# Patient Record
Sex: FEMALE | Race: WHITE | NOT HISPANIC OR LATINO | Employment: OTHER | URBAN - NONMETROPOLITAN AREA
[De-identification: names, ages, dates, MRNs, and addresses within clinical notes are randomized per-mention and may not be internally consistent; named-entity substitution may affect disease eponyms.]

---

## 2017-03-10 PROCEDURE — 88304 TISSUE EXAM BY PATHOLOGIST: CPT | Performed by: SURGERY

## 2017-03-14 ENCOUNTER — LAB REQUISITION (OUTPATIENT)
Dept: LAB | Facility: HOSPITAL | Age: 62
End: 2017-03-14

## 2017-03-14 DIAGNOSIS — Z00.00 ENCOUNTER FOR GENERAL ADULT MEDICAL EXAMINATION WITHOUT ABNORMAL FINDINGS: ICD-10-CM

## 2017-03-15 LAB
CYTO UR: NORMAL
LAB AP CASE REPORT: NORMAL
LAB AP CLINICAL INFORMATION: NORMAL
Lab: NORMAL
PATH REPORT.FINAL DX SPEC: NORMAL
PATH REPORT.GROSS SPEC: NORMAL

## 2020-06-23 PROCEDURE — 88305 TISSUE EXAM BY PATHOLOGIST: CPT | Performed by: GENERAL PRACTICE

## 2020-06-24 ENCOUNTER — LAB REQUISITION (OUTPATIENT)
Dept: LAB | Facility: HOSPITAL | Age: 65
End: 2020-06-24

## 2020-06-24 DIAGNOSIS — Z00.00 ENCOUNTER FOR GENERAL ADULT MEDICAL EXAMINATION WITHOUT ABNORMAL FINDINGS: ICD-10-CM

## 2020-06-25 LAB
CYTO UR: NORMAL
LAB AP CASE REPORT: NORMAL
LAB AP CLINICAL INFORMATION: NORMAL
PATH REPORT.FINAL DX SPEC: NORMAL
PATH REPORT.GROSS SPEC: NORMAL

## 2020-08-11 ENCOUNTER — LAB REQUISITION (OUTPATIENT)
Dept: LAB | Facility: HOSPITAL | Age: 65
End: 2020-08-11

## 2020-08-11 DIAGNOSIS — Z00.00 ENCOUNTER FOR GENERAL ADULT MEDICAL EXAMINATION WITHOUT ABNORMAL FINDINGS: ICD-10-CM

## 2020-08-11 PROCEDURE — 88305 TISSUE EXAM BY PATHOLOGIST: CPT | Performed by: GENERAL PRACTICE

## 2020-08-11 PROCEDURE — 88312 SPECIAL STAINS GROUP 1: CPT | Performed by: GENERAL PRACTICE

## 2020-08-13 LAB
LAB AP CASE REPORT: NORMAL
LAB AP CLINICAL INFORMATION: NORMAL
LAB AP DIAGNOSIS COMMENT: NORMAL
PATH REPORT.FINAL DX SPEC: NORMAL
PATH REPORT.GROSS SPEC: NORMAL

## 2021-04-15 PROCEDURE — 88305 TISSUE EXAM BY PATHOLOGIST: CPT | Performed by: GENERAL PRACTICE

## 2021-04-15 PROCEDURE — 88313 SPECIAL STAINS GROUP 2: CPT | Performed by: GENERAL PRACTICE

## 2021-04-18 ENCOUNTER — LAB REQUISITION (OUTPATIENT)
Dept: LAB | Facility: HOSPITAL | Age: 66
End: 2021-04-18

## 2021-04-18 DIAGNOSIS — Z00.00 ENCOUNTER FOR GENERAL ADULT MEDICAL EXAMINATION WITHOUT ABNORMAL FINDINGS: ICD-10-CM

## 2021-04-21 LAB
CYTO UR: NORMAL
LAB AP CASE REPORT: NORMAL
LAB AP CLINICAL INFORMATION: NORMAL
LAB AP DIAGNOSIS COMMENT: NORMAL
PATH REPORT.FINAL DX SPEC: NORMAL
PATH REPORT.GROSS SPEC: NORMAL

## 2021-06-08 ENCOUNTER — HOSPITAL ENCOUNTER (OUTPATIENT)
Dept: PREADMISSION TESTING | Age: 66
Discharge: HOME | End: 2021-06-12
Payer: MEDICARE

## 2021-06-08 VITALS — WEIGHT: 186 LBS

## 2021-06-08 LAB
ANION GAP SERPL CALCULATED.3IONS-SCNC: 11 MMOL/L (ref 7–19)
BASOPHILS ABSOLUTE: 0.1 K/UL (ref 0–0.2)
BASOPHILS RELATIVE PERCENT: 0.8 % (ref 0–1)
BUN BLDV-MCNC: 21 MG/DL (ref 8–23)
CALCIUM SERPL-MCNC: 9.4 MG/DL (ref 8.8–10.2)
CHLORIDE BLD-SCNC: 104 MMOL/L (ref 98–111)
CO2: 27 MMOL/L (ref 22–29)
CREAT SERPL-MCNC: 1.1 MG/DL (ref 0.5–0.9)
EKG P AXIS: 31 DEGREES
EKG P-R INTERVAL: 174 MS
EKG Q-T INTERVAL: 430 MS
EKG QRS DURATION: 90 MS
EKG QTC CALCULATION (BAZETT): 465 MS
EKG T AXIS: 66 DEGREES
EOSINOPHILS ABSOLUTE: 0.1 K/UL (ref 0–0.6)
EOSINOPHILS RELATIVE PERCENT: 1.5 % (ref 0–5)
GFR AFRICAN AMERICAN: >59
GFR NON-AFRICAN AMERICAN: 50
GLUCOSE BLD-MCNC: 122 MG/DL (ref 74–109)
HCT VFR BLD CALC: 36.9 % (ref 37–47)
HEMOGLOBIN: 12.1 G/DL (ref 12–16)
IMMATURE GRANULOCYTES #: 0 K/UL
LYMPHOCYTES ABSOLUTE: 2.5 K/UL (ref 1.1–4.5)
LYMPHOCYTES RELATIVE PERCENT: 30 % (ref 20–40)
MCH RBC QN AUTO: 31.2 PG (ref 27–31)
MCHC RBC AUTO-ENTMCNC: 32.8 G/DL (ref 33–37)
MCV RBC AUTO: 95.1 FL (ref 81–99)
MONOCYTES ABSOLUTE: 0.3 K/UL (ref 0–0.9)
MONOCYTES RELATIVE PERCENT: 3.9 % (ref 0–10)
NEUTROPHILS ABSOLUTE: 5.2 K/UL (ref 1.5–7.5)
NEUTROPHILS RELATIVE PERCENT: 63.4 % (ref 50–65)
PDW BLD-RTO: 12.8 % (ref 11.5–14.5)
PLATELET # BLD: 298 K/UL (ref 130–400)
PMV BLD AUTO: 9.9 FL (ref 9.4–12.3)
POTASSIUM SERPL-SCNC: 4.2 MMOL/L (ref 3.5–5)
RBC # BLD: 3.88 M/UL (ref 4.2–5.4)
SARS-COV-2, PCR: NOT DETECTED
SODIUM BLD-SCNC: 142 MMOL/L (ref 136–145)
WBC # BLD: 8.3 K/UL (ref 4.8–10.8)

## 2021-06-08 PROCEDURE — 93005 ELECTROCARDIOGRAM TRACING: CPT | Performed by: ORTHOPAEDIC SURGERY

## 2021-06-08 PROCEDURE — U0003 INFECTIOUS AGENT DETECTION BY NUCLEIC ACID (DNA OR RNA); SEVERE ACUTE RESPIRATORY SYNDROME CORONAVIRUS 2 (SARS-COV-2) (CORONAVIRUS DISEASE [COVID-19]), AMPLIFIED PROBE TECHNIQUE, MAKING USE OF HIGH THROUGHPUT TECHNOLOGIES AS DESCRIBED BY CMS-2020-01-R: HCPCS

## 2021-06-08 PROCEDURE — 80048 BASIC METABOLIC PNL TOTAL CA: CPT

## 2021-06-08 PROCEDURE — 85025 COMPLETE CBC W/AUTO DIFF WBC: CPT

## 2021-06-08 PROCEDURE — U0005 INFEC AGEN DETEC AMPLI PROBE: HCPCS

## 2021-06-08 PROCEDURE — 93010 ELECTROCARDIOGRAM REPORT: CPT | Performed by: INTERNAL MEDICINE

## 2021-06-08 RX ORDER — LEVOTHYROXINE SODIUM 0.03 MG/1
25 TABLET ORAL DAILY
COMMUNITY

## 2021-06-08 RX ORDER — SUCRALFATE 1 G/1
1 TABLET ORAL 4 TIMES DAILY
COMMUNITY
End: 2022-01-20

## 2021-06-08 RX ORDER — PANTOPRAZOLE SODIUM 40 MG/1
40 TABLET, DELAYED RELEASE ORAL 2 TIMES DAILY
COMMUNITY
End: 2022-01-20

## 2021-06-08 RX ORDER — LISINOPRIL 20 MG/1
20 TABLET ORAL DAILY
COMMUNITY
End: 2022-01-20

## 2021-06-08 RX ORDER — ASPIRIN 81 MG/1
81 TABLET ORAL DAILY
COMMUNITY
End: 2022-01-20

## 2021-06-09 PROBLEM — S83.242D: Status: ACTIVE | Noted: 2021-06-09

## 2021-06-09 NOTE — PROGRESS NOTES
Cardiac read EKG don in PAT on 6/8/21 reviewed by anesthesia. OK to proceed with surgery per Dr. Jane Diego.

## 2021-06-09 NOTE — OP NOTE
Patient Name: Thomasina Angelucci: 1955  MRN: Flores Galvan of SURGERY: 6/10/2021    SURGEON: [address] [place] [address] Dion Rodriguez MD    ASSISTANT: NONE    PREOPERATIVE DIAGNOSIS:   1) Acute traumatic complex tear of the posterior horn of the medial meniscus, Left knee  2) Chondromalacia patella, left knee    POSTOPERATIVE DIAGNOSIS:   1) Acute traumatic complex tear of the posterior horn of the medial meniscus, Left knee  2) Acute traumatic complex tear of the anterior horn of the lateral meniscus, Left knee  3) Chondromalacia patella, left knee    PROCEDURE PERFORMED:  1)  Left knee arthroscopic partial medial AND lateral meniscectomies  2)  Left knee arthroscopic patellar chondroplasty    IMPLANTS: none    ANESTHESIA USED: LMA    OPERATIVE INDICATIONS: This patient is a 60 y.o. female presented to my clinic with complaints of progressive knee pain and mechanical symptoms after a traumatic injury to the knee that occurred on 5/2/21 after falling over some loose boards on a porch while visiting relatives in [place]. An MRI was obtained which showed the above named diagnoses. Pain and mechanical symptoms were not relieved with conservative treatment consisting of anti-inflammatories, corticosteroid injections, physical therapy. Due to progressive pain and loss of function, surgical evaluation was discussed and the patient wished to proceed understanding risks, benefits, and alternatives. Surgical indications were to relieve pain and mechanical symptoms related to an unstable meniscus tear. Risks included, but were not limited to, that of anesthesia, bleeding, infection, pain, damage to local structures, need for further surgery, failure to improve, stiffness, failure of implants, and loss of function.       ESTIMATED BLOOD LOSS: minimal    DRAINS: none     COMPLICATIONS: none    SPECIMENS: none    OPERATIVE FINDINGS:  1) Exam Under Anesthesia:  ROM 0-130, stable ligamentously without stable. Attention was then turned to the patellofemoral compartment where a probe was used to demonstrate unstable chondral flaps. A shaver and cautery device were used to debride the unstable portions of the cartilage. The probe was reinserted verifying the remaining cartilage to be stable without further injury. Free fluid was suctioned from the knee, portals were closed with monocryl suture and steri-strips. A sterile dressing was placed. The patient was awakened from anesthesia, transported to the PACU in stable condition.     POSTOPERATIVE PLAN:  1) Discharge home with family  2) Return to clinic 1 week for a clinical check     Electronically signed by Garland Buckley MD on 6/10/2021 at 10:57 AM

## 2021-06-10 ENCOUNTER — ANESTHESIA EVENT (OUTPATIENT)
Dept: OPERATING ROOM | Age: 66
End: 2021-06-10
Payer: MEDICARE

## 2021-06-10 ENCOUNTER — ANESTHESIA (OUTPATIENT)
Dept: OPERATING ROOM | Age: 66
End: 2021-06-10
Payer: MEDICARE

## 2021-06-10 ENCOUNTER — HOSPITAL ENCOUNTER (OUTPATIENT)
Age: 66
Setting detail: OUTPATIENT SURGERY
Discharge: HOME | End: 2021-06-10
Attending: ORTHOPAEDIC SURGERY | Admitting: ORTHOPAEDIC SURGERY
Payer: MEDICARE

## 2021-06-10 VITALS
RESPIRATION RATE: 16 BRPM | HEIGHT: 62 IN | TEMPERATURE: 97.1 F | DIASTOLIC BLOOD PRESSURE: 79 MMHG | WEIGHT: 186 LBS | OXYGEN SATURATION: 95 % | SYSTOLIC BLOOD PRESSURE: 143 MMHG | HEART RATE: 78 BPM | BODY MASS INDEX: 34.23 KG/M2

## 2021-06-10 VITALS — OXYGEN SATURATION: 98 % | SYSTOLIC BLOOD PRESSURE: 105 MMHG | DIASTOLIC BLOOD PRESSURE: 64 MMHG | TEMPERATURE: 95.7 F

## 2021-06-10 DIAGNOSIS — S83.242D TRAUMATIC TEAR OF MEDIAL MENISCUS OF KNEE, LEFT, SUBSEQUENT ENCOUNTER: Primary | ICD-10-CM

## 2021-06-10 PROCEDURE — 7100000011 HC PHASE II RECOVERY - ADDTL 15 MIN: Performed by: ORTHOPAEDIC SURGERY

## 2021-06-10 PROCEDURE — 7100000010 HC PHASE II RECOVERY - FIRST 15 MIN: Performed by: ORTHOPAEDIC SURGERY

## 2021-06-10 PROCEDURE — 2500000003 HC RX 250 WO HCPCS: Performed by: ORTHOPAEDIC SURGERY

## 2021-06-10 PROCEDURE — 2709999900 HC NON-CHARGEABLE SUPPLY: Performed by: ORTHOPAEDIC SURGERY

## 2021-06-10 PROCEDURE — 3700000000 HC ANESTHESIA ATTENDED CARE: Performed by: ORTHOPAEDIC SURGERY

## 2021-06-10 PROCEDURE — 2580000003 HC RX 258: Performed by: NURSE ANESTHETIST, CERTIFIED REGISTERED

## 2021-06-10 PROCEDURE — 6370000000 HC RX 637 (ALT 250 FOR IP): Performed by: ORTHOPAEDIC SURGERY

## 2021-06-10 PROCEDURE — 7100000001 HC PACU RECOVERY - ADDTL 15 MIN: Performed by: ORTHOPAEDIC SURGERY

## 2021-06-10 PROCEDURE — 6360000002 HC RX W HCPCS: Performed by: NURSE ANESTHETIST, CERTIFIED REGISTERED

## 2021-06-10 PROCEDURE — 3600000004 HC SURGERY LEVEL 4 BASE: Performed by: ORTHOPAEDIC SURGERY

## 2021-06-10 PROCEDURE — 2500000003 HC RX 250 WO HCPCS: Performed by: NURSE ANESTHETIST, CERTIFIED REGISTERED

## 2021-06-10 PROCEDURE — 7100000000 HC PACU RECOVERY - FIRST 15 MIN: Performed by: ORTHOPAEDIC SURGERY

## 2021-06-10 PROCEDURE — 2720000010 HC SURG SUPPLY STERILE: Performed by: ORTHOPAEDIC SURGERY

## 2021-06-10 PROCEDURE — 3600000014 HC SURGERY LEVEL 4 ADDTL 15MIN: Performed by: ORTHOPAEDIC SURGERY

## 2021-06-10 PROCEDURE — 6360000002 HC RX W HCPCS: Performed by: ORTHOPAEDIC SURGERY

## 2021-06-10 PROCEDURE — 3700000001 HC ADD 15 MINUTES (ANESTHESIA): Performed by: ORTHOPAEDIC SURGERY

## 2021-06-10 RX ORDER — LABETALOL HYDROCHLORIDE 5 MG/ML
5 INJECTION, SOLUTION INTRAVENOUS EVERY 10 MIN PRN
Status: DISCONTINUED | OUTPATIENT
Start: 2021-06-10 | End: 2021-06-10 | Stop reason: HOSPADM

## 2021-06-10 RX ORDER — HYDROCODONE BITARTRATE AND ACETAMINOPHEN 5; 325 MG/1; MG/1
1 TABLET ORAL EVERY 4 HOURS PRN
Qty: 15 TABLET | Refills: 0 | Status: SHIPPED | OUTPATIENT
Start: 2021-06-10 | End: 2021-06-17

## 2021-06-10 RX ORDER — PROPOFOL 10 MG/ML
INJECTION, EMULSION INTRAVENOUS PRN
Status: DISCONTINUED | OUTPATIENT
Start: 2021-06-10 | End: 2021-06-10 | Stop reason: SDUPTHER

## 2021-06-10 RX ORDER — FENTANYL CITRATE 50 UG/ML
INJECTION, SOLUTION INTRAMUSCULAR; INTRAVENOUS PRN
Status: DISCONTINUED | OUTPATIENT
Start: 2021-06-10 | End: 2021-06-10 | Stop reason: SDUPTHER

## 2021-06-10 RX ORDER — METOCLOPRAMIDE HYDROCHLORIDE 5 MG/ML
10 INJECTION INTRAMUSCULAR; INTRAVENOUS
Status: DISCONTINUED | OUTPATIENT
Start: 2021-06-10 | End: 2021-06-10 | Stop reason: HOSPADM

## 2021-06-10 RX ORDER — NAPROXEN 500 MG/1
500 TABLET ORAL DAILY
COMMUNITY
End: 2022-01-20

## 2021-06-10 RX ORDER — DIPHENHYDRAMINE HYDROCHLORIDE 50 MG/ML
12.5 INJECTION INTRAMUSCULAR; INTRAVENOUS
Status: DISCONTINUED | OUTPATIENT
Start: 2021-06-10 | End: 2021-06-10 | Stop reason: HOSPADM

## 2021-06-10 RX ORDER — HYDROMORPHONE HYDROCHLORIDE 1 MG/ML
0.5 INJECTION, SOLUTION INTRAMUSCULAR; INTRAVENOUS; SUBCUTANEOUS EVERY 5 MIN PRN
Status: DISCONTINUED | OUTPATIENT
Start: 2021-06-10 | End: 2021-06-10 | Stop reason: HOSPADM

## 2021-06-10 RX ORDER — ENALAPRILAT 2.5 MG/2ML
1.25 INJECTION INTRAVENOUS
Status: DISCONTINUED | OUTPATIENT
Start: 2021-06-10 | End: 2021-06-10 | Stop reason: HOSPADM

## 2021-06-10 RX ORDER — ONDANSETRON 2 MG/ML
INJECTION INTRAMUSCULAR; INTRAVENOUS PRN
Status: DISCONTINUED | OUTPATIENT
Start: 2021-06-10 | End: 2021-06-10 | Stop reason: SDUPTHER

## 2021-06-10 RX ORDER — ONDANSETRON 4 MG/1
4 TABLET, FILM COATED ORAL EVERY 8 HOURS PRN
Qty: 10 TABLET | Refills: 0 | Status: SHIPPED | OUTPATIENT
Start: 2021-06-10 | End: 2022-01-20

## 2021-06-10 RX ORDER — MORPHINE SULFATE 4 MG/ML
2 INJECTION, SOLUTION INTRAMUSCULAR; INTRAVENOUS EVERY 5 MIN PRN
Status: DISCONTINUED | OUTPATIENT
Start: 2021-06-10 | End: 2021-06-10 | Stop reason: HOSPADM

## 2021-06-10 RX ORDER — OXYCODONE HYDROCHLORIDE AND ACETAMINOPHEN 5; 325 MG/1; MG/1
2 TABLET ORAL PRN
Status: COMPLETED | OUTPATIENT
Start: 2021-06-10 | End: 2021-06-10

## 2021-06-10 RX ORDER — MORPHINE SULFATE 4 MG/ML
4 INJECTION, SOLUTION INTRAMUSCULAR; INTRAVENOUS EVERY 5 MIN PRN
Status: DISCONTINUED | OUTPATIENT
Start: 2021-06-10 | End: 2021-06-10 | Stop reason: HOSPADM

## 2021-06-10 RX ORDER — DEXAMETHASONE SODIUM PHOSPHATE 10 MG/ML
INJECTION, SOLUTION INTRAMUSCULAR; INTRAVENOUS PRN
Status: DISCONTINUED | OUTPATIENT
Start: 2021-06-10 | End: 2021-06-10 | Stop reason: SDUPTHER

## 2021-06-10 RX ORDER — OXYCODONE HYDROCHLORIDE AND ACETAMINOPHEN 5; 325 MG/1; MG/1
1 TABLET ORAL PRN
Status: COMPLETED | OUTPATIENT
Start: 2021-06-10 | End: 2021-06-10

## 2021-06-10 RX ORDER — SODIUM CHLORIDE, SODIUM LACTATE, POTASSIUM CHLORIDE, CALCIUM CHLORIDE 600; 310; 30; 20 MG/100ML; MG/100ML; MG/100ML; MG/100ML
INJECTION, SOLUTION INTRAVENOUS CONTINUOUS PRN
Status: DISCONTINUED | OUTPATIENT
Start: 2021-06-10 | End: 2021-06-10 | Stop reason: SDUPTHER

## 2021-06-10 RX ORDER — SODIUM CHLORIDE, SODIUM LACTATE, POTASSIUM CHLORIDE, CALCIUM CHLORIDE 600; 310; 30; 20 MG/100ML; MG/100ML; MG/100ML; MG/100ML
INJECTION, SOLUTION INTRAVENOUS CONTINUOUS
Status: DISCONTINUED | OUTPATIENT
Start: 2021-06-10 | End: 2021-06-10 | Stop reason: HOSPADM

## 2021-06-10 RX ORDER — LIDOCAINE HYDROCHLORIDE 10 MG/ML
INJECTION, SOLUTION INFILTRATION; PERINEURAL PRN
Status: DISCONTINUED | OUTPATIENT
Start: 2021-06-10 | End: 2021-06-10 | Stop reason: SDUPTHER

## 2021-06-10 RX ORDER — HYDROMORPHONE HYDROCHLORIDE 1 MG/ML
0.25 INJECTION, SOLUTION INTRAMUSCULAR; INTRAVENOUS; SUBCUTANEOUS EVERY 5 MIN PRN
Status: DISCONTINUED | OUTPATIENT
Start: 2021-06-10 | End: 2021-06-10 | Stop reason: HOSPADM

## 2021-06-10 RX ORDER — MEPERIDINE HYDROCHLORIDE 50 MG/ML
12.5 INJECTION INTRAMUSCULAR; INTRAVENOUS; SUBCUTANEOUS EVERY 5 MIN PRN
Status: DISCONTINUED | OUTPATIENT
Start: 2021-06-10 | End: 2021-06-10 | Stop reason: HOSPADM

## 2021-06-10 RX ORDER — PROMETHAZINE HYDROCHLORIDE 25 MG/ML
6.25 INJECTION, SOLUTION INTRAMUSCULAR; INTRAVENOUS
Status: DISCONTINUED | OUTPATIENT
Start: 2021-06-10 | End: 2021-06-10 | Stop reason: HOSPADM

## 2021-06-10 RX ORDER — HYDRALAZINE HYDROCHLORIDE 20 MG/ML
5 INJECTION INTRAMUSCULAR; INTRAVENOUS EVERY 10 MIN PRN
Status: DISCONTINUED | OUTPATIENT
Start: 2021-06-10 | End: 2021-06-10 | Stop reason: HOSPADM

## 2021-06-10 RX ADMIN — LIDOCAINE HYDROCHLORIDE 50 MG: 10 INJECTION, SOLUTION INFILTRATION; PERINEURAL at 10:16

## 2021-06-10 RX ADMIN — ONDANSETRON HYDROCHLORIDE 4 MG: 2 INJECTION, SOLUTION INTRAMUSCULAR; INTRAVENOUS at 10:45

## 2021-06-10 RX ADMIN — DEXAMETHASONE SODIUM PHOSPHATE 10 MG: 10 INJECTION, SOLUTION INTRAMUSCULAR; INTRAVENOUS at 10:20

## 2021-06-10 RX ADMIN — PROPOFOL 150 MG: 10 INJECTION, EMULSION INTRAVENOUS at 10:16

## 2021-06-10 RX ADMIN — FENTANYL CITRATE 50 MCG: 50 INJECTION, SOLUTION INTRAMUSCULAR; INTRAVENOUS at 10:35

## 2021-06-10 RX ADMIN — HYDROMORPHONE HYDROCHLORIDE 0.5 MG: 1 INJECTION, SOLUTION INTRAMUSCULAR; INTRAVENOUS; SUBCUTANEOUS at 11:19

## 2021-06-10 RX ADMIN — FENTANYL CITRATE 50 MCG: 50 INJECTION, SOLUTION INTRAMUSCULAR; INTRAVENOUS at 10:16

## 2021-06-10 RX ADMIN — SODIUM CHLORIDE, POTASSIUM CHLORIDE, SODIUM LACTATE AND CALCIUM CHLORIDE: 600; 310; 30; 20 INJECTION, SOLUTION INTRAVENOUS at 10:12

## 2021-06-10 RX ADMIN — OXYCODONE HYDROCHLORIDE AND ACETAMINOPHEN 2 TABLET: 5; 325 TABLET ORAL at 12:12

## 2021-06-10 RX ADMIN — HYDROMORPHONE HYDROCHLORIDE 0.5 MG: 1 INJECTION, SOLUTION INTRAMUSCULAR; INTRAVENOUS; SUBCUTANEOUS at 11:33

## 2021-06-10 RX ADMIN — Medication 2000 MG: at 10:23

## 2021-06-10 RX ADMIN — HYDROMORPHONE HYDROCHLORIDE 0.5 MG: 1 INJECTION, SOLUTION INTRAMUSCULAR; INTRAVENOUS; SUBCUTANEOUS at 11:04

## 2021-06-10 ASSESSMENT — PAIN SCALES - GENERAL
PAINLEVEL_OUTOF10: 7
PAINLEVEL_OUTOF10: 3
PAINLEVEL_OUTOF10: 5

## 2021-06-10 ASSESSMENT — PAIN DESCRIPTION - PAIN TYPE
TYPE: SURGICAL PAIN

## 2021-06-10 ASSESSMENT — PAIN DESCRIPTION - DESCRIPTORS
DESCRIPTORS: ACHING

## 2021-06-10 ASSESSMENT — PAIN DESCRIPTION - ORIENTATION
ORIENTATION: LEFT
ORIENTATION: LEFT

## 2021-06-10 ASSESSMENT — LIFESTYLE VARIABLES: SMOKING_STATUS: 0

## 2021-06-10 ASSESSMENT — PAIN DESCRIPTION - LOCATION
LOCATION: INCISION;KNEE
LOCATION: KNEE

## 2021-06-10 NOTE — BRIEF OP NOTE
Brief Postoperative Note      Patient: Mabel Conn  YOB: 1955  MRN: [id number]    Date of Procedure: 6/10/2021    Pre-Op Diagnosis: S83.282A, M22.42    Post-Op Diagnosis: Same       Procedure(s):  LEFT KNEE PARTIAL MEDIAL MENISCECTOMY, PATELLAR CHONDROPLASTY    Surgeon(s):  Devorah Mcgee MD    Assistant:  * No surgical staff found *    Anesthesia: General    Estimated Blood Loss (mL): Minimal    Complications: None    Specimens:   * No specimens in log *    Implants:  * No implants in log *      Drains: * No LDAs found *    Findings: see op note    Electronically signed by Devorah Mcgee MD on 6/10/2021 at 10:54 AM

## 2021-06-10 NOTE — ANESTHESIA PRE PROCEDURE
Department of Anesthesiology  Preprocedure Note       Name:  Beverly Zarate   Age:  60 y.o.  :  1955                                          MRN:  [id number]         Date:  6/10/2021      Surgeon: Esvin Castro):  Rick Benites MD    Procedure: Procedure(s):  LEFT KNEE PARTIAL MEDIAL AND LATERAL MENISCECTOMY, PATELLAR CHONDROPLASTY    Medications prior to admission:   Prior to Admission medications    Medication Sig Start Date End Date Taking?  Authorizing Provider   lisinopril (PRINIVIL;ZESTRIL) 20 MG tablet Take 20 mg by mouth daily    Historical Provider, MD   levothyroxine (SYNTHROID) 25 MCG tablet Take 25 mcg by mouth Daily    Historical Provider, MD   Citalopram Hydrobromide (CELEXA PO) Take 30 mg by mouth daily    Historical Provider, MD   aspirin 81 MG EC tablet Take 81 mg by mouth daily    Historical Provider, MD   pantoprazole (PROTONIX) 40 MG tablet Take 40 mg by mouth 2 times daily    Historical Provider, MD   sucralfate (CARAFATE) 1 GM tablet Take 1 g by mouth 4 times daily    Historical Provider, MD   HYDROcodone-acetaminophen (NORCO)  MG per tablet Take 1 tablet by mouth nightly    Historical Provider, MD   cyclobenzaprine (FLEXERIL) 10 MG tablet Take 10 mg by mouth nightly    Historical Provider, MD   GABAPENTIN PO Take by mouth nightly    Historical Provider, MD   ibuprofen (ADVIL;MOTRIN) 600 MG tablet Take 1 tablet by mouth every 6 hours as needed for Pain 16   Minus PlanSABRINA lopez       Current medications:    Current Facility-Administered Medications   Medication Dose Route Frequency Provider Last Rate Last Admin    ceFAZolin (ANCEF) 2,000 mg in sterile water 20 mL IV syringe  2,000 mg Intravenous Once Rick Benites MD        lactated ringers infusion   Intravenous Continuous Dexter Adjutant, MD           Allergies:  No Known Allergies    Problem List:    Patient Active Problem List   Diagnosis Code    Traumatic tear of medial meniscus of knee, left, subsequent encounter Y90.727Q       Past Medical History:        Diagnosis Date    Welch esophagus     Chronic back pain     Hypertension     Thyroid disease        Past Surgical History:        Procedure Laterality Date    APPENDECTOMY      CHOLECYSTECTOMY      FRACTURE SURGERY      multiple fractures in multiple locations    HYSTERECTOMY      JOINT REPLACEMENT Right     knee       Social History:    Social History     Tobacco Use    Smoking status: Never Smoker    Smokeless tobacco: Never Used   Substance Use Topics    Alcohol use: No                                Counseling given: Not Answered      Vital Signs (Current):   Vitals:    06/10/21 0848   BP: 136/82   Pulse: 70   Resp: 18   Temp: 97.3 °F (36.3 °C)   TempSrc: Temporal   SpO2: 100%   Weight: 186 lb (84.4 kg)   Height: 5' 2\" (1.575 m)                                              BP Readings from Last 3 Encounters:   06/10/21 136/82       NPO Status:                                                                                 BMI:   Wt Readings from Last 3 Encounters:   06/10/21 186 lb (84.4 kg)   06/08/21 186 lb (84.4 kg)     Body mass index is 34.02 kg/m². CBC:   Lab Results   Component Value Date    WBC 8.3 06/08/2021    RBC 3.88 06/08/2021    HGB 12.1 06/08/2021    HCT 36.9 06/08/2021    MCV 95.1 06/08/2021    RDW 12.8 06/08/2021     06/08/2021       CMP:   Lab Results   Component Value Date     06/08/2021    K 4.2 06/08/2021     06/08/2021    CO2 27 06/08/2021    BUN 21 06/08/2021    CREATININE 1.1 06/08/2021    GFRAA >59 06/08/2021    LABGLOM 50 06/08/2021    GLUCOSE 122 06/08/2021    PROT 7.0 03/10/2015    PROT 6.6 10/25/2012    CALCIUM 9.4 06/08/2021    ALKPHOS 94 03/10/2015    AST 43 03/10/2015    ALT 54 03/10/2015       POC Tests: No results for input(s): POCGLU, POCNA, POCK, POCCL, POCBUN, POCHEMO, POCHCT in the last 72 hours.     Coags:   Lab Results   Component Value Date    PROTIME 12.1 03/10/2015    INR 0.92 03/10/2015 HCG (If Applicable): No results found for: PREGTESTUR, PREGSERUM, HCG, HCGQUANT     ABGs: No results found for: PHART, PO2ART, RDX5QIT, FGL6UKH, BEART, V8VFACJV     Type & Screen (If Applicable):  No results found for: LABABO, LABRH    Drug/Infectious Status (If Applicable):  No results found for: HIV, HEPCAB    COVID-19 Screening (If Applicable):   Lab Results   Component Value Date    COVID19 Not Detected 06/08/2021           Anesthesia Evaluation  Patient summary reviewed and Nursing notes reviewed no history of anesthetic complications:   Airway: Mallampati: I  TM distance: >3 FB   Neck ROM: full  Mouth opening: > = 3 FB Dental:          Pulmonary:normal exam        (-) not a current smoker                           Cardiovascular:    (+) hypertension:,          Beta Blocker:  Not on Beta Blocker         Neuro/Psych:   Negative Neuro/Psych ROS  (+) depression/anxiety    (-) seizures and CVA           GI/Hepatic/Renal:   (+) GERD: well controlled,           Endo/Other:    (+) hypothyroidism::., .    (-) diabetes mellitus               Abdominal:           Vascular: negative vascular ROS.  + DVT, PE. Anesthesia Plan      general     ASA 3       Induction: intravenous. MIPS: Postoperative opioids intended and Prophylactic antiemetics administered. Anesthetic plan and risks discussed with patient. Plan discussed with CRNA.                   Killian Mesa MD   6/10/2021

## 2021-06-10 NOTE — PROGRESS NOTES
CLINICAL PHARMACY NOTE: MEDS TO BEDS    Total # of Prescriptions Filled: 2   The following medications were delivered to the patient:  · Norco 5/325 mg  · Ondansetron 4 mg    Additional Documentation:    Handed scripts to patients spouse, patient was alert as well in [address] [place] [address].

## 2022-01-19 ENCOUNTER — TRANSCRIBE ORDERS (OUTPATIENT)
Dept: ADMINISTRATIVE | Facility: HOSPITAL | Age: 67
End: 2022-01-19

## 2022-01-19 ENCOUNTER — HOSPITAL ENCOUNTER (OUTPATIENT)
Dept: ULTRASOUND IMAGING | Facility: HOSPITAL | Age: 67
Discharge: HOME | End: 2022-01-19
Admitting: ORTHOPAEDIC SURGERY

## 2022-01-19 DIAGNOSIS — M79.605 PAIN IN LEFT LEG: ICD-10-CM

## 2022-01-19 DIAGNOSIS — M79.604 PAIN IN RIGHT LEG: Primary | ICD-10-CM

## 2022-01-19 PROCEDURE — 93970 EXTREMITY STUDY: CPT

## 2022-01-20 ENCOUNTER — HOSPITAL ENCOUNTER (OUTPATIENT)
Dept: PREADMISSION TESTING | Age: 67
Discharge: HOME | End: 2022-01-24
Payer: MEDICARE

## 2022-01-20 ENCOUNTER — HOSPITAL ENCOUNTER (OUTPATIENT)
Dept: GENERAL RADIOLOGY | Age: 67
Discharge: HOME | End: 2022-01-20
Payer: MEDICARE

## 2022-01-20 VITALS — WEIGHT: 190 LBS | BODY MASS INDEX: 34.96 KG/M2 | HEIGHT: 62 IN

## 2022-01-20 LAB
ABO/RH: NORMAL
ALBUMIN SERPL-MCNC: 4.4 G/DL (ref 3.5–5.2)
ALP BLD-CCNC: 100 U/L (ref 35–104)
ALT SERPL-CCNC: 12 U/L (ref 5–33)
ANION GAP SERPL CALCULATED.3IONS-SCNC: 17 MMOL/L (ref 7–19)
ANTIBODY SCREEN: NORMAL
APTT: 29.8 SEC (ref 26–36.2)
AST SERPL-CCNC: 14 U/L (ref 5–32)
BACTERIA: NEGATIVE /HPF
BASOPHILS ABSOLUTE: 0.1 K/UL (ref 0–0.2)
BASOPHILS RELATIVE PERCENT: 1.1 % (ref 0–1)
BILIRUB SERPL-MCNC: 0.3 MG/DL (ref 0.2–1.2)
BILIRUBIN URINE: NEGATIVE
BLOOD, URINE: NEGATIVE
BUN BLDV-MCNC: 30 MG/DL (ref 8–23)
CALCIUM SERPL-MCNC: 9.7 MG/DL (ref 8.8–10.2)
CHLORIDE BLD-SCNC: 100 MMOL/L (ref 98–111)
CLARITY: ABNORMAL
CO2: 24 MMOL/L (ref 22–29)
COLOR: YELLOW
CREAT SERPL-MCNC: 2 MG/DL (ref 0.5–0.9)
CRYSTALS, UA: ABNORMAL /HPF
EOSINOPHILS ABSOLUTE: 0.2 K/UL (ref 0–0.6)
EOSINOPHILS RELATIVE PERCENT: 2.1 % (ref 0–5)
EPITHELIAL CELLS, UA: 4 /HPF (ref 0–5)
GFR AFRICAN AMERICAN: 30
GFR NON-AFRICAN AMERICAN: 25
GLUCOSE BLD-MCNC: 83 MG/DL (ref 74–109)
GLUCOSE URINE: NEGATIVE MG/DL
HCT VFR BLD CALC: 40 % (ref 37–47)
HEMOGLOBIN: 12.2 G/DL (ref 12–16)
HYALINE CASTS: 5 /HPF (ref 0–8)
IMMATURE GRANULOCYTES #: 0 K/UL
INR BLD: 1 (ref 0.88–1.18)
KETONES, URINE: NEGATIVE MG/DL
LEUKOCYTE ESTERASE, URINE: ABNORMAL
LYMPHOCYTES ABSOLUTE: 3.1 K/UL (ref 1.1–4.5)
LYMPHOCYTES RELATIVE PERCENT: 36.6 % (ref 20–40)
MCH RBC QN AUTO: 30.2 PG (ref 27–31)
MCHC RBC AUTO-ENTMCNC: 30.5 G/DL (ref 33–37)
MCV RBC AUTO: 99 FL (ref 81–99)
MONOCYTES ABSOLUTE: 0.4 K/UL (ref 0–0.9)
MONOCYTES RELATIVE PERCENT: 4.8 % (ref 0–10)
MRSA SCREEN RT-PCR: NOT DETECTED
NEUTROPHILS ABSOLUTE: 4.7 K/UL (ref 1.5–7.5)
NEUTROPHILS RELATIVE PERCENT: 55 % (ref 50–65)
NITRITE, URINE: NEGATIVE
PDW BLD-RTO: 12.7 % (ref 11.5–14.5)
PH UA: 5 (ref 5–8)
PLATELET # BLD: 376 K/UL (ref 130–400)
PMV BLD AUTO: 10.2 FL (ref 9.4–12.3)
POTASSIUM SERPL-SCNC: 4 MMOL/L (ref 3.5–5)
PROTEIN UA: NEGATIVE MG/DL
PROTHROMBIN TIME: 13.4 SEC (ref 12–14.6)
RBC # BLD: 4.04 M/UL (ref 4.2–5.4)
RBC UA: 2 /HPF (ref 0–4)
SODIUM BLD-SCNC: 141 MMOL/L (ref 136–145)
SPECIFIC GRAVITY UA: 1.02 (ref 1–1.03)
TOTAL PROTEIN: 7.1 G/DL (ref 6.6–8.7)
UROBILINOGEN, URINE: 0.2 E.U./DL
WBC # BLD: 8.5 K/UL (ref 4.8–10.8)
WBC UA: 38 /HPF (ref 0–5)

## 2022-01-20 PROCEDURE — 85730 THROMBOPLASTIN TIME PARTIAL: CPT

## 2022-01-20 PROCEDURE — 81001 URINALYSIS AUTO W/SCOPE: CPT

## 2022-01-20 PROCEDURE — 71046 X-RAY EXAM CHEST 2 VIEWS: CPT

## 2022-01-20 PROCEDURE — 80053 COMPREHEN METABOLIC PANEL: CPT

## 2022-01-20 PROCEDURE — 86900 BLOOD TYPING SEROLOGIC ABO: CPT

## 2022-01-20 PROCEDURE — 87641 MR-STAPH DNA AMP PROBE: CPT

## 2022-01-20 PROCEDURE — 93005 ELECTROCARDIOGRAM TRACING: CPT | Performed by: ORTHOPAEDIC SURGERY

## 2022-01-20 PROCEDURE — 86850 RBC ANTIBODY SCREEN: CPT

## 2022-01-20 PROCEDURE — 87086 URINE CULTURE/COLONY COUNT: CPT

## 2022-01-20 PROCEDURE — 85025 COMPLETE CBC W/AUTO DIFF WBC: CPT

## 2022-01-20 PROCEDURE — 85610 PROTHROMBIN TIME: CPT

## 2022-01-20 PROCEDURE — 86901 BLOOD TYPING SEROLOGIC RH(D): CPT

## 2022-01-20 RX ORDER — MELOXICAM 15 MG/1
15 TABLET ORAL DAILY
Status: ON HOLD | COMMUNITY
End: 2022-03-02 | Stop reason: HOSPADM

## 2022-01-20 RX ORDER — ACETAMINOPHEN 500 MG
1000 TABLET ORAL ONCE
Status: CANCELLED | OUTPATIENT
Start: 2022-02-02

## 2022-01-20 RX ORDER — OXYCODONE HCL 10 MG/1
10 TABLET, FILM COATED, EXTENDED RELEASE ORAL ONCE
Status: CANCELLED | OUTPATIENT
Start: 2022-02-02

## 2022-01-20 RX ORDER — DEXAMETHASONE SODIUM PHOSPHATE 10 MG/ML
10 INJECTION, SOLUTION INTRAMUSCULAR; INTRAVENOUS ONCE
Status: CANCELLED | OUTPATIENT
Start: 2022-02-02

## 2022-01-20 RX ORDER — CELECOXIB 200 MG/1
200 CAPSULE ORAL ONCE
Status: CANCELLED | OUTPATIENT
Start: 2022-02-02

## 2022-01-20 RX ORDER — LISINOPRIL AND HYDROCHLOROTHIAZIDE 20; 12.5 MG/1; MG/1
1 TABLET ORAL DAILY
COMMUNITY

## 2022-01-20 RX ORDER — BUPROPION HYDROCHLORIDE 300 MG/1
300 TABLET ORAL EVERY MORNING
COMMUNITY

## 2022-01-20 RX ORDER — PREGABALIN 75 MG/1
75 CAPSULE ORAL ONCE
Status: CANCELLED | OUTPATIENT
Start: 2022-02-02

## 2022-01-21 LAB
EKG P AXIS: 38 DEGREES
EKG P-R INTERVAL: 164 MS
EKG Q-T INTERVAL: 358 MS
EKG QRS DURATION: 92 MS
EKG QTC CALCULATION (BAZETT): 417 MS
EKG T AXIS: 35 DEGREES

## 2022-01-21 PROCEDURE — 93010 ELECTROCARDIOGRAM REPORT: CPT | Performed by: INTERNAL MEDICINE

## 2022-01-22 LAB
ORGANISM: ABNORMAL
URINE CULTURE, ROUTINE: ABNORMAL
URINE CULTURE, ROUTINE: ABNORMAL

## 2022-01-31 ENCOUNTER — HOSPITAL ENCOUNTER (OUTPATIENT)
Dept: PREADMISSION TESTING | Age: 67
Discharge: HOME | End: 2022-02-04
Payer: MEDICARE

## 2022-01-31 LAB — SARS-COV-2, PCR: DETECTED

## 2022-01-31 PROCEDURE — U0005 INFEC AGEN DETEC AMPLI PROBE: HCPCS

## 2022-01-31 PROCEDURE — U0003 INFECTIOUS AGENT DETECTION BY NUCLEIC ACID (DNA OR RNA); SEVERE ACUTE RESPIRATORY SYNDROME CORONAVIRUS 2 (SARS-COV-2) (CORONAVIRUS DISEASE [COVID-19]), AMPLIFIED PROBE TECHNIQUE, MAKING USE OF HIGH THROUGHPUT TECHNOLOGIES AS DESCRIBED BY CMS-2020-01-R: HCPCS

## 2022-03-02 ENCOUNTER — HOSPITAL ENCOUNTER (OUTPATIENT)
Age: 67
Setting detail: OUTPATIENT SURGERY
Discharge: HOME | End: 2022-03-02
Attending: ORTHOPAEDIC SURGERY | Admitting: ORTHOPAEDIC SURGERY
Payer: MEDICARE

## 2022-03-02 ENCOUNTER — ANESTHESIA EVENT (OUTPATIENT)
Dept: OPERATING ROOM | Age: 67
End: 2022-03-02
Payer: MEDICARE

## 2022-03-02 ENCOUNTER — APPOINTMENT (OUTPATIENT)
Dept: GENERAL RADIOLOGY | Age: 67
End: 2022-03-02
Attending: ORTHOPAEDIC SURGERY
Payer: MEDICARE

## 2022-03-02 ENCOUNTER — ANESTHESIA (OUTPATIENT)
Dept: OPERATING ROOM | Age: 67
End: 2022-03-02
Payer: MEDICARE

## 2022-03-02 VITALS
DIASTOLIC BLOOD PRESSURE: 74 MMHG | HEART RATE: 78 BPM | OXYGEN SATURATION: 99 % | TEMPERATURE: 97.6 F | WEIGHT: 190 LBS | SYSTOLIC BLOOD PRESSURE: 114 MMHG | HEIGHT: 62 IN | RESPIRATION RATE: 16 BRPM | BODY MASS INDEX: 34.96 KG/M2

## 2022-03-02 VITALS — OXYGEN SATURATION: 97 % | TEMPERATURE: 95.5 F | SYSTOLIC BLOOD PRESSURE: 103 MMHG | DIASTOLIC BLOOD PRESSURE: 58 MMHG

## 2022-03-02 DIAGNOSIS — M17.12 PRIMARY OSTEOARTHRITIS OF LEFT KNEE: Primary | ICD-10-CM

## 2022-03-02 PROCEDURE — 2500000003 HC RX 250 WO HCPCS: Performed by: ANESTHESIOLOGY

## 2022-03-02 PROCEDURE — 97161 PT EVAL LOW COMPLEX 20 MIN: CPT

## 2022-03-02 PROCEDURE — 3600000015 HC SURGERY LEVEL 5 ADDTL 15MIN: Performed by: ORTHOPAEDIC SURGERY

## 2022-03-02 PROCEDURE — 3600000005 HC SURGERY LEVEL 5 BASE: Performed by: ORTHOPAEDIC SURGERY

## 2022-03-02 PROCEDURE — 2500000003 HC RX 250 WO HCPCS: Performed by: ORTHOPAEDIC SURGERY

## 2022-03-02 PROCEDURE — 6360000002 HC RX W HCPCS: Performed by: ANESTHESIOLOGY

## 2022-03-02 PROCEDURE — C1776 JOINT DEVICE (IMPLANTABLE): HCPCS | Performed by: ORTHOPAEDIC SURGERY

## 2022-03-02 PROCEDURE — 7100000000 HC PACU RECOVERY - FIRST 15 MIN: Performed by: ORTHOPAEDIC SURGERY

## 2022-03-02 PROCEDURE — 6360000002 HC RX W HCPCS: Performed by: ORTHOPAEDIC SURGERY

## 2022-03-02 PROCEDURE — 2720000010 HC SURG SUPPLY STERILE: Performed by: ORTHOPAEDIC SURGERY

## 2022-03-02 PROCEDURE — 64447 NJX AA&/STRD FEMORAL NRV IMG: CPT | Performed by: NURSE ANESTHETIST, CERTIFIED REGISTERED

## 2022-03-02 PROCEDURE — 3700000001 HC ADD 15 MINUTES (ANESTHESIA): Performed by: ORTHOPAEDIC SURGERY

## 2022-03-02 PROCEDURE — A4217 STERILE WATER/SALINE, 500 ML: HCPCS | Performed by: ORTHOPAEDIC SURGERY

## 2022-03-02 PROCEDURE — 6360000002 HC RX W HCPCS: Performed by: NURSE ANESTHETIST, CERTIFIED REGISTERED

## 2022-03-02 PROCEDURE — 2580000003 HC RX 258: Performed by: ORTHOPAEDIC SURGERY

## 2022-03-02 PROCEDURE — 73560 X-RAY EXAM OF KNEE 1 OR 2: CPT

## 2022-03-02 PROCEDURE — 6370000000 HC RX 637 (ALT 250 FOR IP): Performed by: ORTHOPAEDIC SURGERY

## 2022-03-02 PROCEDURE — 2500000003 HC RX 250 WO HCPCS: Performed by: NURSE ANESTHETIST, CERTIFIED REGISTERED

## 2022-03-02 PROCEDURE — 7100000001 HC PACU RECOVERY - ADDTL 15 MIN: Performed by: ORTHOPAEDIC SURGERY

## 2022-03-02 PROCEDURE — 3700000000 HC ANESTHESIA ATTENDED CARE: Performed by: ORTHOPAEDIC SURGERY

## 2022-03-02 PROCEDURE — C9290 INJ, BUPIVACAINE LIPOSOME: HCPCS | Performed by: ORTHOPAEDIC SURGERY

## 2022-03-02 PROCEDURE — 7100000010 HC PHASE II RECOVERY - FIRST 15 MIN: Performed by: ORTHOPAEDIC SURGERY

## 2022-03-02 PROCEDURE — 2580000003 HC RX 258: Performed by: NURSE ANESTHETIST, CERTIFIED REGISTERED

## 2022-03-02 PROCEDURE — 7100000011 HC PHASE II RECOVERY - ADDTL 15 MIN: Performed by: ORTHOPAEDIC SURGERY

## 2022-03-02 PROCEDURE — 2709999900 HC NON-CHARGEABLE SUPPLY: Performed by: ORTHOPAEDIC SURGERY

## 2022-03-02 DEVICE — PSN MC VE ASF L 10MM 6-7/EF: Type: IMPLANTABLE DEVICE | Site: KNEE | Status: FUNCTIONAL

## 2022-03-02 DEVICE — PSN FEM CR POR CCR NRW SZ7 L: Type: IMPLANTABLE DEVICE | Site: KNEE | Status: FUNCTIONAL

## 2022-03-02 DEVICE — PSN TIB POR 2 PEG SZ E L: Type: IMPLANTABLE DEVICE | Site: KNEE | Status: FUNCTIONAL

## 2022-03-02 RX ORDER — DEXAMETHASONE SODIUM PHOSPHATE 4 MG/ML
INJECTION, SOLUTION INTRA-ARTICULAR; INTRALESIONAL; INTRAMUSCULAR; INTRAVENOUS; SOFT TISSUE PRN
Status: DISCONTINUED | OUTPATIENT
Start: 2022-03-02 | End: 2022-03-02 | Stop reason: SDUPTHER

## 2022-03-02 RX ORDER — PROPOFOL 10 MG/ML
INJECTION, EMULSION INTRAVENOUS PRN
Status: DISCONTINUED | OUTPATIENT
Start: 2022-03-02 | End: 2022-03-02 | Stop reason: SDUPTHER

## 2022-03-02 RX ORDER — ROPIVACAINE HYDROCHLORIDE 5 MG/ML
INJECTION, SOLUTION EPIDURAL; INFILTRATION; PERINEURAL
Status: COMPLETED | OUTPATIENT
Start: 2022-03-02 | End: 2022-03-02

## 2022-03-02 RX ORDER — PREGABALIN 75 MG/1
75 CAPSULE ORAL ONCE
Status: COMPLETED | OUTPATIENT
Start: 2022-03-02 | End: 2022-03-02

## 2022-03-02 RX ORDER — ONDANSETRON 2 MG/ML
4 INJECTION INTRAMUSCULAR; INTRAVENOUS
Status: COMPLETED | OUTPATIENT
Start: 2022-03-02 | End: 2022-03-02

## 2022-03-02 RX ORDER — HYDROMORPHONE HYDROCHLORIDE 1 MG/ML
0.5 INJECTION, SOLUTION INTRAMUSCULAR; INTRAVENOUS; SUBCUTANEOUS EVERY 5 MIN PRN
Status: DISCONTINUED | OUTPATIENT
Start: 2022-03-02 | End: 2022-03-02 | Stop reason: HOSPADM

## 2022-03-02 RX ORDER — OXYCODONE HCL 10 MG/1
10 TABLET, FILM COATED, EXTENDED RELEASE ORAL ONCE
Status: COMPLETED | OUTPATIENT
Start: 2022-03-02 | End: 2022-03-02

## 2022-03-02 RX ORDER — HYDROMORPHONE HYDROCHLORIDE 1 MG/ML
0.25 INJECTION, SOLUTION INTRAMUSCULAR; INTRAVENOUS; SUBCUTANEOUS EVERY 5 MIN PRN
Status: COMPLETED | OUTPATIENT
Start: 2022-03-02 | End: 2022-03-02

## 2022-03-02 RX ORDER — RIVAROXABAN 10 MG/1
10 TABLET, FILM COATED ORAL EVERY 24 HOURS
COMMUNITY

## 2022-03-02 RX ORDER — OXYCODONE HYDROCHLORIDE 5 MG/1
5 TABLET ORAL SEE ADMIN INSTRUCTIONS
Qty: 90 TABLET | Refills: 0 | Status: SHIPPED | OUTPATIENT
Start: 2022-03-02 | End: 2022-04-02

## 2022-03-02 RX ORDER — OXYCODONE HYDROCHLORIDE 5 MG/1
5 TABLET ORAL
Status: COMPLETED | OUTPATIENT
Start: 2022-03-02 | End: 2022-03-02

## 2022-03-02 RX ORDER — LIDOCAINE HYDROCHLORIDE 10 MG/ML
INJECTION, SOLUTION INFILTRATION; PERINEURAL PRN
Status: DISCONTINUED | OUTPATIENT
Start: 2022-03-02 | End: 2022-03-02 | Stop reason: SDUPTHER

## 2022-03-02 RX ORDER — DOXYCYCLINE HYCLATE 100 MG
100 TABLET ORAL 2 TIMES DAILY
Qty: 10 TABLET | Refills: 0 | Status: SHIPPED | OUTPATIENT
Start: 2022-03-02 | End: 2022-03-07

## 2022-03-02 RX ORDER — SODIUM CHLORIDE, SODIUM LACTATE, POTASSIUM CHLORIDE, CALCIUM CHLORIDE 600; 310; 30; 20 MG/100ML; MG/100ML; MG/100ML; MG/100ML
INJECTION, SOLUTION INTRAVENOUS CONTINUOUS PRN
Status: DISCONTINUED | OUTPATIENT
Start: 2022-03-02 | End: 2022-03-02 | Stop reason: SDUPTHER

## 2022-03-02 RX ORDER — ROCURONIUM BROMIDE 10 MG/ML
INJECTION, SOLUTION INTRAVENOUS PRN
Status: DISCONTINUED | OUTPATIENT
Start: 2022-03-02 | End: 2022-03-02 | Stop reason: SDUPTHER

## 2022-03-02 RX ORDER — ONDANSETRON 2 MG/ML
INJECTION INTRAMUSCULAR; INTRAVENOUS PRN
Status: DISCONTINUED | OUTPATIENT
Start: 2022-03-02 | End: 2022-03-02 | Stop reason: SDUPTHER

## 2022-03-02 RX ORDER — FENTANYL CITRATE 50 UG/ML
INJECTION, SOLUTION INTRAMUSCULAR; INTRAVENOUS PRN
Status: DISCONTINUED | OUTPATIENT
Start: 2022-03-02 | End: 2022-03-02 | Stop reason: SDUPTHER

## 2022-03-02 RX ORDER — ONDANSETRON 4 MG/1
4 TABLET, FILM COATED ORAL EVERY 8 HOURS PRN
Qty: 30 TABLET | Refills: 0 | Status: SHIPPED | OUTPATIENT
Start: 2022-03-02

## 2022-03-02 RX ORDER — DEXAMETHASONE SODIUM PHOSPHATE 10 MG/ML
10 INJECTION, SOLUTION INTRAMUSCULAR; INTRAVENOUS ONCE
Status: DISCONTINUED | OUTPATIENT
Start: 2022-03-02 | End: 2022-03-02 | Stop reason: HOSPADM

## 2022-03-02 RX ORDER — ROPIVACAINE HYDROCHLORIDE 5 MG/ML
INJECTION, SOLUTION EPIDURAL; INFILTRATION; PERINEURAL
Status: COMPLETED
Start: 2022-03-02 | End: 2022-03-02

## 2022-03-02 RX ORDER — ACETAMINOPHEN 500 MG
1000 TABLET ORAL ONCE
Status: COMPLETED | OUTPATIENT
Start: 2022-03-02 | End: 2022-03-02

## 2022-03-02 RX ORDER — MIDAZOLAM HYDROCHLORIDE 1 MG/ML
2 INJECTION INTRAMUSCULAR; INTRAVENOUS
Status: COMPLETED | OUTPATIENT
Start: 2022-03-02 | End: 2022-03-02

## 2022-03-02 RX ADMIN — FENTANYL CITRATE 50 MCG: 50 INJECTION, SOLUTION INTRAMUSCULAR; INTRAVENOUS at 08:45

## 2022-03-02 RX ADMIN — FENTANYL CITRATE 50 MCG: 50 INJECTION, SOLUTION INTRAMUSCULAR; INTRAVENOUS at 08:04

## 2022-03-02 RX ADMIN — OXYCODONE HYDROCHLORIDE 10 MG: 10 TABLET, FILM COATED, EXTENDED RELEASE ORAL at 06:50

## 2022-03-02 RX ADMIN — OXYCODONE HYDROCHLORIDE 5 MG: 5 TABLET ORAL at 11:06

## 2022-03-02 RX ADMIN — ROCURONIUM BROMIDE 50 MG: 10 INJECTION, SOLUTION INTRAVENOUS at 07:36

## 2022-03-02 RX ADMIN — PHENYLEPHRINE HYDROCHLORIDE 100 MCG: 10 INJECTION INTRAVENOUS at 07:53

## 2022-03-02 RX ADMIN — PREGABALIN 75 MG: 75 CAPSULE ORAL at 06:50

## 2022-03-02 RX ADMIN — MIDAZOLAM 2 MG: 1 INJECTION INTRAMUSCULAR; INTRAVENOUS at 07:03

## 2022-03-02 RX ADMIN — SUGAMMADEX 250 MG: 100 INJECTION, SOLUTION INTRAVENOUS at 08:52

## 2022-03-02 RX ADMIN — HYDROMORPHONE HYDROCHLORIDE 0.25 MG: 1 INJECTION, SOLUTION INTRAMUSCULAR; INTRAVENOUS; SUBCUTANEOUS at 09:20

## 2022-03-02 RX ADMIN — HYDROMORPHONE HYDROCHLORIDE 0.25 MG: 1 INJECTION, SOLUTION INTRAMUSCULAR; INTRAVENOUS; SUBCUTANEOUS at 09:50

## 2022-03-02 RX ADMIN — ONDANSETRON 4 MG: 2 INJECTION INTRAMUSCULAR; INTRAVENOUS at 09:33

## 2022-03-02 RX ADMIN — FENTANYL CITRATE 50 MCG: 50 INJECTION, SOLUTION INTRAMUSCULAR; INTRAVENOUS at 08:19

## 2022-03-02 RX ADMIN — DEXAMETHASONE SODIUM PHOSPHATE 10 MG: 4 INJECTION, SOLUTION INTRAMUSCULAR; INTRAVENOUS at 07:47

## 2022-03-02 RX ADMIN — LIDOCAINE HYDROCHLORIDE 50 MG: 10 INJECTION, SOLUTION INFILTRATION; PERINEURAL at 07:36

## 2022-03-02 RX ADMIN — HYDROMORPHONE HYDROCHLORIDE 0.25 MG: 1 INJECTION, SOLUTION INTRAMUSCULAR; INTRAVENOUS; SUBCUTANEOUS at 09:25

## 2022-03-02 RX ADMIN — SODIUM CHLORIDE, SODIUM LACTATE, POTASSIUM CHLORIDE, AND CALCIUM CHLORIDE: 600; 310; 30; 20 INJECTION, SOLUTION INTRAVENOUS at 07:33

## 2022-03-02 RX ADMIN — HYDROMORPHONE HYDROCHLORIDE 0.25 MG: 1 INJECTION, SOLUTION INTRAMUSCULAR; INTRAVENOUS; SUBCUTANEOUS at 09:37

## 2022-03-02 RX ADMIN — ACETAMINOPHEN 1000 MG: 500 TABLET ORAL at 06:50

## 2022-03-02 RX ADMIN — PROPOFOL 10 MG: 10 INJECTION, EMULSION INTRAVENOUS at 07:36

## 2022-03-02 RX ADMIN — ROPIVACAINE HYDROCHLORIDE 20 ML: 5 INJECTION, SOLUTION EPIDURAL; INFILTRATION; PERINEURAL at 07:10

## 2022-03-02 RX ADMIN — Medication 2000 MG: at 12:02

## 2022-03-02 RX ADMIN — FENTANYL CITRATE 50 MCG: 50 INJECTION, SOLUTION INTRAMUSCULAR; INTRAVENOUS at 07:36

## 2022-03-02 RX ADMIN — SODIUM CHLORIDE, SODIUM LACTATE, POTASSIUM CHLORIDE, AND CALCIUM CHLORIDE: 600; 310; 30; 20 INJECTION, SOLUTION INTRAVENOUS at 08:19

## 2022-03-02 RX ADMIN — Medication 2000 MG: at 07:46

## 2022-03-02 RX ADMIN — ONDANSETRON 4 MG: 2 INJECTION INTRAMUSCULAR; INTRAVENOUS at 07:47

## 2022-03-02 RX ADMIN — HYDROMORPHONE HYDROCHLORIDE 0.25 MG: 1 INJECTION, SOLUTION INTRAMUSCULAR; INTRAVENOUS; SUBCUTANEOUS at 09:42

## 2022-03-02 RX ADMIN — FAMOTIDINE 20 MG: 10 INJECTION, SOLUTION INTRAVENOUS at 06:50

## 2022-03-02 ASSESSMENT — PAIN DESCRIPTION - PAIN TYPE
TYPE: SURGICAL PAIN
TYPE: SURGICAL PAIN

## 2022-03-02 ASSESSMENT — PAIN SCALES - GENERAL
PAINLEVEL_OUTOF10: 4
PAINLEVEL_OUTOF10: 5
PAINLEVEL_OUTOF10: 4
PAINLEVEL_OUTOF10: 6
PAINLEVEL_OUTOF10: 4
PAINLEVEL_OUTOF10: 6
PAINLEVEL_OUTOF10: 5

## 2022-03-02 ASSESSMENT — PAIN DESCRIPTION - LOCATION
LOCATION: KNEE
LOCATION: KNEE

## 2022-03-02 ASSESSMENT — PAIN DESCRIPTION - ORIENTATION
ORIENTATION: LEFT
ORIENTATION: LEFT

## 2022-03-02 ASSESSMENT — LIFESTYLE VARIABLES: SMOKING_STATUS: 0

## 2022-03-02 ASSESSMENT — PAIN - FUNCTIONAL ASSESSMENT: PAIN_FUNCTIONAL_ASSESSMENT: 0-10

## 2022-03-02 NOTE — CARE COORDINATION
Shell Nuñez RN Ortho Navigator  [phone]    Patient would like dme item to be delivered to [id number]. Patient is scheduled to discharge home today at 11 am.  Please call if you have any questions. Rodolfo Moreau  3/2/2022  6:23 AM   Admission  Description: 73year old female Department: [facility] OR       Patient Demographics    Name Patient ID SSN Gender Identity Birth Date   James Raya [id number] Female [birth year] (66 yrs)     Address Phone Email     [address] [phone] (Y)   [phone] (H)   [phone] (OTHER PHONE) [email or web address]       Reg Status PCP Date Last Verified Next Review Date    Verified Miladis Rubio  [phone] 03/02/22 04/01/22        Insurance Payors as of 3/2/2022      [facility]    [facility] Member: [id number] Effective from: 3/20/2016   Subscriber: Rylan [employer] Subscriber ID: [id number] Guarantor: Lori Navarro MEDICARE    Plan: [place] [facility] MEDICARE Group: [id number] Member: [id number]   Effective from: 11/1/2020 Subscriber: Seymour Pena Subscriber ID: [id number]   Guarantor: Seymour Pena       Emergency Contact(s)    Name Relation Home Work Mobile   Naren Hugo Spouse [phone]  [address] [place] [address] Brother/Sister [phone]       Electronically signed by Alejo William RN on 3/2/2022 at 9:18 AM

## 2022-03-02 NOTE — ANESTHESIA PRE PROCEDURE
Department of Anesthesiology  Preprocedure Note       Name:  Davis Santiago   Age:  73 y.o.  :  1955                                          MRN:  [id number]         Date:  3/2/2022      Surgeon: Naomy Persaud):  Michael Osorio MD    Procedure: Procedure(s):  LEFT TOTAL KNEE ARTHROPLASTY    Medications prior to admission:   Prior to Admission medications    Medication Sig Start Date End Date Taking?  Authorizing Provider   rivaroxaban (XARELTO) 10 MG TABS tablet Take 10 mg by mouth every 24 hours   Yes Historical Provider, MD   buPROPion (WELLBUTRIN XL) 300 MG extended release tablet Take 300 mg by mouth every morning   Yes Historical Provider, MD   lisinopril-hydroCHLOROthiazide (PRINZIDE;ZESTORETIC) 20-12.5 MG per tablet Take 1 tablet by mouth daily   Yes Historical Provider, MD   meloxicam (MOBIC) 15 MG tablet Take 15 mg by mouth daily   Yes Historical Provider, MD   levothyroxine (SYNTHROID) 25 MCG tablet Take 25 mcg by mouth Daily   Yes Historical Provider, MD       Current medications:    Current Facility-Administered Medications   Medication Dose Route Frequency Provider Last Rate Last Admin    acetaminophen (TYLENOL) tablet 1,000 mg  1,000 mg Oral Once Michael Osorio MD        ceFAZolin (ANCEF) 2000 mg in 0.9% sodium chloride 50 mL IVPB  2,000 mg IntraVENous Once Michael Osorio MD        dexamethasone (PF) (DECADRON) injection 10 mg  10 mg IntraVENous Once Michael Osorio MD        oxyCODONE (OXYCONTIN) extended release tablet 10 mg  10 mg Oral Once Michael Osorio MD        pregabalin (LYRICA) capsule 75 mg  75 mg Oral Once Michael Osorio MD           Allergies:  No Known Allergies    Problem List:    Patient Active Problem List   Diagnosis Code    Traumatic tear of medial meniscus of knee, left, subsequent encounter L35.713R       Past Medical History:        Diagnosis Date    Welch esophagus     Chronic back pain     Hypertension     Thyroid disease        Past Surgical History: Procedure Laterality Date    APPENDECTOMY      CHOLECYSTECTOMY      FRACTURE SURGERY      multiple fractures in multiple locations   [place] [address] REPLACEMENT Right     knee (Dr. Marian Angulo)    KNEE ARTHROSCOPY Left 6/10/2021    LEFT KNEE PARTIAL MEDIAL MENISCECTOMY, PATELLAR CHONDROPLASTY performed by Montell Koyanagi, MD at [facility] OR       Social History:    Social History     Tobacco Use    Smoking status: Former Smoker     Packs/day: 2.00     Years: 40.00     Pack years: 80.00     Types: Cigarettes     Quit date:      Years since quittin.1    Smokeless tobacco: Never Used   Substance Use Topics    Alcohol use: No                                Counseling given: Not Answered      Vital Signs (Current):   Vitals:    22 0639   BP: 121/81   Pulse: 78   Resp: 14   Temp: 97.2 °F (36.2 °C)   TempSrc: Tympanic   SpO2: 98%   Weight: 190 lb (86.2 kg)   Height: 5' 2\" (1.575 m)                                              BP Readings from Last 3 Encounters:   22 121/81   06/10/21 (!) 143/79   06/10/21 105/64       NPO Status:                                                                                 BMI:   Wt Readings from Last 3 Encounters:   22 190 lb (86.2 kg)   22 190 lb (86.2 kg)   21 186 lb (84.4 kg)     Body mass index is 34.75 kg/m².     CBC:   Lab Results   Component Value Date    WBC 8.5 2022    RBC 4.04 2022    HGB 12.2 2022    HCT 40.0 2022    MCV 99.0 2022    RDW 12.7 2022     2022       CMP:   Lab Results   Component Value Date     2022    K 4.0 2022     2022    CO2 24 2022    BUN 30 2022    CREATININE 2.0 2022    GFRAA 30 2022    LABGLOM 25 2022    GLUCOSE 83 2022    PROT 7.1 2022    PROT 6.6 10/25/2012    CALCIUM 9.7 2022    BILITOT 0.3 2022    ALKPHOS 100 2022    AST 14 2022    ALT 12 2022       POC patient and sibling. Use of blood products discussed with patient and sibling whom consented to blood products.                    Irma Bacon MD   3/2/2022

## 2022-03-02 NOTE — BRIEF OP NOTE
Brief Postoperative Note      Patient: Gustavo Goel  YOB: 1955  MRN: [id number]    Date of Procedure: 3/2/2022    Pre-Op Diagnosis: m17.12    Post-Op Diagnosis: Same       Procedure(s):  LEFT TOTAL KNEE ARTHROPLASTY    Surgeon(s):  Davis Jade MD    Assistant:   Assistant: Marie Saavedra    Anesthesia: General    Estimated Blood Loss (mL): less than 109     Complications: None    Specimens:   * No specimens in log *    Implants:  Implant Name Type Inv.  Item Serial No.  Lot No. LRB No. Used Action   PSN FEM CR POR CCR NRW SZ7 L - [id number]  PSN FEM CR POR CCR NRW SZ7 L  [employer]- [id number] Left 1 Implanted   PSN TIB POR 2 PEG SZ E L - [id number]  PSN TIB POR 2 PEG SZ E L  [employer]- [id number] Left 1 Implanted   PSN MC VE ASF L 10MM 6-7/EF - [id number]  PSN MC VE ASF L 10MM 6-7/EF  [employer]- [id number] Left 1 Implanted         Drains: * No LDAs found *    Findings: TT:17 minutes    Electronically signed by Davis Jade MD on 3/2/2022 at 8:58 AM

## 2022-03-02 NOTE — ANESTHESIA POSTPROCEDURE EVALUATION
Department of Anesthesiology  Postprocedure Note    Patient: Luis Armando Nina  MRN: [id number]  YOB: 1955  Date of evaluation: 3/2/2022  Time:  9:15 AM     Procedure Summary     Date: 03/02/22 Room / Location: [address]    Anesthesia Start: 4055 Anesthesia Stop: 6772    Procedure: LEFT TOTAL KNEE ARTHROPLASTY (Left Knee) Diagnosis: (m17.12)    Surgeons: Joseph Fischer MD Responsible Provider: DERRICK Hodgson CRNA    Anesthesia Type: general ASA Status: 3          Anesthesia Type: general    Merced Phase I: Merced Score: 10    Merced Phase II:      Last vitals: Reviewed and per EMR flowsheets.        Anesthesia Post Evaluation    Patient location during evaluation: PACU  Patient participation: complete - patient participated  Level of consciousness: sleepy but conscious  Pain score: 0  Airway patency: patent  Nausea & Vomiting: no nausea and no vomiting  Complications: no  Cardiovascular status: hemodynamically stable  Respiratory status: acceptable, spontaneous ventilation and room air  Hydration status: euvolemic

## 2022-03-02 NOTE — PROGRESS NOTES
Physical Therapy  Physical Therapy     Facility/Department: [facility] OR  Initial Assessment  PHYSICAL THERAPY EVALUATION      Shante Torre    : 1955  MRN: [id number]   PHYSICIAN:  Baldemar Oropeza MD  Primary Problem    Patient Active Problem List   Diagnosis    Traumatic tear of medial meniscus of knee, left, subsequent encounter       Rehabilitation Diagnosis:     m17.12 L TKR, WBAT      SERVICE DATE: 3/2/2022        SUBJECTIVE: Patient states that they are planning on discharging home today    Pain Screening  Patient Currently in Pain: No    PRIOR LEVEL OF FUNCTION:    [x] Independent in community no assistive device     [] Independent in community with assistive device     [] Independent in the house with assistive device     [] Transfer only    []     OBJECTIVE:  Orientation: Within functional Limits      ROM - Passive, Non-operative side     [] Left lower extremity  [x] Right lower extremity       Within functional limits    ROM - Passive, operative side    [x] Left lower extremity  [] Right lower extremity      Hip - extension to 0 degrees and flexion to 80 in sitting    Knee - extension to 0 degrees in supine and flexion to 80 in sitting        STRENGTH - Non-operative side    [] Left lower extremity  [x] Right lower extremity       Within functional limits    STRENGTH - operative side    [x] Left lower extremity  [] Right lower extremity    Grossly  3-/5        TRANSFERS   Sit to stand     [x] CGA [] Minimum        Bed to chair     [x] CGA [] Minimum    Bed mobility   Supine to sit      [x] CGA [] Minimum      Scoot  [] Side to side  [] Up and down     [x] CGA [] Minimum    AMBULATION   Weight bearing: - WBAT      Distance:40 ft   Device: Rolling Yanique Nina - The patient has been trained on the use of this equipment     Assistance:       [x] CGA [] Modified Independent [] Stand by / Supervision [] Minimum         BALANCE   Sitting    Good    Standing    Good     ASSESSMENT   Activity limitations: Decreased functional mobility   Patient will benefit from continuing skilled physical therapy to improve mobility    Activity Tolerance  Activity Tolerance: Patient Tolerated treatment well     PLAN        Physical therapy to see 5- 7 X/ week for 2 weeks then reassess. Plan of care to include:   Current Treatment Recommendations: Functional Mobility Training, Transfer Training  Gait Training, Safety Education & Training, Patient/Caregiver Education & Training    PT Education     Precautions; Transfer Training; General Safety; Family Education; Equipment; Weight-bearing Education; Gait Training; Functional Mobility Training.   Use of gt belt, performance of steps, dr gray protocol      GOALS    Short term goals  Time Frame for Short term goals: 2 weeks  Short term goal 1: Independent with bed mobility and transfers  Short term goal 2: Ambulate 410 feet independently  Short term goal 3: Verbalize understanding of ascending and descending steps    Discharge Recommendations:  Home with assist PRN, Patient would benefit from continued therapy after discharge     Comments:  Patient seen in same day surgery and nursing is actively managing pain  Returned to bed   Call light within reach  Patient instructed to request assistance before getting up  Nursing updated      Electronically signed by Gibran Chapin PT on 3/2/2022 at 12:11 PM  Electronically signed by Gibran Chapin PT on 3/2/2022 at 12:13 PM

## 2022-03-02 NOTE — OP NOTE
[employer] [facility] T[address], [address] [facility]                                OPERATIVE REPORT    PATIENT NAME: Raymond Conner                     :        1955  MED REC NO:   [id number]                              ROOM:  ACCOUNT NO:   [de-identified]                           ADMIT DATE: 2022  PROVIDER:     Shikha Jackson MD    DATE OF PROCEDURE:  2022    PREOPERATIVE DIAGNOSIS:  Primary osteoarthritis, left knee. POSTOPERATIVE DIAGNOSIS:  Primary osteoarthritis, left knee. PROCEDURE:  Complex primary left total knee replacement. Please note,  complexity of the surgery is due to the fact that the patient has a  history of PE and surgery was done while fully anticoagulated. This  added 100% increase in difficulty in exposure and placement of implants. SURGEON:  Shikha Jackson MD    FIRST ASSISTANT:  Marie Saavedra. ANESTHESIA:  General with adductor canal block. EBL:  100 mL. FLUIDS:  1500 mL of crystalloid. TOURNIQUET TIME:  17 minutes. COMPONENTS USED:  Nesha Persona knee system, femur size 7 narrow CR TM  press-fit femur, tibia size E TM press-fit tibia, polyethylene size 10  MC polyethylene. INDICATIONS:  This is a 62-year-old lady with severe arthritis of the  left knee, failing conservative care. Because of this, she elected for  the above procedure. OPERATIVE PROCEDURE:  After informed consent, she was given 2 gm of  Ancef, underwent adductor canal block, general anesthesia. Tourniquet  was placed around the left upper thigh. Right leg was placed in SCD. Please note that the patient's preoperative Doppler ultrasounds were  completely negative on 2022. Left leg and knee were prepped and  draped in the usual sterile fashion. Leg was Esmarched. Tourniquet was  inflated to 200 mmHg. Midline incision was made. Parapatellar approach  was made. Prepatellar fat pad was partially excised.   Synovium on the  distal femur was elevated. Intramedullary canal of the femur was  drilled into. Distal resection was made in 3 degrees of valgus taking a  +0 cut. Medial resection was 9 mm, lateral was 4.5 mm. We then sized  this to a size 7 femur. This was placed in 5 degrees of external  rotation. Anterior, posterior, and chamfer cuts were made. Posteromedial cut 10.5 mm, posterolateral cut 5 mm. Tibia was exposed. Menisci excised. The 7-degree cutting guide was placed in line with the  anterior tibial crest proximally, middle of ankle distally and tibial  resection was made. We had equal flexion and extension gaps. The  tourniquet was released. We trialed with a size E tibia with 7 femur. Trial reduction showed excellent flexion and extension and very well  balanced stable knee. Fluoroscopic views revealed a very nice  perpendicular cut to the long axis of the tibia. We then performed a  lateral facet release. Lug holes were drilled for the femur and the  tibia. I felt we had very good bone for press-fit fixation. We then  mixed 20 mL of Exparel with 30 mL of saline and 50 mL of 0.25% Marcaine. We injected the posteromedial capsule with 40 mL and 60 mL in the  subcutaneous tissues. We then press-fit the size E tibia followed by  the size 7 narrow femoral component. Repeat trial reduction was done. There was no need to release the PCL at all. We then irrigated with  another 1500 mL of normal saline. The final size 10 mm liner was locked  in the tibial tray. We had full extension of the knee with perfect  midline tracking of the patella. Again, we used a total of 3 liters of  irrigation. Parapatellar approach was closed with interrupted #1 Vicryl  suture, 2-0 Vicryl suture for subcutaneous tissues, and 3-0 Vicryl for  subcuticular stitch. Prineo Dermabond and soft dressings were applied. The patient was taken to the recovery room in stable condition. POSTOP PLANS:  1.   The patient will be on typical postop outpatient protocol. 2.  She will be on doxycycline 100 mg twice a day for 5 days. 3.  She will be on Xarelto 10 mg a day for 6 weeks postop. Please note, prior to surgery, the patient's range of motion was 2 to  110 degrees on the left knee.         Nelyl Arnold MD    D: 03/02/2022 9:57:06      T: 03/02/2022 10:52:14     [id number]  Job#: [id number]     Doc#: [id number]    CC:

## 2022-03-02 NOTE — DISCHARGE INSTR - DIET
Good nutrition is important when healing from an illness, injury, or surgery. Follow any nutrition recommendations given to you during your hospital stay. If you were given an oral nutrition supplement while in the hospital, continue to take this supplement at home. You can take it with meals, in-between meals, and/or before bedtime. These supplements can be purchased at most local grocery stores, pharmacies, and chain [employer]-stores. If you have any questions about your diet or nutrition, call the hospital and ask for the dietitian.             Low carb / High protein

## 2022-03-02 NOTE — PROGRESS NOTES
Patient discharged home today with Home Care. Went over all discharge instructions and new medications with patient's sister and provided a copy of all new medications to take home. Patient's sister verbalized understanding. Patient's stability will be assessed by PT and Op Care RN before discharging home.   Electronically signed by Ziggy Alberts RN on 3/2/2022 at 10:11 AM

## 2022-03-02 NOTE — H&P
[facility]) Pre-Operative History and Physical    Patient Name: Dior Michaels  : 1955    /81   Pulse 78   Temp 97.2 °F (36.2 °C) (Tympanic)   Resp 14   Ht 5' 2\" (1.575 m)   Wt 190 lb (86.2 kg)   SpO2 98%   Breastfeeding No   BMI 34.75 kg/m²     Pre-Operative Diagnosis:  Knee arthritits    Proposed Surgical Procedure:   Knee replacement      Past Medical Hisotry:       Diagnosis Date    Welch esophagus     Chronic back pain     Hypertension     Thyroid disease      Past Surgical History:       Procedure Laterality Date    APPENDECTOMY      CHOLECYSTECTOMY      FRACTURE SURGERY      multiple fractures in multiple locations    HYSTERECTOMY      JOINT REPLACEMENT Right     knee (Dr. Kaye Vences)    KNEE ARTHROSCOPY Left 6/10/2021    LEFT KNEE PARTIAL MEDIAL MENISCECTOMY, PATELLAR CHONDROPLASTY performed by Roselie Schaumann, MD at [facility] OR       Medications:   Prior to Admission medications    Medication Sig Start Date End Date Taking? Authorizing Provider   rivaroxaban (XARELTO) 10 MG TABS tablet Take 10 mg by mouth every 24 hours   Yes Historical Provider, MD   buPROPion (WELLBUTRIN XL) 300 MG extended release tablet Take 300 mg by mouth every morning   Yes Historical Provider, MD   lisinopril-hydroCHLOROthiazide (PRINZIDE;ZESTORETIC) 20-12.5 MG per tablet Take 1 tablet by mouth daily   Yes Historical Provider, MD   meloxicam (MOBIC) 15 MG tablet Take 15 mg by mouth daily   Yes Historical Provider, MD   levothyroxine (SYNTHROID) 25 MCG tablet Take 25 mcg by mouth Daily   Yes Historical Provider, MD       Allergies:  Patient has no known allergies. Social History:   Tobacco:  reports that she quit smoking about 8 years ago. Her smoking use included cigarettes. She has a 80.00 pack-year smoking history. She has never used smokeless tobacco.   Alcohol:  reports no history of alcohol use.     Review of Systems:  General: Denies any fever or chills  EYES: Denies any diplopia  ENT: Tinnitus or vertigo  Resp: Denies any shortness of breath, cough or wheezing  Cardiac: Denies any chest pain, palpitations, claudication or edema  GI: Denies any melena, hematochezia, hematemesis or pyrosis  : Denies any frequency, urgency, hesitancy or incontinence  Musculoskeletal: Denies back pain, joint pain, myalgias  Heme: Denies bruising or bleeding easily  Endocrine: Denies any history of diabetes or thyroid disease  Psych: Denies anxiety or depression  Neuro: Denies any focal motor or sensory deficits    NEUROLOGIC: CN II-XI grossly intact, no motor or sensory deficits   PSYCH: mood and affect are normal with a normal rate and tone of speech    Physical Exam:  Vitals: /81   Pulse 78   Temp 97.2 °F (36.2 °C) (Tympanic)   Resp 14   Ht 5' 2\" (1.575 m)   Wt 190 lb (86.2 kg)   SpO2 98%   Breastfeeding No   BMI 34.75 kg/m²   CONSTITUTIONAL: Alert, appropriate, no acute distress. EYES: Non icteric, EOM intact, pupils equal round and reactive to light  ENT: Mucus membranes moist, no oral pharyngeal lesions, nares patent   NECK: Supple, no masses, no JVD, trachea mid line   CHEST/LUNGS: CTA bilaterally, normal respiratory effort   CARDIOVASCULAR: RRR, no murmurs,  2+ DP and radial pulses bilaterally  ABDOMEN: soft, nontender  EXTREMITIES: warm, well perfused, no edema   SKIN: warm, dry with no rashes or lesions  LYMPH: No cervical or inguinal lymphadenopathy    General Appearance: Patient is well nourished, well developed    HEENT: Normal    CARDIOVASCULAR: Normal S1 and S2.  Regular rhythm. No murmurs, gallops, or rubs. PULMONARY: Normal.    GASTROINTESTINAL: Soft, non-tender, normal bowel sounds. No bruits, organomegaly or masses.     EXTREMITIES: positive exam findings: lateral joint line tenderness, tender over tibial tubercle, ecchymosis noted entire limb and ROM limited to approximately 80 degrees    MUSCULOSKELETAL: Tenderness over right hip(s)    NEUROLOGICAL: gait and coordination normal or speech normal    Diagnostic Studies:      Labs: CBC with Differential:    Lab Results   Component Value Date    WBC 8.5 01/20/2022    RBC 4.04 01/20/2022    HGB 12.2 01/20/2022    HCT 40.0 01/20/2022     01/20/2022    MCV 99.0 01/20/2022    MCH 30.2 01/20/2022    MCHC 30.5 01/20/2022    RDW 12.7 01/20/2022    LYMPHOPCT 36.6 01/20/2022    MONOPCT 4.8 01/20/2022    BASOPCT 1.1 01/20/2022    MONOSABS 0.40 01/20/2022    LYMPHSABS 3.1 01/20/2022    EOSABS 0.20 01/20/2022    BASOSABS 0.10 01/20/2022     BMP:    Lab Results   Component Value Date     01/20/2022    K 4.0 01/20/2022     01/20/2022    CO2 24 01/20/2022    BUN 30 01/20/2022    LABALBU 4.4 01/20/2022    CREATININE 2.0 01/20/2022    CALCIUM 9.7 01/20/2022    GFRAA 30 01/20/2022    LABGLOM 25 01/20/2022    GLUCOSE 83 01/20/2022     Sodium:    Lab Results   Component Value Date     01/20/2022     Potassium:    Lab Results   Component Value Date    K 4.0 01/20/2022     BUN/Creatinine:    Lab Results   Component Value Date    BUN 30 01/20/2022    CREATININE 2.0 01/20/2022     PT/INR:    Lab Results   Component Value Date    PROTIME 13.4 01/20/2022    INR 1.00 01/20/2022     PTT:    Lab Results   Component Value Date    APTT 29.8 01/20/2022    PTT 29.2 03/10/2015   [APTT}  U/A:    Lab Results   Component Value Date    COLORU YELLOW 01/20/2022    PHUR 5.0 01/20/2022    WBCUA 38 01/20/2022    RBCUA 2 01/20/2022    BACTERIA NEGATIVE 01/20/2022    CLARITYU CLOUDY 01/20/2022    SPECGRAV 1.021 01/20/2022    LEUKOCYTESUR MODERATE 01/20/2022    UROBILINOGEN 0.2 01/20/2022    BILIRUBINUR Negative 01/20/2022    BLOODU Negative 01/20/2022    GLUCOSEU Negative 01/20/2022     HgBA1c:  No components found for: HGBA1C        PLAN:  Knee replacement      Electronically signed by Don Carreno MD on 3/2/2022 at 6:54 AM

## 2022-03-02 NOTE — ANESTHESIA PROCEDURE NOTES
Peripheral Block    Patient location during procedure: holding area  Start time: 3/2/2022 7:09 AM  End time: 3/2/2022 7:11 AM  Staffing  Performed: anesthesiologist   Anesthesiologist: Regla Heard MD  Preanesthetic Checklist  Completed: patient identified, IV checked, site marked, risks and benefits discussed, surgical consent, monitors and equipment checked, pre-op evaluation, timeout performed, anesthesia consent given, oxygen available and patient being monitored  Peripheral Block  Patient position: supine  Prep: ChloraPrep  Patient monitoring: continuous pulse ox and frequent blood pressure checks  Block type: Femoral  Laterality: left  Injection technique: single-shot  Guidance: ultrasound guided  Local infiltration: lidocaine  Adductor canal  Provider prep: mask and sterile gloves  Local infiltration: lidocaine  Needle  Needle type: Quincke   Needle gauge: 20 G  Needle length: 10 cm  Needle localization: ultrasound guidance  Assessment  Injection assessment: negative aspiration for heme, no paresthesia on injection and local visualized surrounding nerve on ultrasound  Paresthesia pain: none  Slow fractionated injection: yes  Hemodynamics: stable  Medications Administered  Ropivacaine (NAROPIN) injection 0.5%, 20 mL  Reason for block: post-op pain management

## 2022-03-02 NOTE — CARE COORDINATION
HH referral received. Spoke with patient regarding [facility] services. Patient is agreeable and selected [facility]. Referral called to [facility], spoke with Carli.   Referral faxed and PENDING    Electronically signed by Von Miranda on 3/2/22 at 10:44 AM CST

## 2022-03-02 NOTE — PROGRESS NOTES
CLINICAL PHARMACY NOTE: MEDS TO BEDS    Total # of Prescriptions Filled: 3   The following medications were delivered to the patient:  · Ondansetron 4mg  · Doxycycline hyclate 100mg  · Oxycodone 5mg    Additional Documentation:  The patients family member paid with cash in the patients room in [facility]. The medications were handed to the family member.

## 2022-03-04 ENCOUNTER — TELEPHONE (OUTPATIENT)
Dept: INPATIENT UNIT | Age: 67
End: 2022-03-04

## 2022-05-05 RX ORDER — SUCRALFATE 1 G/1
1 TABLET ORAL 4 TIMES DAILY
COMMUNITY
End: 2022-11-16

## 2022-05-05 RX ORDER — BUPROPION HYDROCHLORIDE 150 MG/1
150 TABLET ORAL DAILY
COMMUNITY

## 2022-05-05 RX ORDER — LEVOTHYROXINE SODIUM 0.03 MG/1
25 TABLET ORAL DAILY
COMMUNITY

## 2022-05-05 RX ORDER — TRAZODONE HYDROCHLORIDE 50 MG/1
50 TABLET ORAL NIGHTLY
COMMUNITY
End: 2022-05-06 | Stop reason: ALTCHOICE

## 2022-05-05 RX ORDER — OXYCODONE HYDROCHLORIDE AND ACETAMINOPHEN 5; 325 MG/1; MG/1
1 TABLET ORAL EVERY 6 HOURS PRN
COMMUNITY
End: 2022-11-16

## 2022-05-05 RX ORDER — PANTOPRAZOLE SODIUM 40 MG/1
40 TABLET, DELAYED RELEASE ORAL DAILY
COMMUNITY
End: 2022-11-16

## 2022-05-05 RX ORDER — MELOXICAM 15 MG/1
15 TABLET ORAL DAILY
COMMUNITY
End: 2022-05-06 | Stop reason: ALTCHOICE

## 2022-05-05 RX ORDER — SIMETHICONE 125 MG
125 TABLET,CHEWABLE ORAL EVERY 6 HOURS PRN
COMMUNITY
End: 2022-05-06

## 2022-05-05 RX ORDER — LISINOPRIL AND HYDROCHLOROTHIAZIDE 20; 12.5 MG/1; MG/1
1 TABLET ORAL DAILY
COMMUNITY
End: 2022-11-16

## 2022-05-06 ENCOUNTER — DOCUMENTATION (OUTPATIENT)
Dept: NEUROLOGY | Facility: CLINIC | Age: 67
End: 2022-05-06

## 2022-05-06 ENCOUNTER — OFFICE VISIT (OUTPATIENT)
Dept: NEUROLOGY | Facility: CLINIC | Age: 67
End: 2022-05-06

## 2022-05-06 VITALS
BODY MASS INDEX: 34.6 KG/M2 | RESPIRATION RATE: 18 BRPM | SYSTOLIC BLOOD PRESSURE: 148 MMHG | WEIGHT: 188 LBS | HEART RATE: 74 BPM | HEIGHT: 62 IN | DIASTOLIC BLOOD PRESSURE: 90 MMHG

## 2022-05-06 DIAGNOSIS — R79.9 ABNORMAL FINDING OF BLOOD CHEMISTRY, UNSPECIFIED: ICD-10-CM

## 2022-05-06 DIAGNOSIS — I10 ESSENTIAL (PRIMARY) HYPERTENSION: ICD-10-CM

## 2022-05-06 DIAGNOSIS — R26.89 IMBALANCE: ICD-10-CM

## 2022-05-06 DIAGNOSIS — R41.3 MEMORY DIFFICULTY: Primary | ICD-10-CM

## 2022-05-06 PROCEDURE — 99204 OFFICE O/P NEW MOD 45 MIN: CPT | Performed by: PSYCHIATRY & NEUROLOGY

## 2022-05-06 RX ORDER — MIRTAZAPINE 15 MG/1
7.5 TABLET, FILM COATED ORAL NIGHTLY
COMMUNITY

## 2022-05-06 RX ORDER — ONDANSETRON 4 MG/1
4 TABLET, FILM COATED ORAL 3 TIMES DAILY
COMMUNITY
End: 2022-11-16

## 2022-05-06 RX ORDER — PERPHENAZINE/AMITRIPTYLINE HCL 2 MG-25 MG
TABLET ORAL DAILY
COMMUNITY
End: 2022-11-16

## 2022-05-06 NOTE — PATIENT INSTRUCTIONS
.Patient to contact us tomorrow as to when and if she can proceed with MRI of the brain patient to have driving and safety precautions as discussed.  Patient to get with PCP about weight and diet control about kidney dysfunction.  Patient to get to emergency room immediately for seizures/stroke symptoms or increased confusion/speech difficulty or memory difficulties.

## 2022-05-06 NOTE — PROGRESS NOTES
I received a call from Dr. Dhaliwal's office, Grace, and she states  is okay with the patient getting an MRI of the head.  I have notified .

## 2022-05-06 NOTE — PROGRESS NOTES
Subjective   Amanda Bhandair, 1955, is a female who is being seen today for   Chief Complaint   Patient presents with   • Memory Loss       HISTORY OF PRESENT ILLNESS: New patient work-up.  Patient seen for memory difficulty.  Patient months before her scheduled knee surgery had COVID infection with fever, headache, passing out spells sweats and slept in the bed for days tired with decreased smell and taste.  Patient was having some memory difficulties at that time and then had her knee surgery on 3/2/2022 and after that had COVID-pneumonia.  Patient was seen in the ER in [facility] where she was given lorazepam for anxiety and antibiotic Zithromax.  She was having severe balance difficulties running into walls shortness of breath sleep restless problems and trazodone was started which she had significant side effects from it and ended up changing to Remeron this past Wednesday.  She is also on Wellbutrin.  Patient for reasons unknown had her GFR dropped to 30 which has improved into the 40s in the last blood work seen.  Patient still having some memory difficulty.  3 weeks ago she had her sister tell her that she thought she had had a stroke when she could not finish sentences.  Patient had some tremors.  Patient ended up having a CT head recently done and showed basal ganglia calcifications but no other acute process and carotids normal.  I wanted to do an MRI of the brain as soon as possible and we have gotten permission to do that per Dr. Dhaliwal.  However if she is planning on going out of town tomorrow and will let us know when she is coming back.  Patient is to get further blood work done today.  CBC showed anemia.  Hormone testing was done to be followed by PCP.  No recent hemoglobin A1c done.  Patient has B12 deficiency and now on B12 supplement.  Patient has low normal folate.  Patient says she is 80% better than she was 2 weeks ago.  She still had some confusion today about the time of her  appointment in which way to get to the appointment.    REVIEW OF SYSTEMS:   GENERAL: Blood pressure 144/88 sitting 148/90 standing pulse 74.  PULMONARY: No history of sleep apnea  CVS: No chest pain or palpitation  GASTROINTESTINAL: No acute GI distress  GENITOURINARY: As above  GYN: No acute GYN distress  MUSCULOSKELETAL: As above  HEENT: As above  ENDOCRINE: Patient was told she was prediabetic PCP.  PSYCHIATRIC: Patient has had anxiety/mood disorder  HEMATOLOGY: As above  SKIN: No acute skin changes   Family history reviewed and otherwise noncontributory  Social history: Patient denies smoking or drug or alcohol use    PHYSICAL EXAMINATION:    GENERAL: No acute distress  CRANIUM: Normocephalic/atraumatic patient denies headaches  HEENT:       EYES: EOMs intact without nystagmus and fields full to confrontation.  Pupils equal round reactive to light.  No acute fundic abnormalities.       EARS: Tympanic membranes normal and hears tuning fork bilaterally but has chronic hearing loss and tinnitus       THROAT: No acute oropharynx abnormalities no swallowing difficulties by history       NECK: No acute cardiopulmonary abnormalities by auscultation  CHEST: No acute cardiopulmonary abnormalities by auscultation  ABDOMEN: Nondistended  EXTREMITIES: Dorsalis pedis pulse symmetrical  NEURO: Patient alert and follows commands without difficulty  SPEECH: MMSE is 30 of 30    CRANIAL NERVES: Motor/sensory about the face normal and symmetric    MOTOR STRENGTH: Motor strength upper and lower extremities normal  STATION AND GAIT: Gait favors the left lower extremity because of the knee.  Patient having some difficulty with tandem walk of uncertain etiology.  Romberg negative  CEREBELLAR: Finger-nose so some dysmetria and heel-to-shin some possible dysmetria as well.  Hard to assess the left lower extremity secondary to recent knee surgery  SENSORY: Decreased pin and vibration distal to proximal in the lower extremities to midfoot  bilaterally in upper extremities to the mid palm bilaterally  REFLEXES: Reflexes are present symmetric upper and lower extremity without clonus or Babinski      ASSESSMENT AND PLAN: Patient with a history of post COVID memory difficulties and is getting formal memory testing.  I like to try to get baseline MRI of the brain and she is to let us know when she is going to be back in town so we can get that scheduled.  Patient to get baseline blood work.  Safety and driving precautions reviewed in detail.  I spent 45 minutes with this patient with counseling exam and review of records.  Patient to get with PCP evaluate diet control about abnormalities in blood work.  Patient to get back to the emergency room if seizure or stroke symptoms / encephalopathic symptoms occur.      Diagnoses and all orders for this visit:    1. Memory difficulty (Primary)    2. Imbalance        Dictated utilizing Dragon voice recognition software

## 2022-06-14 ENCOUNTER — TELEPHONE (OUTPATIENT)
Dept: NEUROLOGY | Facility: OTHER | Age: 67
End: 2022-06-14

## 2022-11-03 ENCOUNTER — HOSPITAL ENCOUNTER (OUTPATIENT)
Facility: HOSPITAL | Age: 67
Setting detail: SURGERY ADMIT
End: 2022-11-03
Attending: ORTHOPAEDIC SURGERY | Admitting: ORTHOPAEDIC SURGERY

## 2022-11-16 ENCOUNTER — PRE-ADMISSION TESTING (OUTPATIENT)
Dept: PREADMISSION TESTING | Facility: HOSPITAL | Age: 67
End: 2022-11-16

## 2022-11-16 ENCOUNTER — HOSPITAL ENCOUNTER (OUTPATIENT)
Dept: GENERAL RADIOLOGY | Facility: HOSPITAL | Age: 67
Discharge: HOME | End: 2022-11-16

## 2022-11-16 VITALS
HEART RATE: 84 BPM | RESPIRATION RATE: 16 BRPM | WEIGHT: 183.2 LBS | BODY MASS INDEX: 33.71 KG/M2 | OXYGEN SATURATION: 99 % | HEIGHT: 62 IN | DIASTOLIC BLOOD PRESSURE: 85 MMHG | SYSTOLIC BLOOD PRESSURE: 158 MMHG

## 2022-11-16 LAB
ALBUMIN SERPL-MCNC: 4.2 G/DL (ref 3.5–5.2)
ALBUMIN/GLOB SERPL: 1.8 G/DL
ALP SERPL-CCNC: 109 U/L (ref 39–117)
ALT SERPL W P-5'-P-CCNC: 10 U/L (ref 1–33)
ANION GAP SERPL CALCULATED.3IONS-SCNC: 7 MMOL/L (ref 5–15)
APTT PPP: 27.4 SECONDS (ref 24.1–35)
AST SERPL-CCNC: 14 U/L (ref 1–32)
BACTERIA UR QL AUTO: ABNORMAL /HPF
BILIRUB SERPL-MCNC: 0.3 MG/DL (ref 0–1.2)
BILIRUB UR QL STRIP: NEGATIVE
BUN SERPL-MCNC: 14 MG/DL (ref 8–23)
BUN/CREAT SERPL: 12.6 (ref 7–25)
CALCIUM SPEC-SCNC: 9.4 MG/DL (ref 8.6–10.5)
CHLORIDE SERPL-SCNC: 106 MMOL/L (ref 98–107)
CLARITY UR: CLEAR
CO2 SERPL-SCNC: 27 MMOL/L (ref 22–29)
COLOR UR: YELLOW
CREAT SERPL-MCNC: 1.11 MG/DL (ref 0.57–1)
DEPRECATED RDW RBC AUTO: 45.5 FL (ref 37–54)
EGFRCR SERPLBLD CKD-EPI 2021: 54.9 ML/MIN/1.73
ERYTHROCYTE [DISTWIDTH] IN BLOOD BY AUTOMATED COUNT: 12.8 % (ref 12.3–15.4)
GLOBULIN UR ELPH-MCNC: 2.4 GM/DL
GLUCOSE SERPL-MCNC: 92 MG/DL (ref 65–99)
GLUCOSE UR STRIP-MCNC: NEGATIVE MG/DL
HCT VFR BLD AUTO: 37.5 % (ref 34–46.6)
HGB BLD-MCNC: 11.4 G/DL (ref 12–15.9)
HGB UR QL STRIP.AUTO: ABNORMAL
HYALINE CASTS UR QL AUTO: ABNORMAL /LPF
INR PPP: 0.97 (ref 0.91–1.09)
KETONES UR QL STRIP: NEGATIVE
LEUKOCYTE ESTERASE UR QL STRIP.AUTO: NEGATIVE
MCH RBC QN AUTO: 29 PG (ref 26.6–33)
MCHC RBC AUTO-ENTMCNC: 30.4 G/DL (ref 31.5–35.7)
MCV RBC AUTO: 95.4 FL (ref 79–97)
NITRITE UR QL STRIP: NEGATIVE
PH UR STRIP.AUTO: 5.5 [PH] (ref 5–8)
PLATELET # BLD AUTO: 305 10*3/MM3 (ref 140–450)
PMV BLD AUTO: 9.8 FL (ref 6–12)
POTASSIUM SERPL-SCNC: 4.2 MMOL/L (ref 3.5–5.2)
PROT SERPL-MCNC: 6.6 G/DL (ref 6–8.5)
PROT UR QL STRIP: NEGATIVE
PROTHROMBIN TIME: 12.5 SECONDS (ref 11.9–14.6)
RBC # BLD AUTO: 3.93 10*6/MM3 (ref 3.77–5.28)
RBC # UR STRIP: ABNORMAL /HPF
REF LAB TEST METHOD: ABNORMAL
SODIUM SERPL-SCNC: 140 MMOL/L (ref 136–145)
SP GR UR STRIP: 1.02 (ref 1–1.03)
SQUAMOUS #/AREA URNS HPF: ABNORMAL /HPF
UROBILINOGEN UR QL STRIP: ABNORMAL
WBC # UR STRIP: ABNORMAL /HPF
WBC NRBC COR # BLD: 6.09 10*3/MM3 (ref 3.4–10.8)

## 2022-11-16 PROCEDURE — 85610 PROTHROMBIN TIME: CPT

## 2022-11-16 PROCEDURE — 85027 COMPLETE CBC AUTOMATED: CPT

## 2022-11-16 PROCEDURE — 71045 X-RAY EXAM CHEST 1 VIEW: CPT

## 2022-11-16 PROCEDURE — 80053 COMPREHEN METABOLIC PANEL: CPT

## 2022-11-16 PROCEDURE — 81001 URINALYSIS AUTO W/SCOPE: CPT

## 2022-11-16 PROCEDURE — 85730 THROMBOPLASTIN TIME PARTIAL: CPT

## 2022-11-16 PROCEDURE — 93005 ELECTROCARDIOGRAM TRACING: CPT

## 2022-11-16 PROCEDURE — 36415 COLL VENOUS BLD VENIPUNCTURE: CPT

## 2022-11-16 PROCEDURE — 93010 ELECTROCARDIOGRAM REPORT: CPT | Performed by: INTERNAL MEDICINE

## 2022-11-16 RX ORDER — TRAZODONE HYDROCHLORIDE 50 MG/1
50 TABLET ORAL NIGHTLY
COMMUNITY

## 2022-11-16 RX ORDER — ZOLPIDEM TARTRATE 10 MG/1
10 TABLET ORAL NIGHTLY
COMMUNITY

## 2022-11-19 LAB
QT INTERVAL: 392 MS
QTC INTERVAL: 431 MS

## 2022-11-22 ENCOUNTER — APPOINTMENT (OUTPATIENT)
Dept: PREADMISSION TESTING | Facility: HOSPITAL | Age: 67
End: 2022-11-22

## 2022-12-05 ENCOUNTER — TELEPHONE (OUTPATIENT)
Dept: VASCULAR SURGERY | Facility: CLINIC | Age: 67
End: 2022-12-05

## 2022-12-06 ENCOUNTER — OFFICE VISIT (OUTPATIENT)
Dept: VASCULAR SURGERY | Facility: CLINIC | Age: 67
End: 2022-12-06

## 2022-12-06 VITALS
DIASTOLIC BLOOD PRESSURE: 80 MMHG | HEIGHT: 62 IN | SYSTOLIC BLOOD PRESSURE: 124 MMHG | OXYGEN SATURATION: 96 % | WEIGHT: 183 LBS | HEART RATE: 100 BPM | BODY MASS INDEX: 33.68 KG/M2

## 2022-12-06 DIAGNOSIS — G89.29 CHRONIC MIDLINE LOW BACK PAIN, UNSPECIFIED WHETHER SCIATICA PRESENT: Primary | ICD-10-CM

## 2022-12-06 DIAGNOSIS — M54.50 CHRONIC MIDLINE LOW BACK PAIN, UNSPECIFIED WHETHER SCIATICA PRESENT: Primary | ICD-10-CM

## 2022-12-06 PROBLEM — I10 HYPERTENSION: Status: ACTIVE | Noted: 2022-12-06

## 2022-12-06 PROCEDURE — 99204 OFFICE O/P NEW MOD 45 MIN: CPT | Performed by: SURGERY

## 2022-12-06 NOTE — PROGRESS NOTES
2022      MIGUELANGEL Malin MD  [address] [place] [id number]    Amanda Bhandari  1955    Chief Complaint   Patient presents with   • JUDY MALIN (22)       Dear MIGUELANGEL Malin MD:      HPI  I had the pleasure of seeing your patient Amanda Bhandari in the office today.  Thank you kindly for this consultation.  As you recall, Amanda Bhandari is a 67 y.o.  female who you are currently following for chronic back pain.  Amanda Nobles scheduled for anterior lumbar interbody fusion of L5-S1 with Dr. Malin on 22.  She has undergone previous abdominal surgeries including, tubal pregnancy, hysterectomy, and exploratory laparotomy.  She has history of PE in , no evidence of DVT.    Past Medical History:   Diagnosis Date   • Arthritis    • Disease of thyroid gland    • History of transfusion 1977 tubal pregnancy ruptured   • Hypertension    • Hypothyroidism    • Pulmonary embolism (HCC)        Past Surgical History:   Procedure Laterality Date   • ABLATION OF DYSRHYTHMIC FOCUS      Pre Daugherty's   • APPENDECTOMY     • DIAGNOSTIC LAPAROSCOPY EXPLORATORY LAPAROTOMY N/A     FROM HORSE INJURY   • FRACTURE SURGERY Left     ANKLE, PLATE PLACED   • HYSTERECTOMY     • JOINT REPLACEMENT Bilateral    • SHOULDER SURGERY Left    • WRIST SURGERY         Family History   Problem Relation Age of Onset   • Cancer Mother         Ovarian   • Heart attack Mother    • Cancer Father         Esophageal cancer, prostate cancer       Social History     Socioeconomic History   • Marital status:    Tobacco Use   • Smoking status: Former     Packs/day: 1.50     Years: 15.00     Pack years: 22.50     Types: Cigarettes     Quit date: 2014     Years since quittin.9   • Smokeless tobacco: Never   Vaping Use   • Vaping Use: Never used   Substance and Sexual Activity   • Alcohol use: Not Currently     Comment: VERY SELDOM   • Drug use: Never   • Sexual activity: Not Currently      "Partners: Male     Birth control/protection: Hysterectomy       Allergies   Allergen Reactions   • Percocet [Oxycodone-Acetaminophen] Itching     PT STATES DR. SENIOR GAVE HER AN RX FOR IT AND TOLD HER TO TAKE BENADRYL WITH IT.  PT STATES NO PROBLEMS WITH ITCHING WHEN SHE DOES THIS       Current Outpatient Medications   Medication Instructions   • buPROPion XL (WELLBUTRIN XL) 150 mg, Oral, Daily   • levothyroxine (SYNTHROID, LEVOTHROID) 25 mcg, Oral, Daily   • mirtazapine (REMERON) 15 mg, Oral, Nightly   • traZODone (DESYREL) 100 mg, Oral, Nightly   • zolpidem (AMBIEN) 10 mg, Oral, Nightly       Review of Systems   Constitutional: Negative.    HENT: Negative.    Eyes: Negative.    Respiratory: Negative.    Cardiovascular: Negative.    Gastrointestinal: Negative.    Endocrine: Negative.    Genitourinary: Negative.    Musculoskeletal: Positive for back pain and gait problem.   Skin: Negative.    Allergic/Immunologic: Negative.    Hematological: Negative.    Psychiatric/Behavioral: Negative.    All other systems reviewed and are negative.      /80   Pulse 100   Ht 157.5 cm (62\")   Wt 83 kg (183 lb)   SpO2 96%   BMI 33.47 kg/m²   Physical Exam  Vitals and nursing note reviewed.   Constitutional:       Appearance: She is well-developed.   HENT:      Head: Normocephalic and atraumatic.   Eyes:      General: No scleral icterus.     Pupils: Pupils are equal, round, and reactive to light.   Neck:      Thyroid: No thyromegaly.      Vascular: No carotid bruit or JVD.   Cardiovascular:      Rate and Rhythm: Normal rate and regular rhythm.      Pulses: Normal pulses.      Heart sounds: Normal heart sounds.   Pulmonary:      Effort: Pulmonary effort is normal.      Breath sounds: Normal breath sounds.   Abdominal:      General: Bowel sounds are normal. There is no distension or abdominal bruit.      Palpations: Abdomen is soft. There is no mass.      Tenderness: There is no abdominal tenderness.   Musculoskeletal:    "      General: Normal range of motion.      Cervical back: Neck supple.      Comments: Lumbar pain   Lymphadenopathy:      Cervical: No cervical adenopathy.   Skin:     General: Skin is warm and dry.   Neurological:      Mental Status: She is alert and oriented to person, place, and time.      Cranial Nerves: No cranial nerve deficit.      Sensory: No sensory deficit.         XR Chest 1 View    Result Date: 11/16/2022  Narrative: EXAM/TECHNIQUE: XR CHEST 1 VW-  INDICATION: Preanesthesia testing  COMPARISON: None available.  FINDINGS:  Cardiomediastinal silhouette is within normal limits. No pleural effusion or pneumothorax. No focal consolidation. No acute osseous findings.      Impression:  No acute findings. This report was finalized on 11/16/2022 10:54 by Dr. Eris Morelos MD.      There is no problem list on file for this patient.        ICD-10-CM ICD-9-CM   1. Chronic midline low back pain, unspecified whether sciatica present  M54.50 724.2    G89.29 338.29           Plan: After thoroughly evaluating Amanda Bhandari, I believe the best course of action is to proceed with anterior lumbar interbody fusion of L5-S1.  Risks of ALIF were discussed and include, but are not limited to, bleeding, infection, nerve damage, vessel damage, retrograde ejaculation, bowel damage, ureteral damage, DVT, MI, stroke, and death.  The patient understands these risks and wishes to proceed with procedure.  The patient can continue taking their current medication regimen as previously planned.  This was all discussed in full with complete understanding.    Thank you for allowing me to participate in the care of your patient.  Please do not hesitate with any questions or concerns.  I will keep you aware of any further encounters with Amanda Bhandari.        Sincerely yours,         Fritz Mims, DO

## 2022-12-07 ENCOUNTER — ANESTHESIA EVENT (OUTPATIENT)
Dept: PERIOP | Facility: HOSPITAL | Age: 67
End: 2022-12-07

## 2022-12-07 ENCOUNTER — APPOINTMENT (OUTPATIENT)
Dept: GENERAL RADIOLOGY | Facility: HOSPITAL | Age: 67
End: 2022-12-07

## 2022-12-07 ENCOUNTER — HOSPITAL ENCOUNTER (INPATIENT)
Facility: HOSPITAL | Age: 67
LOS: 4 days | Discharge: HOME | End: 2022-12-11
Attending: ORTHOPAEDIC SURGERY | Admitting: ORTHOPAEDIC SURGERY

## 2022-12-07 ENCOUNTER — ANESTHESIA (OUTPATIENT)
Dept: PERIOP | Facility: HOSPITAL | Age: 67
End: 2022-12-07

## 2022-12-07 DIAGNOSIS — Z74.09 IMPAIRED MOBILITY: ICD-10-CM

## 2022-12-07 DIAGNOSIS — M48.061 SPINAL STENOSIS OF LUMBAR REGION WITHOUT NEUROGENIC CLAUDICATION: Primary | ICD-10-CM

## 2022-12-07 DIAGNOSIS — Z78.9 DEFICITS IN ACTIVITIES OF DAILY LIVING: ICD-10-CM

## 2022-12-07 PROBLEM — E03.9 HYPOTHYROIDISM (ACQUIRED): Chronic | Status: ACTIVE | Noted: 2022-12-07

## 2022-12-07 PROBLEM — K59.03 DRUG INDUCED CONSTIPATION: Status: ACTIVE | Noted: 2022-12-07

## 2022-12-07 LAB
ABO GROUP BLD: NORMAL
BLD GP AB SCN SERPL QL: NEGATIVE
RH BLD: POSITIVE
T&S EXPIRATION DATE: NORMAL

## 2022-12-07 PROCEDURE — 25010000002 HYDROMORPHONE PER 4 MG: Performed by: ANESTHESIOLOGY

## 2022-12-07 PROCEDURE — 74018 RADEX ABDOMEN 1 VIEW: CPT

## 2022-12-07 PROCEDURE — 25010000002 CEFAZOLIN PER 500 MG: Performed by: ORTHOPAEDIC SURGERY

## 2022-12-07 PROCEDURE — 25010000002 CEFAZOLIN 1-4 GM/50ML-% SOLUTION: Performed by: ORTHOPAEDIC SURGERY

## 2022-12-07 PROCEDURE — C1713 ANCHOR/SCREW BN/BN,TIS/BN: HCPCS | Performed by: ORTHOPAEDIC SURGERY

## 2022-12-07 PROCEDURE — 4A11X4G MONITORING OF PERIPHERAL NERVOUS ELECTRICAL ACTIVITY, INTRAOPERATIVE, EXTERNAL APPROACH: ICD-10-PCS | Performed by: ORTHOPAEDIC SURGERY

## 2022-12-07 PROCEDURE — 01NB0ZZ RELEASE LUMBAR NERVE, OPEN APPROACH: ICD-10-PCS | Performed by: ORTHOPAEDIC SURGERY

## 2022-12-07 PROCEDURE — 0WJH0ZZ INSPECTION OF RETROPERITONEUM, OPEN APPROACH: ICD-10-PCS | Performed by: SURGERY

## 2022-12-07 PROCEDURE — 25010000002 PROPOFOL 10 MG/ML EMULSION: Performed by: NURSE ANESTHETIST, CERTIFIED REGISTERED

## 2022-12-07 PROCEDURE — 0SB40ZZ EXCISION OF LUMBOSACRAL DISC, OPEN APPROACH: ICD-10-PCS | Performed by: ORTHOPAEDIC SURGERY

## 2022-12-07 PROCEDURE — 22558 ARTHRD ANT NTRBD MIN DSC LUM: CPT | Performed by: SURGERY

## 2022-12-07 PROCEDURE — 25010000002 DROPERIDOL PER 5 MG: Performed by: ANESTHESIOLOGY

## 2022-12-07 PROCEDURE — 25010000002 FENTANYL CITRATE (PF) 250 MCG/5ML SOLUTION: Performed by: NURSE ANESTHETIST, CERTIFIED REGISTERED

## 2022-12-07 PROCEDURE — 86850 RBC ANTIBODY SCREEN: CPT | Performed by: ORTHOPAEDIC SURGERY

## 2022-12-07 PROCEDURE — 72100 X-RAY EXAM L-S SPINE 2/3 VWS: CPT

## 2022-12-07 PROCEDURE — 86900 BLOOD TYPING SEROLOGIC ABO: CPT | Performed by: ORTHOPAEDIC SURGERY

## 2022-12-07 PROCEDURE — 0SG30A0 FUSION OF LUMBOSACRAL JOINT WITH INTERBODY FUSION DEVICE, ANTERIOR APPROACH, ANTERIOR COLUMN, OPEN APPROACH: ICD-10-PCS | Performed by: ORTHOPAEDIC SURGERY

## 2022-12-07 PROCEDURE — 0 HYDROMORPHONE 1 MG/ML SOLUTION: Performed by: ORTHOPAEDIC SURGERY

## 2022-12-07 PROCEDURE — 86901 BLOOD TYPING SEROLOGIC RH(D): CPT | Performed by: ORTHOPAEDIC SURGERY

## 2022-12-07 PROCEDURE — 76000 FLUOROSCOPY <1 HR PHYS/QHP: CPT

## 2022-12-07 PROCEDURE — 25010000002 FENTANYL CITRATE (PF) 50 MCG/ML SOLUTION: Performed by: ANESTHESIOLOGY

## 2022-12-07 DEVICE — PUTTY GRFT BONE CATALYST NANOSYNTHETIC 10CC: Type: IMPLANTABLE DEVICE | Site: SPINE LUMBAR | Status: FUNCTIONAL

## 2022-12-07 DEVICE — ALIF SCREW, 6.0MM X 30MM
Type: IMPLANTABLE DEVICE | Site: SPINE LUMBAR | Status: FUNCTIONAL
Brand: ASPIDA

## 2022-12-07 DEVICE — HEMOST ABS SURGIFOAM SZ100 8X12 10MM: Type: IMPLANTABLE DEVICE | Site: SPINE LUMBAR | Status: FUNCTIONAL

## 2022-12-07 DEVICE — KT HEMOST ABS SURGIFOAM PORCN 1GRAM: Type: IMPLANTABLE DEVICE | Site: SPINE LUMBAR | Status: FUNCTIONAL

## 2022-12-07 DEVICE — SCRW BRIGADE XLRF 4.5X25MM: Type: IMPLANTABLE DEVICE | Site: SPINE LUMBAR | Status: FUNCTIONAL

## 2022-12-07 DEVICE — LIGACLIP MCA MULTIPLE CLIP APPLIERS, 20 SMALL CLIPS
Type: IMPLANTABLE DEVICE | Site: SPINE LUMBAR | Status: FUNCTIONAL
Brand: LIGACLIP

## 2022-12-07 DEVICE — 16MM SACRAL PLATE
Type: IMPLANTABLE DEVICE | Site: SPINE LUMBAR | Status: FUNCTIONAL
Brand: ASPIDA

## 2022-12-07 DEVICE — LIGACLIP MCA MULTIPLE CLIP APPLIERS, 30 MEDIUM CLIPS
Type: IMPLANTABLE DEVICE | Site: SPINE LUMBAR | Status: FUNCTIONAL
Brand: LIGACLIP

## 2022-12-07 DEVICE — IMPLANTABLE DEVICE: Type: IMPLANTABLE DEVICE | Site: SPINE LUMBAR | Status: FUNCTIONAL

## 2022-12-07 RX ORDER — SODIUM CHLORIDE 0.9 % (FLUSH) 0.9 %
10 SYRINGE (ML) INJECTION AS NEEDED
Status: DISCONTINUED | OUTPATIENT
Start: 2022-12-07 | End: 2022-12-11 | Stop reason: HOSPADM

## 2022-12-07 RX ORDER — SUCCINYLCHOLINE/SOD CL,ISO/PF 200MG/10ML
SYRINGE (ML) INTRAVENOUS AS NEEDED
Status: DISCONTINUED | OUTPATIENT
Start: 2022-12-07 | End: 2022-12-07 | Stop reason: SURG

## 2022-12-07 RX ORDER — SODIUM CHLORIDE 0.9 % (FLUSH) 0.9 %
10 SYRINGE (ML) INJECTION AS NEEDED
Status: DISCONTINUED | OUTPATIENT
Start: 2022-12-07 | End: 2022-12-07 | Stop reason: HOSPADM

## 2022-12-07 RX ORDER — POLYETHYLENE GLYCOL 3350 17 G/17G
17 POWDER, FOR SOLUTION ORAL 3 TIMES DAILY
Status: DISCONTINUED | OUTPATIENT
Start: 2022-12-07 | End: 2022-12-11 | Stop reason: HOSPADM

## 2022-12-07 RX ORDER — LIDOCAINE HYDROCHLORIDE 10 MG/ML
0.5 INJECTION, SOLUTION EPIDURAL; INFILTRATION; INTRACAUDAL; PERINEURAL ONCE AS NEEDED
Status: DISCONTINUED | OUTPATIENT
Start: 2022-12-07 | End: 2022-12-07 | Stop reason: HOSPADM

## 2022-12-07 RX ORDER — MIDAZOLAM HYDROCHLORIDE 1 MG/ML
0.5 INJECTION INTRAMUSCULAR; INTRAVENOUS
Status: DISCONTINUED | OUTPATIENT
Start: 2022-12-07 | End: 2022-12-07 | Stop reason: HOSPADM

## 2022-12-07 RX ORDER — LABETALOL HYDROCHLORIDE 5 MG/ML
5 INJECTION, SOLUTION INTRAVENOUS
Status: DISCONTINUED | OUTPATIENT
Start: 2022-12-07 | End: 2022-12-07 | Stop reason: HOSPADM

## 2022-12-07 RX ORDER — DROPERIDOL 2.5 MG/ML
0.62 INJECTION, SOLUTION INTRAMUSCULAR; INTRAVENOUS ONCE AS NEEDED
Status: COMPLETED | OUTPATIENT
Start: 2022-12-07 | End: 2022-12-07

## 2022-12-07 RX ORDER — SODIUM CHLORIDE 9 MG/ML
40 INJECTION, SOLUTION INTRAVENOUS AS NEEDED
Status: DISCONTINUED | OUTPATIENT
Start: 2022-12-07 | End: 2022-12-07 | Stop reason: HOSPADM

## 2022-12-07 RX ORDER — ROCURONIUM BROMIDE 10 MG/ML
INJECTION, SOLUTION INTRAVENOUS AS NEEDED
Status: DISCONTINUED | OUTPATIENT
Start: 2022-12-07 | End: 2022-12-07 | Stop reason: SURG

## 2022-12-07 RX ORDER — LEVOTHYROXINE SODIUM 0.03 MG/1
25 TABLET ORAL
Status: DISCONTINUED | OUTPATIENT
Start: 2022-12-07 | End: 2022-12-11 | Stop reason: HOSPADM

## 2022-12-07 RX ORDER — PROPOFOL 10 MG/ML
VIAL (ML) INTRAVENOUS AS NEEDED
Status: DISCONTINUED | OUTPATIENT
Start: 2022-12-07 | End: 2022-12-07 | Stop reason: SURG

## 2022-12-07 RX ORDER — FAMOTIDINE 10 MG/ML
20 INJECTION, SOLUTION INTRAVENOUS EVERY 12 HOURS SCHEDULED
Status: DISCONTINUED | OUTPATIENT
Start: 2022-12-07 | End: 2022-12-11 | Stop reason: HOSPADM

## 2022-12-07 RX ORDER — SODIUM CHLORIDE 9 MG/ML
75 INJECTION, SOLUTION INTRAVENOUS CONTINUOUS
Status: DISCONTINUED | OUTPATIENT
Start: 2022-12-07 | End: 2022-12-08

## 2022-12-07 RX ORDER — SODIUM CHLORIDE, SODIUM LACTATE, POTASSIUM CHLORIDE, CALCIUM CHLORIDE 600; 310; 30; 20 MG/100ML; MG/100ML; MG/100ML; MG/100ML
100 INJECTION, SOLUTION INTRAVENOUS CONTINUOUS
Status: DISCONTINUED | OUTPATIENT
Start: 2022-12-07 | End: 2022-12-08

## 2022-12-07 RX ORDER — FAMOTIDINE 20 MG/1
20 TABLET, FILM COATED ORAL EVERY 12 HOURS SCHEDULED
Status: DISCONTINUED | OUTPATIENT
Start: 2022-12-07 | End: 2022-12-11 | Stop reason: HOSPADM

## 2022-12-07 RX ORDER — SODIUM CHLORIDE, SODIUM LACTATE, POTASSIUM CHLORIDE, CALCIUM CHLORIDE 600; 310; 30; 20 MG/100ML; MG/100ML; MG/100ML; MG/100ML
1000 INJECTION, SOLUTION INTRAVENOUS CONTINUOUS
Status: DISCONTINUED | OUTPATIENT
Start: 2022-12-07 | End: 2022-12-07 | Stop reason: SDUPTHER

## 2022-12-07 RX ORDER — LIDOCAINE HYDROCHLORIDE 20 MG/ML
INJECTION, SOLUTION EPIDURAL; INFILTRATION; INTRACAUDAL; PERINEURAL AS NEEDED
Status: DISCONTINUED | OUTPATIENT
Start: 2022-12-07 | End: 2022-12-07 | Stop reason: SURG

## 2022-12-07 RX ORDER — HYDROCODONE BITARTRATE AND ACETAMINOPHEN 10; 325 MG/1; MG/1
1 TABLET ORAL EVERY 4 HOURS PRN
Status: DISCONTINUED | OUTPATIENT
Start: 2022-12-07 | End: 2022-12-11 | Stop reason: HOSPADM

## 2022-12-07 RX ORDER — SODIUM CHLORIDE, SODIUM LACTATE, POTASSIUM CHLORIDE, CALCIUM CHLORIDE 600; 310; 30; 20 MG/100ML; MG/100ML; MG/100ML; MG/100ML
1000 INJECTION, SOLUTION INTRAVENOUS CONTINUOUS
Status: DISCONTINUED | OUTPATIENT
Start: 2022-12-07 | End: 2022-12-08

## 2022-12-07 RX ORDER — SODIUM CHLORIDE 9 MG/ML
40 INJECTION, SOLUTION INTRAVENOUS AS NEEDED
Status: DISCONTINUED | OUTPATIENT
Start: 2022-12-07 | End: 2022-12-11 | Stop reason: HOSPADM

## 2022-12-07 RX ORDER — MAGNESIUM HYDROXIDE 1200 MG/15ML
LIQUID ORAL AS NEEDED
Status: DISCONTINUED | OUTPATIENT
Start: 2022-12-07 | End: 2022-12-07 | Stop reason: HOSPADM

## 2022-12-07 RX ORDER — TIZANIDINE 4 MG/1
4 TABLET ORAL EVERY 8 HOURS PRN
Status: DISCONTINUED | OUTPATIENT
Start: 2022-12-07 | End: 2022-12-11 | Stop reason: HOSPADM

## 2022-12-07 RX ORDER — HYDROMORPHONE HYDROCHLORIDE 1 MG/ML
0.5 INJECTION, SOLUTION INTRAMUSCULAR; INTRAVENOUS; SUBCUTANEOUS
Status: DISCONTINUED | OUTPATIENT
Start: 2022-12-07 | End: 2022-12-07 | Stop reason: HOSPADM

## 2022-12-07 RX ORDER — MIRTAZAPINE 15 MG/1
15 TABLET, FILM COATED ORAL NIGHTLY
Status: DISCONTINUED | OUTPATIENT
Start: 2022-12-07 | End: 2022-12-10

## 2022-12-07 RX ORDER — ONDANSETRON 2 MG/ML
4 INJECTION INTRAMUSCULAR; INTRAVENOUS ONCE AS NEEDED
Status: DISCONTINUED | OUTPATIENT
Start: 2022-12-07 | End: 2022-12-07 | Stop reason: HOSPADM

## 2022-12-07 RX ORDER — ACETAMINOPHEN 325 MG/1
650 TABLET ORAL EVERY 6 HOURS PRN
Status: DISCONTINUED | OUTPATIENT
Start: 2022-12-07 | End: 2022-12-11 | Stop reason: HOSPADM

## 2022-12-07 RX ORDER — SODIUM CHLORIDE 0.9 % (FLUSH) 0.9 %
3-10 SYRINGE (ML) INJECTION AS NEEDED
Status: DISCONTINUED | OUTPATIENT
Start: 2022-12-07 | End: 2022-12-07 | Stop reason: HOSPADM

## 2022-12-07 RX ORDER — AMOXICILLIN 250 MG
1 CAPSULE ORAL 2 TIMES DAILY
Status: DISCONTINUED | OUTPATIENT
Start: 2022-12-07 | End: 2022-12-11 | Stop reason: HOSPADM

## 2022-12-07 RX ORDER — IBUPROFEN 600 MG/1
600 TABLET ORAL ONCE AS NEEDED
Status: DISCONTINUED | OUTPATIENT
Start: 2022-12-07 | End: 2022-12-07 | Stop reason: HOSPADM

## 2022-12-07 RX ORDER — SODIUM CHLORIDE 0.9 % (FLUSH) 0.9 %
3 SYRINGE (ML) INJECTION EVERY 12 HOURS SCHEDULED
Status: DISCONTINUED | OUTPATIENT
Start: 2022-12-07 | End: 2022-12-11 | Stop reason: HOSPADM

## 2022-12-07 RX ORDER — SODIUM CHLORIDE 9 MG/ML
INJECTION INTRAVENOUS AS NEEDED
Status: DISCONTINUED | OUTPATIENT
Start: 2022-12-07 | End: 2022-12-07 | Stop reason: HOSPADM

## 2022-12-07 RX ORDER — NALOXONE HCL 0.4 MG/ML
0.4 VIAL (ML) INJECTION AS NEEDED
Status: DISCONTINUED | OUTPATIENT
Start: 2022-12-07 | End: 2022-12-07 | Stop reason: HOSPADM

## 2022-12-07 RX ORDER — ZOLPIDEM TARTRATE 5 MG/1
10 TABLET ORAL NIGHTLY
Status: DISCONTINUED | OUTPATIENT
Start: 2022-12-07 | End: 2022-12-11 | Stop reason: HOSPADM

## 2022-12-07 RX ORDER — HYDROCODONE BITARTRATE AND ACETAMINOPHEN 10; 325 MG/1; MG/1
1 TABLET ORAL ONCE AS NEEDED
Status: DISCONTINUED | OUTPATIENT
Start: 2022-12-07 | End: 2022-12-07 | Stop reason: HOSPADM

## 2022-12-07 RX ORDER — FLUMAZENIL 0.1 MG/ML
0.2 INJECTION INTRAVENOUS AS NEEDED
Status: DISCONTINUED | OUTPATIENT
Start: 2022-12-07 | End: 2022-12-07 | Stop reason: HOSPADM

## 2022-12-07 RX ORDER — CEFAZOLIN SODIUM 1 G/50ML
1 INJECTION, SOLUTION INTRAVENOUS EVERY 8 HOURS
Status: COMPLETED | OUTPATIENT
Start: 2022-12-07 | End: 2022-12-08

## 2022-12-07 RX ORDER — NEOSTIGMINE METHYLSULFATE 5 MG/5 ML
SYRINGE (ML) INTRAVENOUS AS NEEDED
Status: DISCONTINUED | OUTPATIENT
Start: 2022-12-07 | End: 2022-12-07 | Stop reason: SURG

## 2022-12-07 RX ORDER — SODIUM CHLORIDE 0.9 % (FLUSH) 0.9 %
3 SYRINGE (ML) INJECTION AS NEEDED
Status: DISCONTINUED | OUTPATIENT
Start: 2022-12-07 | End: 2022-12-07 | Stop reason: HOSPADM

## 2022-12-07 RX ORDER — SODIUM CHLORIDE 0.9 % (FLUSH) 0.9 %
3 SYRINGE (ML) INJECTION EVERY 12 HOURS SCHEDULED
Status: DISCONTINUED | OUTPATIENT
Start: 2022-12-07 | End: 2022-12-07 | Stop reason: HOSPADM

## 2022-12-07 RX ORDER — FENTANYL CITRATE 50 UG/ML
INJECTION, SOLUTION INTRAMUSCULAR; INTRAVENOUS AS NEEDED
Status: DISCONTINUED | OUTPATIENT
Start: 2022-12-07 | End: 2022-12-07 | Stop reason: SURG

## 2022-12-07 RX ORDER — ONDANSETRON 2 MG/ML
4 INJECTION INTRAMUSCULAR; INTRAVENOUS EVERY 6 HOURS PRN
Status: DISCONTINUED | OUTPATIENT
Start: 2022-12-07 | End: 2022-12-07 | Stop reason: SDUPTHER

## 2022-12-07 RX ORDER — SODIUM CHLORIDE, SODIUM LACTATE, POTASSIUM CHLORIDE, CALCIUM CHLORIDE 600; 310; 30; 20 MG/100ML; MG/100ML; MG/100ML; MG/100ML
100 INJECTION, SOLUTION INTRAVENOUS CONTINUOUS
Status: DISCONTINUED | OUTPATIENT
Start: 2022-12-07 | End: 2022-12-07 | Stop reason: SDUPTHER

## 2022-12-07 RX ORDER — FENTANYL CITRATE 50 UG/ML
25 INJECTION, SOLUTION INTRAMUSCULAR; INTRAVENOUS
Status: DISCONTINUED | OUTPATIENT
Start: 2022-12-07 | End: 2022-12-07 | Stop reason: HOSPADM

## 2022-12-07 RX ORDER — ACETAMINOPHEN 500 MG
1000 TABLET ORAL ONCE
Status: COMPLETED | OUTPATIENT
Start: 2022-12-07 | End: 2022-12-07

## 2022-12-07 RX ORDER — HYDROCODONE BITARTRATE AND ACETAMINOPHEN 7.5; 325 MG/1; MG/1
2 TABLET ORAL EVERY 4 HOURS PRN
Status: DISCONTINUED | OUTPATIENT
Start: 2022-12-07 | End: 2022-12-07 | Stop reason: HOSPADM

## 2022-12-07 RX ORDER — ONDANSETRON 4 MG/1
4 TABLET, FILM COATED ORAL EVERY 6 HOURS PRN
Status: DISCONTINUED | OUTPATIENT
Start: 2022-12-07 | End: 2022-12-11 | Stop reason: HOSPADM

## 2022-12-07 RX ORDER — ONDANSETRON 2 MG/ML
4 INJECTION INTRAMUSCULAR; INTRAVENOUS EVERY 6 HOURS PRN
Status: DISCONTINUED | OUTPATIENT
Start: 2022-12-07 | End: 2022-12-11 | Stop reason: HOSPADM

## 2022-12-07 RX ORDER — TRAZODONE HYDROCHLORIDE 100 MG/1
100 TABLET ORAL NIGHTLY
Status: DISCONTINUED | OUTPATIENT
Start: 2022-12-07 | End: 2022-12-08

## 2022-12-07 RX ORDER — DIPHENHYDRAMINE HCL 25 MG
25 CAPSULE ORAL NIGHTLY PRN
Status: DISCONTINUED | OUTPATIENT
Start: 2022-12-07 | End: 2022-12-11 | Stop reason: HOSPADM

## 2022-12-07 RX ORDER — BUPIVACAINE HCL/0.9 % NACL/PF 0.1 %
2 PLASTIC BAG, INJECTION (ML) EPIDURAL ONCE
Status: COMPLETED | OUTPATIENT
Start: 2022-12-07 | End: 2022-12-07

## 2022-12-07 RX ORDER — BUPROPION HYDROCHLORIDE 150 MG/1
150 TABLET ORAL DAILY
Status: DISCONTINUED | OUTPATIENT
Start: 2022-12-07 | End: 2022-12-11 | Stop reason: HOSPADM

## 2022-12-07 RX ADMIN — Medication 2 G: at 07:29

## 2022-12-07 RX ADMIN — SODIUM CHLORIDE 75 ML/HR: 9 INJECTION, SOLUTION INTRAVENOUS at 13:30

## 2022-12-07 RX ADMIN — HYDROMORPHONE HYDROCHLORIDE 1 MG: 1 INJECTION, SOLUTION INTRAMUSCULAR; INTRAVENOUS; SUBCUTANEOUS at 14:44

## 2022-12-07 RX ADMIN — PROPOFOL 120 MG: 10 INJECTION, EMULSION INTRAVENOUS at 07:19

## 2022-12-07 RX ADMIN — CEFAZOLIN SODIUM 1 G: 1 INJECTION, SOLUTION INTRAVENOUS at 22:46

## 2022-12-07 RX ADMIN — HYDROMORPHONE HYDROCHLORIDE 0.5 MG: 1 INJECTION, SOLUTION INTRAMUSCULAR; INTRAVENOUS; SUBCUTANEOUS at 10:28

## 2022-12-07 RX ADMIN — Medication 10 ML: at 13:30

## 2022-12-07 RX ADMIN — Medication 120 MG: at 07:19

## 2022-12-07 RX ADMIN — LIDOCAINE HYDROCHLORIDE 100 MG: 20 INJECTION, SOLUTION EPIDURAL; INFILTRATION; INTRACAUDAL; PERINEURAL at 07:19

## 2022-12-07 RX ADMIN — SODIUM CHLORIDE, POTASSIUM CHLORIDE, SODIUM LACTATE AND CALCIUM CHLORIDE 1000 ML: 600; 310; 30; 20 INJECTION, SOLUTION INTRAVENOUS at 06:12

## 2022-12-07 RX ADMIN — FENTANYL CITRATE 25 MCG: 50 INJECTION INTRAMUSCULAR; INTRAVENOUS at 09:57

## 2022-12-07 RX ADMIN — TRAZODONE HYDROCHLORIDE 100 MG: 100 TABLET ORAL at 20:55

## 2022-12-07 RX ADMIN — SODIUM CHLORIDE, POTASSIUM CHLORIDE, SODIUM LACTATE AND CALCIUM CHLORIDE 1000 ML: 600; 310; 30; 20 INJECTION, SOLUTION INTRAVENOUS at 06:11

## 2022-12-07 RX ADMIN — FAMOTIDINE 20 MG: 20 TABLET, FILM COATED ORAL at 20:55

## 2022-12-07 RX ADMIN — Medication 3 MG: at 09:29

## 2022-12-07 RX ADMIN — ACETAMINOPHEN 650 MG: 325 TABLET, FILM COATED ORAL at 20:55

## 2022-12-07 RX ADMIN — PROPOFOL 150 MCG/KG/MIN: 10 INJECTION, EMULSION INTRAVENOUS at 07:22

## 2022-12-07 RX ADMIN — ACETAMINOPHEN 1000 MG: 500 TABLET ORAL at 06:57

## 2022-12-07 RX ADMIN — GLYCOPYRROLATE 0.4 MG: 0.2 INJECTION INTRAMUSCULAR; INTRAVENOUS at 09:29

## 2022-12-07 RX ADMIN — FENTANYL CITRATE 25 MCG: 50 INJECTION INTRAMUSCULAR; INTRAVENOUS at 10:02

## 2022-12-07 RX ADMIN — CEFAZOLIN SODIUM 1 G: 1 INJECTION, SOLUTION INTRAVENOUS at 16:36

## 2022-12-07 RX ADMIN — FAMOTIDINE 20 MG: 20 TABLET, FILM COATED ORAL at 14:45

## 2022-12-07 RX ADMIN — DROPERIDOL 0.62 MG: 2.5 INJECTION, SOLUTION INTRAMUSCULAR; INTRAVENOUS at 10:01

## 2022-12-07 RX ADMIN — HYDROMORPHONE HYDROCHLORIDE 1 MG: 1 INJECTION, SOLUTION INTRAMUSCULAR; INTRAVENOUS; SUBCUTANEOUS at 19:43

## 2022-12-07 RX ADMIN — FENTANYL CITRATE 50 MCG: 0.05 INJECTION, SOLUTION INTRAMUSCULAR; INTRAVENOUS at 07:15

## 2022-12-07 RX ADMIN — FENTANYL CITRATE 100 MCG: 0.05 INJECTION, SOLUTION INTRAMUSCULAR; INTRAVENOUS at 07:48

## 2022-12-07 RX ADMIN — ZOLPIDEM TARTRATE 10 MG: 5 TABLET ORAL at 20:55

## 2022-12-07 RX ADMIN — Medication 3 ML: at 14:45

## 2022-12-07 RX ADMIN — HYDROMORPHONE HYDROCHLORIDE 0.5 MG: 1 INJECTION, SOLUTION INTRAMUSCULAR; INTRAVENOUS; SUBCUTANEOUS at 10:18

## 2022-12-07 RX ADMIN — BENZOCAINE 6 MG-MENTHOL 10 MG LOZENGES 1 LOZENGE: at 21:46

## 2022-12-07 RX ADMIN — ROCURONIUM BROMIDE 40 MG: 50 INJECTION INTRAVENOUS at 07:50

## 2022-12-07 RX ADMIN — MIRTAZAPINE 15 MG: 15 TABLET, FILM COATED ORAL at 20:55

## 2022-12-07 RX ADMIN — ROCURONIUM BROMIDE 10 MG: 50 INJECTION INTRAVENOUS at 07:19

## 2022-12-07 RX ADMIN — FENTANYL CITRATE 25 MCG: 50 INJECTION INTRAMUSCULAR; INTRAVENOUS at 10:09

## 2022-12-07 RX ADMIN — DOCUSATE SODIUM 50 MG AND SENNOSIDES 8.6 MG 1 TABLET: 8.6; 5 TABLET, FILM COATED ORAL at 21:45

## 2022-12-07 RX ADMIN — FENTANYL CITRATE 25 MCG: 50 INJECTION INTRAMUSCULAR; INTRAVENOUS at 10:14

## 2022-12-07 RX ADMIN — BUPROPION HYDROCHLORIDE 150 MG: 150 TABLET, FILM COATED, EXTENDED RELEASE ORAL at 14:45

## 2022-12-07 RX ADMIN — HYDROMORPHONE HYDROCHLORIDE 0.5 MG: 1 INJECTION, SOLUTION INTRAMUSCULAR; INTRAVENOUS; SUBCUTANEOUS at 10:51

## 2022-12-07 RX ADMIN — SODIUM CHLORIDE 75 ML/HR: 9 INJECTION, SOLUTION INTRAVENOUS at 14:46

## 2022-12-07 RX ADMIN — FENTANYL CITRATE 100 MCG: 0.05 INJECTION, SOLUTION INTRAMUSCULAR; INTRAVENOUS at 07:39

## 2022-12-07 RX ADMIN — LEVOTHYROXINE SODIUM 25 MCG: 25 TABLET ORAL at 14:45

## 2022-12-07 NOTE — OP NOTE
LUMBAR ANTERIOR INTERBODY FUSION  Procedure Note    Amanda Bhandari  12/7/2022    Pre-op Diagnosis:    1. Low back pain.  2. Right buttock, thigh and leg radiculopathy.   3. Multilevel lumbar degenerative disc disease, worse L5-S1.   4. Multilevel lumbar facet arthropathy, worse right L5-S1.   5. Thoracolumbar degenerative scoliosis, concave left T11 to L4, concave right L5-S1.   6. Right-sided foraminal stenosis L5-S1.   7. Possible right sacroiliitis.    Post-op Diagnosis:    same    Procedure/CPT® Codes:     1. Anterior discectomy decompression with bilateral neural foraminotomy L5-S1  2. Anterior lumbar interbody fusion L5-S1  3. Anterior spinal instrumentation L5-S1 (ATEC anterior plate and screws)  4. Use of PEEK interbody biomechanical device for fusion L5-S1 (NuVasive PEEK spacer and screws)  5. Use of allograft bone matrix for fusion L5-S1 ([employer] Catalyst)  6. Use of fluoroscopy for confirmation of surgical level, placement of interbody spacer and instrumentation  7. Intraoperative neural monitoring     Anesthesia: General     Surgeon: JALIL Cabral MD     Co Surgeon: Dr. Fritz Mims D.O.     Assistant: Yuri Webb PA-C     Estimated Blood Loss: 50 mL     Complications: None     Condition: Stable to PACU.     Indications:     The patient is a 67-year-old who sees Dr. Jd Watters for medical issues.  She presented to the office with low back pain along with right buttock, thigh, and leg radiculopathy.  Imaging studies revealed multilevel lumbar degenerative disc disease and facet arthropathy that was worse at L5-S1.  She was also noted to have a thoracolumbar degenerative scoliosis that was concave to the left from T11-L4 and concave to the right at L5-S1.  The degenerative changes including the facet arthropathy and the scoliosis focally concave to the right combined to create severe right-sided foraminal stenosis at the L5-S1 level that was felt to be contributing to the vast majority of  her symptoms.    After failing all conservative measures, it was mutually decided that surgery would be the best option.  Risks, benefits, and complications of surgery were discussed in detail. The patient appeared well informed and wished to proceed. We specifically discussed the risk of infection, blood loss, nerve root injury, CSF leak, and the possibility of incomplete resolution of symptoms. We also discussed the possible risk of a nonunion and the potential need for additional surgery in the event of a pseudoarthrosis or hardware failure.    We elected to proceed with a staged operation.  Today we are performing an anterior decompression and fusion of L5-S1, it is planned that we will be returning to surgery for a second posterior procedure at a later date.     Operative Procedure:     After obtaining informed consent and verifying the correct operative level, the patient was brought to the operating room and placed supine on an operating table. A general anesthetic was provided by the anesthesia service with the assistance of an endotracheal tube. Once this was appropriately positioned and secured, the anterior abdominal region was prepped and draped in usual sterile fashion. A surgical timeout was taken to confirm this was the correct patient, we were working at the correct level, and that preoperative antibiotics were given in a timely fashion.     At this point, Dr. Fritz Mims D.O. provided vascular access to the L5-S1 level. He performed a left sided anterior retroperitoneal approach to the L5-S1 segment. Please see his separate dictated operative report regarding the details on the approach itself.  When I entered the procedure, self retaining retractors were already in position with excellent exposure of the L5-S1 disc space.     After confirming we were at the correct level using fluoroscopy, I used a long handle 10 blade scalpel to cut into the L5-S1 disc space. A Colón elevator was used to remove  disc material off of the endplates. Disc material was retrieved using pituitaries and Kerrisons. A disc space distractor was then placed into the L5-S1 disc space and I used curettes to remove posterior disc material. Kerrisons were used to remove posterior osteophytes across the endplates of L5 and S1. There was high-grade stenosis centrally but also foraminally especially on the right corresponding to the focal scoliosis that was present. I then performed a bilateral neural foraminotomy with Kerrisons and curettes. The decompression was much more involved than what is usually required for an anterior lumbar interbody fusion by itself and required significantly more time to perform.  This was due to the severe disc space collapse and high-grade foraminal stenosis, again especially on the right.     After the decompression was completed bilaterally and centrally, I used a series of endplate scrapers to prepare the endplates for interbody fusion. Trial spacers were then malleted into position and it was felt that an 8 mm anterior PEEK spacer with 20 degrees lordosis from the NuVasive instrumentation set would be the best fit to restore disc height also restoring foraminal height and providing some indirect decompression. The disc space was then thoroughly irrigated with saline solution.  Gelfoam powder with thrombin was used to control epidural bleeding.     And 8 mm PEEK spacer from the NuVasive instrumentation set was then packed as tightly as possible with an allograft bone matrix called Catalyst from [employer]. This spacer was then malleted into the L5-S1 disc space under fluoroscopic guidance. It was placed as an interbody biomechanical device to assist with fusion.  I then placed screws through the spacer into both the L5 and S1 vertebral bodies to help maintain position of the spacer as well as to assist with the fusion process.     A 4-hole anterior plate from the [facility] instrumentation set was then chosen. The  4 holes were drilled and four 30 mm screws were used to fix the plate across the L5-S1 segment augmenting the fusion.      We then inspected the entire operative field for any signs of bleeding.  Bleeding was once again controlled using thrombin with Gelfoam powder and bipolar cautery.  Once we ensured that adequate hemostasis was accomplished, final fluoroscopy imaging was taken to confirm adequate position of our implants. There was excellent restoration of disc height and the plate and screw construct appeared to be adequately positioned across L5-S1.     Please see Dr. Fritz Mims's separate dictated operative report regarding the closure of the wound. Once this was accomplished, the patient was extubated and sent to the recovery room in good stable condition. We estimated blood loss to be approximately 50 mL and the patient remained hemodynamically stable during the procedure.     Intraoperative neuro monitoring was ordered and carried out throughout the procedure to add an increased level of safety for the patient.  The interpreting physician was available by means of real-time continuous, bidirectional, remote audio and visual communication as needed throughout the entire procedure.  Modalities used during the procedure included SSEP, MEP, EMG, and TOF.  There were no neuro monitoring signal changes during the procedure.    Dr. Fritz Mims D.O. provided vascular access to the L5-S1 level and acted as a co-surgeon in this fashion.  Yuri Webb PA-C provided critical assistance during the decompression at L5-S1 as well as during the placement of the PEEK spacer, bone graft and instrumentation to obtain a fusion across L5-S1.    JALIL Cabral MD    Date: 12/7/2022  Time: 09:05 CST

## 2022-12-07 NOTE — CASE MANAGEMENT/SOCIAL WORK
Discharge Planning Assessment  [facility]     Patient Name: Amanda Bhandari  MRN: [id number]  Today's Date: 12/7/2022    Admit Date: 12/7/2022        Discharge Needs Assessment     Row Name 12/07/22 1520       Living Environment    People in Home spouse    Name(s) of People in Home Supa , spouse    Current Living Arrangements home    Primary Care Provided by self;spouse/significant other;other (see comments)  sister assist with care as needed    Provides Primary Care For no one, unable/limited ability to care for self    Caregiving Concerns spinal surgery    Family Caregiver if Needed spouse;sibling(s)    Family Caregiver Names Supa, spouse;  out of town with mother who needs care; sister will be caring for her in sister's home, [place]    Quality of Family Relationships unable to assess    Able to Return to Prior Arrangements yes       Resource/Environmental Concerns    Resource/Environmental Concerns none       Transition Planning    Patient/Family Anticipates Transition to home with family    Patient/Family Anticipated Services at Transition none    Transportation Anticipated family or friend will provide       Discharge Needs Assessment    Readmission Within the Last 30 Days no previous admission in last 30 days    Equipment Currently Used at Home none    Concerns to be Addressed no discharge needs identified    Equipment Needed After Discharge none    Discharge Coordination/Progress Plan home with sister in [place]. Denies need for HH/ DME at this time. Patient has PCP and rx coverage. Brace at bedside. BSC at home if needed. Sister has RW she can use if needed. spouse is currently assist his mother with care in [place]               Discharge Plan    No documentation.               Continued Care and Services - Admitted Since 12/7/2022    Coordination has not been started for this encounter.          Demographic Summary    No documentation.                Functional Status    No  documentation.                Psychosocial    No documentation.                Abuse/Neglect    No documentation.                Legal    No documentation.                Substance Abuse    No documentation.                Patient Forms    No documentation.                   Clemencia Bustos RN

## 2022-12-07 NOTE — PLAN OF CARE
Goal Outcome Evaluation:  Plan of Care Reviewed With: patient        Progress: no change   Pt alert and oriented x4. VSS. Pt c/o pain, PRN medication given. GUERRERO. PPP. SCDs for VTE. , 2L NC. Tolerating prescribed diet. Skin intact. Incision site, CDI. Voiding via iqbal cath. Bedrest until tomorrow. Last BM 12/5. Bed alarm set. Call light within reach. Safety maintained.

## 2022-12-07 NOTE — NURSING NOTE
Pt transferred from PACU to floor. Pt is alert and orientated x4. GUERRERO. PPP. No c/o pain at this time. 2L NC. SCD's for VTE. Bed alarm set. Call light within reach. Safety maintained.

## 2022-12-07 NOTE — ANESTHESIA POSTPROCEDURE EVALUATION
"Patient: Amanda Bhandari    Procedure Summary     Date: 12/07/22 Room / Location: [facility] OR  /  [facility] OR    Anesthesia Start: 0715 Anesthesia Stop: 0944    Procedures:       ANTERIOR DECOMPRESSION, ANTERIOR LUMBAR INTERBODY FUSION WITH INSTRUMENTATION L5-S1, WITH EXPOSURE AND CLOSURE (Spine Lumbar)      ANTERIOR LUMBAR EXPOSURE Diagnosis: (M54.16)    Surgeons: MIGUELANGEL Cabral MD Provider: Mike Erwin CRNA    Anesthesia Type: general ASA Status: 2          Anesthesia Type: general    Vitals  Vitals Value Taken Time   /63 12/07/22 1306   Temp 97.7 °F (36.5 °C) 12/07/22 1306   Pulse 87 12/07/22 1306   Resp 16 12/07/22 1306   SpO2 100 % 12/07/22 1306           Post Anesthesia Care and Evaluation    Patient location during evaluation: PACU  Patient participation: complete - patient participated  Level of consciousness: awake and alert  Pain management: adequate    Airway patency: patent  Anesthetic complications: No anesthetic complications    Cardiovascular status: acceptable  Respiratory status: acceptable  Hydration status: acceptable    Comments: Blood pressure 128/82, pulse 76, temperature 97.7 °F (36.5 °C), temperature source Oral, resp. rate 18, height 157.5 cm (62.01\"), weight 83.1 kg (183 lb 3.2 oz), SpO2 100 %, not currently breastfeeding.    Pt discharged from PACU based on sancho score >8      "

## 2022-12-07 NOTE — ANESTHESIA PREPROCEDURE EVALUATION
Anesthesia Evaluation     Patient summary reviewed   no history of anesthetic complications:  NPO Solid Status: > 8 hours  NPO Liquid Status: > 8 hours           Airway   Mallampati: II  TM distance: >3 FB  Dental      Comment: veneers    Pulmonary    (+) pulmonary embolism (2020),   (-) asthma, sleep apnea  Cardiovascular   Exercise tolerance: good (4-7 METS)    ECG reviewed    (-) hypertension (no meds), past MI, CAD, dysrhythmias, cardiac stents, hyperlipidemia (no meds)      Neuro/Psych  (-) seizures, TIA, CVA  GI/Hepatic/Renal/Endo    (+) obesity,   thyroid problem   (-) liver disease, no renal disease, diabetes    Musculoskeletal     (+) back pain, chronic pain,   Abdominal    Substance History      OB/GYN          Other                        Anesthesia Plan    ASA 2     general     intravenous induction     Anesthetic plan, risks, benefits, and alternatives have been provided, discussed and informed consent has been obtained with: patient.        CODE STATUS:

## 2022-12-07 NOTE — ANESTHESIA PROCEDURE NOTES
Airway  Urgency: elective    Date/Time: 12/7/2022 7:21 AM  Airway not difficult    General Information and Staff    Patient location during procedure: OR  CRNA/CAA: Mike Erwin CRNA    Indications and Patient Condition  Indications for airway management: airway protection    Preoxygenated: yes  Mask difficulty assessment: 1 - vent by mask    Final Airway Details  Final airway type: endotracheal airway      Successful airway: ETT  Cuffed: yes   Successful intubation technique: video laryngoscopy  Endotracheal tube insertion site: oral  Blade: Ford  Blade size: 3  ETT size (mm): 7.0  Cormack-Lehane Classification: grade I - full view of glottis  Placement verified by: capnometry   Measured from: lips  Number of attempts at approach: 1  Assessment: lips, teeth, and gum same as pre-op and atraumatic intubation

## 2022-12-07 NOTE — OP NOTE
Amanda Bhandari  12/7/2022     PREOPERATIVE DIAGNOSIS:   1. Low back pain.  2. Right buttock, thigh and leg radiculopathy.   3. Multilevel lumbar degenerative disc disease, worse L5-S1.   4. Multilevel lumbar facet arthropathy, worse right L5-S1.   5. Thoracolumbar degenerative scoliosis, concave left T11 to L4, concave right L5-S1.   6. Right-sided foraminal stenosis L5-S1.   7. Possible right sacroiliitis.     POSTOPERATIVE DIAGNOSIS: same     PROCEDURE PERFORMED:   1.  Anterior lumbar interbody fusion of L5-S1 with instrumentation     SURGEON: Fritz Mims DO   COSURGEON: Mike Cabral MD     ANESTHESIA: General.    PREPARATION: Routine.    STAFF: Circulator: Samia Ng RN; Grace Rahman RN  Physician Assistant: Yuri Webb PA-C  Scrub Person: Lyn Roach; Ara Wan; Kianna Ruiz  Vendor Representative: Eric Bailey    ESTIMATED BLOOD LOSS: 50 mL    SPECIMENS: None    COMPLICATIONS: None    INDICATIONS: Amanda Bhandari is a 67 y.o. female who you are currently following for chronic back pain.  Amanda Nobles scheduled for anterior lumbar interbody fusion of L5-S1 with Dr. Cabral on 12/7/22.  She has undergone previous abdominal surgeries including, tubal pregnancy, hysterectomy, and exploratory laparotomy.  She has history of PE in 2020, no evidence of DVT. The indications, risks, and possible complications of the procedure were explained to the patient, who voiced understanding and wished to proceed with surgery.     PROCEDURE IN DETAIL:   The patient was taken to the operating room and placed on the operating table in a supine position. After general anesthesia was obtained, the abdomen was prepped and draped in a sterile manner.  A transverse incision was then made in the left lower quadrant.  Careful dissection was made down through the subcutaneous tissues using the Bovie cautery to ensure hemostasis.  Any crossing veins were ligated with 3-0 silk suture and  hemoclips.  The rectus fascia was identified.  It was incised with the Bovie cautery.  Kocher clamps are placed on each side of the rectus fascia and subfascial planes were established in a cephalad and caudad direction.  Once the subfascial planes were established the attention was then turned to the left rectus muscle.  Blunt mobilization was made of the left rectus muscle including its blood supply medially and to the right.  Once it was fully mobilized the attention was then turned laterally to the peritoneal reflection.  Entrance into the retroperitoneal space was established with the use of a sponge stick and the Bovie cautery.  Continued blunt mobilization was made with my hand using a finger sweeping motion moving the peritoneal contents and sacral fat pad medially and to the right.  Once it was fully mobilized the Sulma-Viveros retractor system was set up.  The retractor blades were set in place.  The left iliac vein was then carefully dissected free and placed safely behind the retractor blade.  The sacral vessels were carefully taken down with hemoclips.  At this point full exposure was established of the L5-S1 disc space.  The next part of the case will be dictated by Dr. Cabral. Upon completion of Dr. Cabral's part of the case the wound bed was irrigated with antibiotic saline and hemostasis was observed.  The retractor blades were carefully taken out one at a time.  The structures were then placed back in their normal anatomic positions.  The rectus fascia was then closed with a #1 PDS in a running fashion to meet in the midline.  The deep layers were closed with a 2-0 Vicryl in a running fashion.  The subcutaneous layers were closed with a 3-0 Vicryl in a running fashion.  The skin was then reapproximated using a 4-0 Monocryl in a subcuticular fashion.  The wound was then cleaned.  Sterile dressings were applied. The patient tolerated the procedure well. Sponge and needle counts were correct. The  patient was then awakened and extubated in the operating room and taken to the recovery room in good condition.    Fritz Mims,     Date: 12/7/2022 Time: 09:33 CST

## 2022-12-07 NOTE — NURSING NOTE
Dr. Cabral notified about pt allergy to oxycodone without benadryl. Pt states she has tolerated percocet with benadryl in the past. Per Md orders hold oxycontin pre-op dose at this time. No further orders were given.

## 2022-12-07 NOTE — PLAN OF CARE
Goal Outcome Evaluation: patient is received from PACU, GUERRERO, getting fitted with brace, LSO. Patient is alert and oriented x 4, , SDC on. VSS. Comfortable at this time.

## 2022-12-08 LAB
ANION GAP SERPL CALCULATED.3IONS-SCNC: 7 MMOL/L (ref 5–15)
BASOPHILS # BLD AUTO: 0.04 10*3/MM3 (ref 0–0.2)
BASOPHILS NFR BLD AUTO: 0.6 % (ref 0–1.5)
BUN SERPL-MCNC: 9 MG/DL (ref 8–23)
BUN/CREAT SERPL: 7.7 (ref 7–25)
CALCIUM SPEC-SCNC: 8.2 MG/DL (ref 8.6–10.5)
CHLORIDE SERPL-SCNC: 107 MMOL/L (ref 98–107)
CO2 SERPL-SCNC: 27 MMOL/L (ref 22–29)
CREAT SERPL-MCNC: 1.17 MG/DL (ref 0.57–1)
DEPRECATED RDW RBC AUTO: 47.9 FL (ref 37–54)
EGFRCR SERPLBLD CKD-EPI 2021: 51.6 ML/MIN/1.73
EOSINOPHIL # BLD AUTO: 0.07 10*3/MM3 (ref 0–0.4)
EOSINOPHIL NFR BLD AUTO: 1.1 % (ref 0.3–6.2)
ERYTHROCYTE [DISTWIDTH] IN BLOOD BY AUTOMATED COUNT: 13.2 % (ref 12.3–15.4)
FERRITIN SERPL-MCNC: 58.96 NG/ML (ref 13–150)
FLUAV AG NPH QL: NEGATIVE
FLUBV AG NPH QL IA: NEGATIVE
FOLATE SERPL-MCNC: 4.53 NG/ML (ref 4.78–24.2)
GLUCOSE SERPL-MCNC: 98 MG/DL (ref 65–99)
HCT VFR BLD AUTO: 32.5 % (ref 34–46.6)
HGB BLD-MCNC: 9.4 G/DL (ref 12–15.9)
IMM GRANULOCYTES # BLD AUTO: 0.02 10*3/MM3 (ref 0–0.05)
IMM GRANULOCYTES NFR BLD AUTO: 0.3 % (ref 0–0.5)
IRON 24H UR-MRATE: 24 MCG/DL (ref 37–145)
IRON SATN MFR SERPL: 8 % (ref 20–50)
LYMPHOCYTES # BLD AUTO: 1.77 10*3/MM3 (ref 0.7–3.1)
LYMPHOCYTES NFR BLD AUTO: 27.6 % (ref 19.6–45.3)
MCH RBC QN AUTO: 29 PG (ref 26.6–33)
MCHC RBC AUTO-ENTMCNC: 28.9 G/DL (ref 31.5–35.7)
MCV RBC AUTO: 100.3 FL (ref 79–97)
MONOCYTES # BLD AUTO: 0.37 10*3/MM3 (ref 0.1–0.9)
MONOCYTES NFR BLD AUTO: 5.8 % (ref 5–12)
NEUTROPHILS NFR BLD AUTO: 4.15 10*3/MM3 (ref 1.7–7)
NEUTROPHILS NFR BLD AUTO: 64.6 % (ref 42.7–76)
NRBC BLD AUTO-RTO: 0 /100 WBC (ref 0–0.2)
PLATELET # BLD AUTO: 237 10*3/MM3 (ref 140–450)
PMV BLD AUTO: 9.4 FL (ref 6–12)
POTASSIUM SERPL-SCNC: 4.3 MMOL/L (ref 3.5–5.2)
RBC # BLD AUTO: 3.24 10*6/MM3 (ref 3.77–5.28)
SARS-COV-2 AG RESP QL IA.RAPID: NORMAL
SODIUM SERPL-SCNC: 141 MMOL/L (ref 136–145)
TIBC SERPL-MCNC: 294 MCG/DL (ref 298–536)
TRANSFERRIN SERPL-MCNC: 197 MG/DL (ref 200–360)
VIT B12 BLD-MCNC: 592 PG/ML (ref 211–946)
WBC NRBC COR # BLD: 6.42 10*3/MM3 (ref 3.4–10.8)

## 2022-12-08 PROCEDURE — 80048 BASIC METABOLIC PNL TOTAL CA: CPT | Performed by: STUDENT IN AN ORGANIZED HEALTH CARE EDUCATION/TRAINING PROGRAM

## 2022-12-08 PROCEDURE — 25010000002 CEFAZOLIN 1-4 GM/50ML-% SOLUTION: Performed by: ORTHOPAEDIC SURGERY

## 2022-12-08 PROCEDURE — 82728 ASSAY OF FERRITIN: CPT | Performed by: STUDENT IN AN ORGANIZED HEALTH CARE EDUCATION/TRAINING PROGRAM

## 2022-12-08 PROCEDURE — 82607 VITAMIN B-12: CPT | Performed by: STUDENT IN AN ORGANIZED HEALTH CARE EDUCATION/TRAINING PROGRAM

## 2022-12-08 PROCEDURE — 0 HYDROMORPHONE 1 MG/ML SOLUTION: Performed by: ORTHOPAEDIC SURGERY

## 2022-12-08 PROCEDURE — 84466 ASSAY OF TRANSFERRIN: CPT | Performed by: STUDENT IN AN ORGANIZED HEALTH CARE EDUCATION/TRAINING PROGRAM

## 2022-12-08 PROCEDURE — 87426 SARSCOV CORONAVIRUS AG IA: CPT | Performed by: STUDENT IN AN ORGANIZED HEALTH CARE EDUCATION/TRAINING PROGRAM

## 2022-12-08 PROCEDURE — 83540 ASSAY OF IRON: CPT | Performed by: STUDENT IN AN ORGANIZED HEALTH CARE EDUCATION/TRAINING PROGRAM

## 2022-12-08 PROCEDURE — 82746 ASSAY OF FOLIC ACID SERUM: CPT | Performed by: STUDENT IN AN ORGANIZED HEALTH CARE EDUCATION/TRAINING PROGRAM

## 2022-12-08 PROCEDURE — 85025 COMPLETE CBC W/AUTO DIFF WBC: CPT | Performed by: STUDENT IN AN ORGANIZED HEALTH CARE EDUCATION/TRAINING PROGRAM

## 2022-12-08 RX ORDER — GUAIFENESIN 600 MG/1
600 TABLET, EXTENDED RELEASE ORAL EVERY 12 HOURS SCHEDULED
Status: DISCONTINUED | OUTPATIENT
Start: 2022-12-08 | End: 2022-12-11 | Stop reason: HOSPADM

## 2022-12-08 RX ORDER — TRAZODONE HYDROCHLORIDE 50 MG/1
50 TABLET ORAL NIGHTLY
Status: DISCONTINUED | OUTPATIENT
Start: 2022-12-08 | End: 2022-12-10

## 2022-12-08 RX ORDER — FLUTICASONE PROPIONATE 50 MCG
2 SPRAY, SUSPENSION (ML) NASAL DAILY
Status: DISCONTINUED | OUTPATIENT
Start: 2022-12-08 | End: 2022-12-11 | Stop reason: HOSPADM

## 2022-12-08 RX ORDER — DEXTROMETHORPHAN POLISTIREX 30 MG/5ML
30 SUSPENSION ORAL 2 TIMES DAILY PRN
Status: DISCONTINUED | OUTPATIENT
Start: 2022-12-08 | End: 2022-12-11 | Stop reason: HOSPADM

## 2022-12-08 RX ORDER — BENZONATATE 100 MG/1
100 CAPSULE ORAL 3 TIMES DAILY PRN
Status: DISCONTINUED | OUTPATIENT
Start: 2022-12-08 | End: 2022-12-11 | Stop reason: HOSPADM

## 2022-12-08 RX ORDER — FERROUS SULFATE 325(65) MG
325 TABLET ORAL
Status: DISCONTINUED | OUTPATIENT
Start: 2022-12-08 | End: 2022-12-11 | Stop reason: HOSPADM

## 2022-12-08 RX ORDER — TRAMADOL HYDROCHLORIDE 50 MG/1
50 TABLET ORAL EVERY 8 HOURS PRN
COMMUNITY
End: 2022-12-11 | Stop reason: HOSPADM

## 2022-12-08 RX ORDER — BUPIVACAINE HCL/0.9 % NACL/PF 0.1 %
2 PLASTIC BAG, INJECTION (ML) EPIDURAL ONCE
Status: COMPLETED | OUTPATIENT
Start: 2022-12-09 | End: 2022-12-09

## 2022-12-08 RX ADMIN — MIRTAZAPINE 15 MG: 15 TABLET, FILM COATED ORAL at 21:13

## 2022-12-08 RX ADMIN — BUPROPION HYDROCHLORIDE 150 MG: 150 TABLET, FILM COATED, EXTENDED RELEASE ORAL at 08:05

## 2022-12-08 RX ADMIN — FAMOTIDINE 20 MG: 20 TABLET, FILM COATED ORAL at 08:05

## 2022-12-08 RX ADMIN — HYDROCODONE BITARTRATE AND ACETAMINOPHEN 1 TABLET: 10; 325 TABLET ORAL at 17:54

## 2022-12-08 RX ADMIN — FERROUS SULFATE TAB 325 MG (65 MG ELEMENTAL FE) 325 MG: 325 (65 FE) TAB at 09:05

## 2022-12-08 RX ADMIN — DOCUSATE SODIUM 50 MG AND SENNOSIDES 8.6 MG 1 TABLET: 8.6; 5 TABLET, FILM COATED ORAL at 21:13

## 2022-12-08 RX ADMIN — ACETAMINOPHEN 650 MG: 325 TABLET, FILM COATED ORAL at 21:15

## 2022-12-08 RX ADMIN — FAMOTIDINE 20 MG: 20 TABLET, FILM COATED ORAL at 21:13

## 2022-12-08 RX ADMIN — POLYETHYLENE GLYCOL 3350 17 G: 17 POWDER, FOR SOLUTION ORAL at 08:04

## 2022-12-08 RX ADMIN — HYDROMORPHONE HYDROCHLORIDE 1 MG: 1 INJECTION, SOLUTION INTRAMUSCULAR; INTRAVENOUS; SUBCUTANEOUS at 08:00

## 2022-12-08 RX ADMIN — SODIUM CHLORIDE 75 ML/HR: 9 INJECTION, SOLUTION INTRAVENOUS at 03:34

## 2022-12-08 RX ADMIN — Medication 3 ML: at 08:05

## 2022-12-08 RX ADMIN — ACETAMINOPHEN 650 MG: 325 TABLET, FILM COATED ORAL at 12:28

## 2022-12-08 RX ADMIN — ZOLPIDEM TARTRATE 10 MG: 5 TABLET ORAL at 21:13

## 2022-12-08 RX ADMIN — LEVOTHYROXINE SODIUM 25 MCG: 25 TABLET ORAL at 05:20

## 2022-12-08 RX ADMIN — TRAZODONE HYDROCHLORIDE 50 MG: 50 TABLET ORAL at 21:13

## 2022-12-08 RX ADMIN — DOCUSATE SODIUM 50 MG AND SENNOSIDES 8.6 MG 1 TABLET: 8.6; 5 TABLET, FILM COATED ORAL at 08:05

## 2022-12-08 RX ADMIN — GUAIFENESIN 600 MG: 600 TABLET, EXTENDED RELEASE ORAL at 21:13

## 2022-12-08 RX ADMIN — CEFAZOLIN SODIUM 1 G: 1 INJECTION, SOLUTION INTRAVENOUS at 09:32

## 2022-12-08 RX ADMIN — GUAIFENESIN 600 MG: 600 TABLET, EXTENDED RELEASE ORAL at 09:05

## 2022-12-08 RX ADMIN — HYDROMORPHONE HYDROCHLORIDE 1 MG: 1 INJECTION, SOLUTION INTRAMUSCULAR; INTRAVENOUS; SUBCUTANEOUS at 01:33

## 2022-12-08 RX ADMIN — FLUTICASONE PROPIONATE 2 SPRAY: 50 SPRAY, METERED NASAL at 09:05

## 2022-12-08 NOTE — PLAN OF CARE
Problem: Adult Inpatient Plan of Care  Goal: Plan of Care Review  Outcome: Ongoing, Progressing  Flowsheets (Taken 12/8/2022 0630)  Progress: no change  Outcome Evaluation: PT is A&Ox4. PRN pain meds given with good relief. North in place. 2L at times while sleeping. VSS. Safety maintained.

## 2022-12-08 NOTE — PROGRESS NOTES
Amanda Bhandari  67 y.o.  female  Subjective     Post-Operative Day # 1    Systemic or Specific Complaints:     Patient complains of headache at this time.  Otherwise pain well controlled medication.  Patient states that Tylenol is helping mildly.  Patient on bedrest due to likely osteoporosis.  Plan second stage tomorrow.    Objective     Vital signs in last 24 hours:  Temp:  [97.8 °F (36.6 °C)-99.3 °F (37.4 °C)] 98.6 °F (37 °C)  Heart Rate:  [] 87  Resp:  [18] 18  BP: (103-127)/(52-67) 116/61    Physical Exam:    Alert and oriented ×3, no acute distress, grossly neurovascularly intact, vital signs stable, dressing clean dry and intact, moving all extremities without focal deficit      Diagnostic Data:  CBC:  Results from last 7 days   Lab Units 12/08/22  0528   WBC 10*3/mm3 6.42   RBC 10*6/mm3 3.24*   HEMOGLOBIN g/dL 9.4*   HEMATOCRIT % 32.5*   PLATELETS 10*3/mm3 237          Assessment & Plan     1. Low back pain.  2. Right buttock, thigh and leg radiculopathy.   3. Multilevel lumbar degenerative disc disease, worse L5-S1.   4. Multilevel lumbar facet arthropathy, worse right L5-S1.   5. Thoracolumbar degenerative scoliosis, concave left T11 to L4, concave right L5-S1.   6. Right-sided foraminal stenosis L5-S1.   7. Possible right sacroiliitis.  8.  Status post anterior discectomy decompression with bilateral neural foraminotomy L5-S1, ALIF with instrumentation L5-S1, 12/7/2022      Plan:  1. Plan second stage surgery tomorrow  2. Npo after midnight  3. periops / PT/OT/Brace today          Yuri Webb PA-C    Date: 12/8/2022  Time: 17:55 CST

## 2022-12-08 NOTE — CONSULTS
Consult Note    Referring Provider: Dr. Cabral  Reason for Consultation: Medical co-management    Patient Care Team:  Phil Hodges MD as PCP - General (General Practice)    Chief complaint: Back pain    Subjective .     History of present illness:      Ms. Bhandari is a 67-year-old female with past medical history of hypothyroidism and hypertension who presents for lumbar surgery.  She has a long history of low back pain unresponsive to conservative measures.  She was evaluated at the [facility] as an outpatient determined the best method of treatment was to pursue surgical fixation.  After careful consideration with Dr. Cabral, she decided to proceed with surgical intervention and underwent uncomplicated operation yesterday.    This morning she is doing well and is without complaint.  She is on oxygen briefly overnight but no longer requires it.  She is in the expected amount of postsurgical pain.  She does endorse sore throat and cough over the past couple days and think she has COVID.      REVIEW OF SYSTEMS:    CONSTITUTIONAL:  Negative for anorexia, chills, fevers, night sweats and weight loss  EYES:  negative for eye dryness, icterus and redness  HEENT:   negative for dental problems, epistaxis, facial trauma and thrush  RESPIRATORY:  negative for chest tightness, cough, dyspnea on exertion, pneumonia and sputum  CARDIOVASCULAR: negative for chest pain, dyspnea, exertional chest pressure/discomfort, irregular heart beat, palpitations, paroxysmal nocturnal dyspnea and syncope  GASTROINTESTINAL:  negative for abdominal pain, hematemesis, jaundice, melena and rectal bleeding. No significant changes in bowel habits preoperatively.   MUSCULOSKELETAL:  negative for muscle weakness, myalgias and neck pain, outside of surgical issues noted above  NEUROLOGICAL:   negative for dizziness, headaches, seizures, speech problems, tremors and vertigo  INTEGUMENT: negative for pruritus, rash, skin color  change and skin lesion(s)         History    Past Medical History:   Diagnosis Date   • Arthritis    • Disease of thyroid gland    • History of transfusion 1977 tubal pregnancy ruptured   • Hypertension    • Hypothyroidism    • Pulmonary embolism (HCC)      Past Surgical History:   Procedure Laterality Date   • ABLATION OF DYSRHYTHMIC FOCUS      Pre Daugherty's   • ANTERIOR LUMBAR EXPOSURE N/A 2022    Procedure: ANTERIOR LUMBAR EXPOSURE;  Surgeon: MIGUELANGEL Cabral MD;  Location:  [facility] OR;  Service: Orthopedic Spine;  Laterality: N/A;   • APPENDECTOMY     • DIAGNOSTIC LAPAROSCOPY EXPLORATORY LAPAROTOMY N/A     FROM HORSE INJURY   • FRACTURE SURGERY Left     ANKLE, PLATE PLACED   • HYSTERECTOMY     • JOINT REPLACEMENT Bilateral    • LUMBAR FUSION N/A 2022    Procedure: ANTERIOR DECOMPRESSION, ANTERIOR LUMBAR INTERBODY FUSION WITH INSTRUMENTATION L5-S1, WITH EXPOSURE AND CLOSURE;  Surgeon: MIGUELANGEL Cabral MD;  Location:  [facility];  Service: Orthopedic Spine;  Laterality: N/A;   • SHOULDER SURGERY Left    • WRIST SURGERY       Family History   Problem Relation Age of Onset   • Cancer Mother         Ovarian   • Heart attack Mother    • Cancer Father         Esophageal cancer, prostate cancer     Social History     Tobacco Use   • Smoking status: Former     Packs/day: 1.50     Years: 15.00     Pack years: 22.50     Types: Cigarettes     Quit date: 2014     Years since quittin.9   • Smokeless tobacco: Never   Vaping Use   • Vaping Use: Never used   Substance Use Topics   • Alcohol use: Not Currently     Comment: VERY SELDOM   • Drug use: Never     Medications Prior to Admission   Medication Sig Dispense Refill Last Dose   • buPROPion XL (WELLBUTRIN XL) 150 MG 24 hr tablet Take 150 mg by mouth Daily.   2022 at 0900   • levothyroxine (SYNTHROID, LEVOTHROID) 25 MCG tablet Take 25 mcg by mouth Daily.   2022 at 0900   • mirtazapine (REMERON) 7.5 MG half tablet Take 15 mg by mouth Every  Night.   12/6/2022 at 2100   • traZODone (DESYREL) 100 MG tablet Take 100 mg by mouth Every Night.   12/6/2022 at 2100   • zolpidem (AMBIEN) 10 MG tablet Take 10 mg by mouth Every Night.   12/6/2022 at 2100       Allergies:  Percocet [oxycodone-acetaminophen]    Objective     Vital Signs   Temp:  [97.1 °F (36.2 °C)-99.3 °F (37.4 °C)] 97.8 °F (36.6 °C)  Heart Rate:  [] 108  Resp:  [14-18] 18  BP: ()/(52-82) 120/56          Physical Exam:  Constitutional: oriented to person, place, and time. appears well-developed.   Head: Normocephalic and atraumatic.   Eyes: Pupils are equal, round, and reactive to light.  No icterus or erythema  Neck: Neck supple.  Without masses or carotid bruit  Cardiovascular: Regular rhythm and normal heart sounds.  No significant lift, rub or murmur noted  Pulmonary/Chest: Effort normal and breath sounds normal. CTAB, encourage deep breathing.  Abdominal: Soft. Bowel sounds are normal to hypoactive. No significant distension. There is no rebound and no guarding.   Musculoskeletal: Normal range of motion and no edema or tenderness outside of surgical area.   Neurological: Pt is alert and oriented to person, place, and time.  normal reflexes present.  Somewhat sedated from anesthesia and pain medicine noted  Skin: Skin is warm and dry.  No new rashes of concern.    Results Review:   I reviewed the patient's new imaging results and agree with the interpretation.      Assessment & Plan       Lumbar stenosis    Hypertension    Hypothyroidism (acquired)    Drug induced constipation      Assessment: 67-year-old female with past medical history of hypertension and hypothyroidism who presents for lumbar surgery.  She underwent first stage lumbar surgery on 12/7 and is planning for second/final stage on 12/9.    Plan:    Perioperative care  Started bowel regimen, counseled to work with physical and occupational therapy, counseled on appropriate use of incentive spirometry.  Stopped IV  done fluids.    Anemia, multifactorial  Iron labs show mild iron deficiency anemia.  We will start iron supplementation.    Hypertension  Blood pressure well controlled, no changes    Acute viral syndrome  Will do flu and COVID testing.  Symptomatic therapy.    I discussed the patient's findings and my recommendations with patient nursing staff    Narciso Viveros MD  12/08/22  07:30 CST

## 2022-12-08 NOTE — PAYOR COMM NOTE
"Amanda Le (67 y.o. Female) [id number]   Admit 12/7   [facility] phone   Fax       Date of Birth   1955    Social Security Number       Address   [address] [address]    Home Phone       MRN   [id number]       Jehovah's witness   Other    Marital Status                               Admission Date   12/7/22    Admission Type   Elective    Admitting Provider   MIGUELANGEL Cabral MD    Attending Provider   MIGUELANGEL Cabral MD    Department, Room/Bed   [facility] 3A, 359/1       Discharge Date       Discharge Disposition       Discharge Destination                               Attending Provider: MIGUELANGEL Cabral MD    Allergies: Percocet [Oxycodone-acetaminophen]    Isolation: None   Infection: COVID (rule out) (12/08/22)   Code Status: CPR    Ht: 157.5 cm (62.01\")   Wt: 83.1 kg (183 lb 3.2 oz)    Admission Cmt: None   Principal Problem: Lumbar stenosis [M48.061]                 Active Insurance as of 12/7/2022     Primary Coverage     Payor Plan Insurance Group Employer/Plan Group    HUMANA MEDICARE REPLACEMENT HUMANA MEDICARE REPLACEMENT [id number]     Payor Plan Address Payor Plan Phone Number Payor Plan Fax Number Effective Dates    [address] [address] [phone]  11/1/2019 - None Entered    [facility] [id number]       Subscriber Name Subscriber Birth Date Member ID       AMANDA LE 1955 [id number]                 Emergency Contacts      (Rel.) Home Phone Work Phone Mobile Phone    Contact,No [phone] -- --    isabelle le (Spouse) [phone] -- [phone]               History & Physical      MIGUELANGEL Cabral MD at 12/07/22 0700        H&P reviewed. The patient was examined and there are no changes to the H&P.    Electronically signed by MIGUELANGEL Cabral MD at 12/07/22 0700   Source Note      [Media Unavailable] Scan on 12/7/2022 by [employer]: H&P, ORTHO " [facility], 12/01/2022          Electronically signed by [employer] at 12/01/22 1301             H&P signed by [employer] at 12/01/22 1301       [Media Unavailable] Scan on 12/7/2022 by [employer]: H&P [facility], 12/01/2022          Electronically signed by [employer] at 12/01/22 1301         Current Facility-Administered Medications   Medication Dose Route Frequency Provider Last Rate Last Admin   • acetaminophen (TYLENOL) tablet 650 mg  650 mg Oral Q6H PRN Narciso Viveros MD   650 mg at 12/07/22 2055   • benzocaine (HURRICAINE) 20 % liquid solution 1 spray  1 spray Mouth/Throat 4x Daily PRN Narciso Viveros MD       • benzocaine-menthol (CHLORASEPTIC) lozenge 1 lozenge  1 lozenge Mouth/Throat Q2H PRN Narciso Viveros MD   1 lozenge at 12/07/22 2146   • benzonatate (TESSALON) capsule 100 mg  100 mg Oral TID PRN Narciso Viveros MD       • buPROPion XL (WELLBUTRIN XL) 24 hr tablet 150 mg  150 mg Oral Daily MIGUELANGEL Cabral MD   150 mg at 12/08/22 0805   • ceFAZolin (ANCEF) IVPB 1 g  1 g Intravenous Q8H MIGUELANGEL Cabral MD   1 g at 12/07/22 2246   • dextromethorphan polistirex ER (DELSYM) 30 MG/5ML oral suspension 30 mg  30 mg Oral BID PRN Narciso Viveros MD       • diphenhydrAMINE (BENADRYL) capsule 25 mg  25 mg Oral Nightly PRN MIGUELANGEL Cabral MD       • famotidine (PEPCID) injection 20 mg  20 mg Intravenous Q12H MIGUELANGEL Cabral MD        Or   • famotidine (PEPCID) tablet 20 mg  20 mg Oral Q12H MIGUELANGEL Cabral MD   20 mg at 12/08/22 0805   • ferrous sulfate tablet 325 mg  325 mg Oral Daily With Breakfast Narciso Viveros MD   325 mg at 12/08/22 0905   • fluticasone (FLONASE) 50 MCG/ACT nasal spray 2 spray  2 spray Each Nare Daily Narciso Viveros MD   2 spray at 12/08/22 0905   • guaiFENesin (MUCINEX) 12 hr tablet 600 mg  600 mg Oral Q12H Narciso Viveros MD   600 mg at 12/08/22 0905   • HYDROcodone-acetaminophen (NORCO)  MG  per tablet 1 tablet  1 tablet Oral Q4H PRN MIGUELANGEL Cabral MD       • HYDROmorphone (DILAUDID) injection 1 mg  1 mg Intravenous Q3H PRN MIGUELANGEL Cabral MD   1 mg at 12/08/22 0800   • levothyroxine (SYNTHROID, LEVOTHROID) tablet 25 mcg  25 mcg Oral Q AM MIGUELANGEL Cabral MD   25 mcg at 12/08/22 0520   • mirtazapine (REMERON) tablet 15 mg  15 mg Oral Nightly MIGUELANGEL Cabral MD   15 mg at 12/07/22 2055   • ondansetron (ZOFRAN) tablet 4 mg  4 mg Oral Q6H PRN MIGUELANGEL Cabral MD        Or   • ondansetron (ZOFRAN) injection 4 mg  4 mg Intravenous Q6H PRN MIGUELANGEL Cabral MD       • polyethylene glycol (MIRALAX) packet 17 g  17 g Oral TID Narciso Viveros MD   17 g at 12/08/22 0804   • sennosides-docusate (PERICOLACE) 8.6-50 MG per tablet 1 tablet  1 tablet Oral BID Narciso Viveros MD   1 tablet at 12/08/22 0805   • sodium chloride 0.9 % flush 10 mL  10 mL Intravenous PRN MIGUELANGEL Cabral MD   10 mL at 12/07/22 1330   • sodium chloride 0.9 % flush 3 mL  3 mL Intravenous Q12H MIGUELANGEL Cabral MD   3 mL at 12/08/22 0805   • sodium chloride 0.9 % infusion 40 mL  40 mL Intravenous PRN MIGUELANGEL Cabral MD       • tiZANidine (ZANAFLEX) tablet 4 mg  4 mg Oral Q8H PRN MIGUELANGEL Cabral MD       • traZODone (DESYREL) tablet 100 mg  100 mg Oral Nightly MIGUELANGEL Cabral MD   100 mg at 12/07/22 2055   • zolpidem (AMBIEN) tablet 10 mg  10 mg Oral Nightly MIGUELANGEL Cabral MD   10 mg at 12/07/22 2055        Operative/Procedure Notes (last 48 hours)      MIGUELANGEL Cabral MD at 12/07/22 0650        LUMBAR ANTERIOR INTERBODY FUSION  Procedure Note    Amanda Bhandari  12/7/2022    Pre-op Diagnosis:    1. Low back pain.  2. Right buttock, thigh and leg radiculopathy.   3. Multilevel lumbar degenerative disc disease, worse L5-S1.   4. Multilevel lumbar facet arthropathy, worse right L5-S1.   5. Thoracolumbar degenerative scoliosis, concave left T11 to L4, concave right L5-S1.   6. Right-sided  foraminal stenosis L5-S1.   7. Possible right sacroiliitis.    Post-op Diagnosis:    same    Procedure/CPT® Codes:     1. Anterior discectomy decompression with bilateral neural foraminotomy L5-S1  2. Anterior lumbar interbody fusion L5-S1  3. Anterior spinal instrumentation L5-S1 (ATEC anterior plate and screws)  4. Use of PEEK interbody biomechanical device for fusion L5-S1 (Nu[employer]asive PEEK spacer and screws)  5. Use of allograft bone matrix for fusion L5-S1 ([employer] Catalyst)  6. Use of fluoroscopy for confirmation of surgical level, placement of interbody spacer and instrumentation  7. Intraoperative neural monitoring     Anesthesia: General     Surgeon: JALIL Cabral MD     Co Surgeon: Dr. Fritz Mims D.O.     Assistant: Yuri Webb PA-C     Estimated Blood Loss: 50 mL     Complications: None     Condition: Stable to PACU.     Indications:     The patient is a 67-year-old who sees Dr. Jd Watters for medical issues.  She presented to the office with low back pain along with right buttock, thigh, and leg radiculopathy.  Imaging studies revealed multilevel lumbar degenerative disc disease and facet arthropathy that was worse at L5-S1.  She was also noted to have a thoracolumbar degenerative scoliosis that was concave to the left from T11-L4 and concave to the right at L5-S1.  The degenerative changes including the facet arthropathy and the scoliosis focally concave to the right combined to create severe right-sided foraminal stenosis at the L5-S1 level that was felt to be contributing to the vast majority of her symptoms.    After failing all conservative measures, it was mutually decided that surgery would be the best option.  Risks, benefits, and complications of surgery were discussed in detail. The patient appeared well informed and wished to proceed. We specifically discussed the risk of infection, blood loss, nerve root injury, CSF leak, and the possibility of incomplete resolution of  symptoms. We also discussed the possible risk of a nonunion and the potential need for additional surgery in the event of a pseudoarthrosis or hardware failure.    We elected to proceed with a staged operation.  Today we are performing an anterior decompression and fusion of L5-S1, it is planned that we will be returning to surgery for a second posterior procedure at a later date.     Operative Procedure:     After obtaining informed consent and verifying the correct operative level, the patient was brought to the operating room and placed supine on an operating table. A general anesthetic was provided by the anesthesia service with the assistance of an endotracheal tube. Once this was appropriately positioned and secured, the anterior abdominal region was prepped and draped in usual sterile fashion. A surgical timeout was taken to confirm this was the correct patient, we were working at the correct level, and that preoperative antibiotics were given in a timely fashion.     At this point, Dr. Fritz Mims D.O. provided vascular access to the L5-S1 level. He performed a left sided anterior retroperitoneal approach to the L5-S1 segment. Please see his separate dictated operative report regarding the details on the approach itself.  When I entered the procedure, self retaining retractors were already in position with excellent exposure of the L5-S1 disc space.     After confirming we were at the correct level using fluoroscopy, I used a long handle 10 blade scalpel to cut into the L5-S1 disc space. A Colón elevator was used to remove disc material off of the endplates. Disc material was retrieved using pituitaries and Kerrisons. A disc space distractor was then placed into the L5-S1 disc space and I used curettes to remove posterior disc material. Kerrisons were used to remove posterior osteophytes across the endplates of L5 and S1. There was high-grade stenosis centrally but also foraminally especially on the right  corresponding to the focal scoliosis that was present. I then performed a bilateral neural foraminotomy with Kerrisons and curettes. The decompression was much more involved than what is usually required for an anterior lumbar interbody fusion by itself and required significantly more time to perform.  This was due to the severe disc space collapse and high-grade foraminal stenosis, again especially on the right.     After the decompression was completed bilaterally and centrally, I used a series of endplate scrapers to prepare the endplates for interbody fusion. Trial spacers were then malleted into position and it was felt that an 8 mm anterior PEEK spacer with 20 degrees lordosis from the NuVasive instrumentation set would be the best fit to restore disc height also restoring foraminal height and providing some indirect decompression. The disc space was then thoroughly irrigated with saline solution.  Gelfoam powder with thrombin was used to control epidural bleeding.     And 8 mm PEEK spacer from the NuVasive instrumentation set was then packed as tightly as possible with an allograft bone matrix called Catalyst from [employer]. This spacer was then malleted into the L5-S1 disc space under fluoroscopic guidance. It was placed as an interbody biomechanical device to assist with fusion.  I then placed screws through the spacer into both the L5 and S1 vertebral bodies to help maintain position of the spacer as well as to assist with the fusion process.     A 4-hole anterior plate from the [facility] instrumentation set was then chosen. The 4 holes were drilled and four 30 mm screws were used to fix the plate across the L5-S1 segment augmenting the fusion.      We then inspected the entire operative field for any signs of bleeding.  Bleeding was once again controlled using thrombin with Gelfoam powder and bipolar cautery.  Once we ensured that adequate hemostasis was accomplished, final fluoroscopy imaging was taken to  confirm adequate position of our implants. There was excellent restoration of disc height and the plate and screw construct appeared to be adequately positioned across L5-S1.     Please see Dr. Fritz Mims's separate dictated operative report regarding the closure of the wound. Once this was accomplished, the patient was extubated and sent to the recovery room in good stable condition. We estimated blood loss to be approximately 50 mL and the patient remained hemodynamically stable during the procedure.     Intraoperative neuro monitoring was ordered and carried out throughout the procedure to add an increased level of safety for the patient.  The interpreting physician was available by means of real-time continuous, bidirectional, remote audio and visual communication as needed throughout the entire procedure.  Modalities used during the procedure included SSEP, MEP, EMG, and TOF.  There were no neuro monitoring signal changes during the procedure.    Dr. Fritz Mims D.O. provided vascular access to the L5-S1 level and acted as a co-surgeon in this fashion.  Yuri Webb PA-C provided critical assistance during the decompression at L5-S1 as well as during the placement of the PEEK spacer, bone graft and instrumentation to obtain a fusion across L5-S1.    JALIL Cabral MD    Date: 12/7/2022  Time: 09:05 CST    Electronically signed by MIGUELANGEL Cabral MD at 12/07/22 0905     Fritz Mims DO at 12/07/22 0752        Amanda Bhandari  12/7/2022     PREOPERATIVE DIAGNOSIS:   1. Low back pain.  2. Right buttock, thigh and leg radiculopathy.   3. Multilevel lumbar degenerative disc disease, worse L5-S1.   4. Multilevel lumbar facet arthropathy, worse right L5-S1.   5. Thoracolumbar degenerative scoliosis, concave left T11 to L4, concave right L5-S1.   6. Right-sided foraminal stenosis L5-S1.   7. Possible right sacroiliitis.     POSTOPERATIVE DIAGNOSIS: same     PROCEDURE PERFORMED:   1.  Anterior  lumbar interbody fusion of L5-S1 with instrumentation     SURGEON: Fritz Mims DO   COSURGEON: Mike Cabral MD     ANESTHESIA: General.    PREPARATION: Routine.    STAFF: Circulator: Samia Ng RN; Grace Rahman RN  Physician Assistant: Yuri Webb PA-C  Scrub Person: Lyn Roach; Ara Wan; Kianna Ruiz  Vendor Representative: Eric Bailey    ESTIMATED BLOOD LOSS: 50 mL    SPECIMENS: None    COMPLICATIONS: None    INDICATIONS: Amanda Bhandari is a 67 y.o. female who you are currently following for chronic back pain.  Amanda Nobles scheduled for anterior lumbar interbody fusion of L5-S1 with Dr. Cabral on 12/7/22.  She has undergone previous abdominal surgeries including, tubal pregnancy, hysterectomy, and exploratory laparotomy.  She has history of PE in 2020, no evidence of DVT. The indications, risks, and possible complications of the procedure were explained to the patient, who voiced understanding and wished to proceed with surgery.     PROCEDURE IN DETAIL:   The patient was taken to the operating room and placed on the operating table in a supine position. After general anesthesia was obtained, the abdomen was prepped and draped in a sterile manner.  A transverse incision was then made in the left lower quadrant.  Careful dissection was made down through the subcutaneous tissues using the Bovie cautery to ensure hemostasis.  Any crossing veins were ligated with 3-0 silk suture and hemoclips.  The rectus fascia was identified.  It was incised with the Bovie cautery.  Kocher clamps are placed on each side of the rectus fascia and subfascial planes were established in a cephalad and caudad direction.  Once the subfascial planes were established the attention was then turned to the left rectus muscle.  Blunt mobilization was made of the left rectus muscle including its blood supply medially and to the right.  Once it was fully mobilized the attention was then turned  laterally to the peritoneal reflection.  Entrance into the retroperitoneal space was established with the use of a sponge stick and the Bovie cautery.  Continued blunt mobilization was made with my hand using a finger sweeping motion moving the peritoneal contents and sacral fat pad medially and to the right.  Once it was fully mobilized the Sulma-Felice retractor system was set up.  The retractor blades were set in place.  The left iliac vein was then carefully dissected free and placed safely behind the retractor blade.  The sacral vessels were carefully taken down with hemoclips.  At this point full exposure was established of the L5-S1 disc space.  The next part of the case will be dictated by Dr. Cabral. Upon completion of Dr. Cabral's part of the case the wound bed was irrigated with antibiotic saline and hemostasis was observed.  The retractor blades were carefully taken out one at a time.  The structures were then placed back in their normal anatomic positions.  The rectus fascia was then closed with a #1 PDS in a running fashion to meet in the midline.  The deep layers were closed with a 2-0 Vicryl in a running fashion.  The subcutaneous layers were closed with a 3-0 Vicryl in a running fashion.  The skin was then reapproximated using a 4-0 Monocryl in a subcuticular fashion.  The wound was then cleaned.  Sterile dressings were applied. The patient tolerated the procedure well. Sponge and needle counts were correct. The patient was then awakened and extubated in the operating room and taken to the recovery room in good condition.    Fritz Mims DO    Date: 12/7/2022 Time: 09:33 CST      Electronically signed by Fritz Mims DO at 12/07/22 0913          Consult Notes (last 24 hours)      Narciso Viveros MD at 12/08/22 8613      Consult Orders    1. Inpatient Family Practice Consult [[id number]] ordered by MIGUELANGEL Cabral MD at 12/07/22 0912                   Consult Note    Referring  Provider: Dr. Cabral  Reason for Consultation: Medical co-management    Patient Care Team:  Phil Hodges MD as PCP - General (General Practice)    Chief complaint: Back pain    Subjective .     History of present illness:      Ms. Bhandari is a 67-year-old female with past medical history of hypothyroidism and hypertension who presents for lumbar surgery.  She has a long history of low back pain unresponsive to conservative measures.  She was evaluated at the orthopedic [place] as an outpatient determined the best method of treatment was to pursue surgical fixation.  After careful consideration with Dr. Cabral, she decided to proceed with surgical intervention and underwent uncomplicated operation yesterday.    This morning she is doing well and is without complaint.  She is on oxygen briefly overnight but no longer requires it.  She is in the expected amount of postsurgical pain.  She does endorse sore throat and cough over the past couple days and think she has COVID.      REVIEW OF SYSTEMS:    CONSTITUTIONAL:  Negative for anorexia, chills, fevers, night sweats and weight loss  EYES:  negative for eye dryness, icterus and redness  HEENT:   negative for dental problems, epistaxis, facial trauma and thrush  RESPIRATORY:  negative for chest tightness, cough, dyspnea on exertion, pneumonia and sputum  CARDIOVASCULAR: negative for chest pain, dyspnea, exertional chest pressure/discomfort, irregular heart beat, palpitations, paroxysmal nocturnal dyspnea and syncope  GASTROINTESTINAL:  negative for abdominal pain, hematemesis, jaundice, melena and rectal bleeding. No significant changes in bowel habits preoperatively.   MUSCULOSKELETAL:  negative for muscle weakness, myalgias and neck pain, outside of surgical issues noted above  NEUROLOGICAL:   negative for dizziness, headaches, seizures, speech problems, tremors and vertigo  INTEGUMENT: negative for pruritus, rash, skin color change and skin  lesion(s)         History    Past Medical History:   Diagnosis Date   • Arthritis    • Disease of thyroid gland    • History of transfusion 1977 tubal pregnancy ruptured   • Hypertension    • Hypothyroidism    • Pulmonary embolism (HCC)      Past Surgical History:   Procedure Laterality Date   • ABLATION OF DYSRHYTHMIC FOCUS      Pre Daugherty's   • ANTERIOR LUMBAR EXPOSURE N/A 2022    Procedure: ANTERIOR LUMBAR EXPOSURE;  Surgeon: MIGUELANGEL Cabral MD;  Location:  [facility] OR;  Service: Orthopedic Spine;  Laterality: N/A;   • APPENDECTOMY     • DIAGNOSTIC LAPAROSCOPY EXPLORATORY LAPAROTOMY N/A     FROM HORSE INJURY   • FRACTURE SURGERY Left     ANKLE, PLATE PLACED   • HYSTERECTOMY     • JOINT REPLACEMENT Bilateral    • LUMBAR FUSION N/A 2022    Procedure: ANTERIOR DECOMPRESSION, ANTERIOR LUMBAR INTERBODY FUSION WITH INSTRUMENTATION L5-S1, WITH EXPOSURE AND CLOSURE;  Surgeon: MIGUELANGEL Cabral MD;  Location:  [facility] OR;  Service: Orthopedic Spine;  Laterality: N/A;   • SHOULDER SURGERY Left    • WRIST SURGERY       Family History   Problem Relation Age of Onset   • Cancer Mother         Ovarian   • Heart attack Mother    • Cancer Father         Esophageal cancer, prostate cancer     Social History     Tobacco Use   • Smoking status: Former     Packs/day: 1.50     Years: 15.00     Pack years: 22.50     Types: Cigarettes     Quit date: 2014     Years since quittin.9   • Smokeless tobacco: Never   Vaping Use   • Vaping Use: Never used   Substance Use Topics   • Alcohol use: Not Currently     Comment: VERY SELDOM   • Drug use: Never     Medications Prior to Admission   Medication Sig Dispense Refill Last Dose   • buPROPion XL (WELLBUTRIN XL) 150 MG 24 hr tablet Take 150 mg by mouth Daily.   2022 at 0900   • levothyroxine (SYNTHROID, LEVOTHROID) 25 MCG tablet Take 25 mcg by mouth Daily.   2022 at 0900   • mirtazapine (REMERON) 7.5 MG half tablet Take 15 mg by mouth Every Night.   2022  at 2100   • traZODone (DESYREL) 100 MG tablet Take 100 mg by mouth Every Night.   12/6/2022 at 2100   • zolpidem (AMBIEN) 10 MG tablet Take 10 mg by mouth Every Night.   12/6/2022 at 2100       Allergies:  Percocet [oxycodone-acetaminophen]    Objective     Vital Signs   Temp:  [97.1 °F (36.2 °C)-99.3 °F (37.4 °C)] 97.8 °F (36.6 °C)  Heart Rate:  [] 108  Resp:  [14-18] 18  BP: ()/(52-82) 120/56          Physical Exam:  Constitutional: oriented to person, place, and time. appears well-developed.   Head: Normocephalic and atraumatic.   Eyes: Pupils are equal, round, and reactive to light.  No icterus or erythema  Neck: Neck supple.  Without masses or carotid bruit  Cardiovascular: Regular rhythm and normal heart sounds.  No significant lift, rub or murmur noted  Pulmonary/Chest: Effort normal and breath sounds normal. CTAB, encourage deep breathing.  Abdominal: Soft. Bowel sounds are normal to hypoactive. No significant distension. There is no rebound and no guarding.   Musculoskeletal: Normal range of motion and no edema or tenderness outside of surgical area.   Neurological: Pt is alert and oriented to person, place, and time.  normal reflexes present.  Somewhat sedated from anesthesia and pain medicine noted  Skin: Skin is warm and dry.  No new rashes of concern.    Results Review:   I reviewed the patient's new imaging results and agree with the interpretation.      Assessment & Plan       Lumbar stenosis    Hypertension    Hypothyroidism (acquired)    Drug induced constipation      Assessment: 67-year-old female with past medical history of hypertension and hypothyroidism who presents for lumbar surgery.  She underwent first stage lumbar surgery on 12/7 and is planning for second/final stage on 12/9.    Plan:    Perioperative care  Started bowel regimen, counseled to work with physical and occupational therapy, counseled on appropriate use of incentive spirometry.  Stopped IV fluids.    Anemia,  multifactorial  Iron labs show mild iron deficiency anemia.  We will start iron supplementation.    Hypertension  Blood pressure well controlled, no changes    Acute viral syndrome  Will do flu and COVID testing.  Symptomatic therapy.    I discussed the patient's findings and my recommendations with patient nursing staff    Narciso Viveros MD  12/08/22  07:30 CST      Electronically signed by Narciso Viveros MD at 12/08/22 6642

## 2022-12-09 ENCOUNTER — ANESTHESIA (OUTPATIENT)
Dept: PERIOP | Facility: HOSPITAL | Age: 67
End: 2022-12-09

## 2022-12-09 ENCOUNTER — ANESTHESIA EVENT (OUTPATIENT)
Dept: PERIOP | Facility: HOSPITAL | Age: 67
End: 2022-12-09

## 2022-12-09 ENCOUNTER — APPOINTMENT (OUTPATIENT)
Dept: GENERAL RADIOLOGY | Facility: HOSPITAL | Age: 67
End: 2022-12-09

## 2022-12-09 LAB
ANION GAP SERPL CALCULATED.3IONS-SCNC: 9 MMOL/L (ref 5–15)
BASOPHILS # BLD AUTO: 0.02 10*3/MM3 (ref 0–0.2)
BASOPHILS NFR BLD AUTO: 0.2 % (ref 0–1.5)
BUN SERPL-MCNC: 10 MG/DL (ref 8–23)
BUN/CREAT SERPL: 9.4 (ref 7–25)
CALCIUM SPEC-SCNC: 8.7 MG/DL (ref 8.6–10.5)
CHLORIDE SERPL-SCNC: 104 MMOL/L (ref 98–107)
CO2 SERPL-SCNC: 25 MMOL/L (ref 22–29)
CREAT SERPL-MCNC: 1.06 MG/DL (ref 0.57–1)
DEPRECATED RDW RBC AUTO: 45.1 FL (ref 37–54)
EGFRCR SERPLBLD CKD-EPI 2021: 58.1 ML/MIN/1.73
EOSINOPHIL # BLD AUTO: 0.06 10*3/MM3 (ref 0–0.4)
EOSINOPHIL NFR BLD AUTO: 0.7 % (ref 0.3–6.2)
ERYTHROCYTE [DISTWIDTH] IN BLOOD BY AUTOMATED COUNT: 12.8 % (ref 12.3–15.4)
GLUCOSE SERPL-MCNC: 113 MG/DL (ref 65–99)
HCT VFR BLD AUTO: 30.8 % (ref 34–46.6)
HGB BLD-MCNC: 9.3 G/DL (ref 12–15.9)
IMM GRANULOCYTES # BLD AUTO: 0.04 10*3/MM3 (ref 0–0.05)
IMM GRANULOCYTES NFR BLD AUTO: 0.5 % (ref 0–0.5)
LYMPHOCYTES # BLD AUTO: 1.19 10*3/MM3 (ref 0.7–3.1)
LYMPHOCYTES NFR BLD AUTO: 14.7 % (ref 19.6–45.3)
MCH RBC QN AUTO: 29.2 PG (ref 26.6–33)
MCHC RBC AUTO-ENTMCNC: 30.2 G/DL (ref 31.5–35.7)
MCV RBC AUTO: 96.6 FL (ref 79–97)
MONOCYTES # BLD AUTO: 0.51 10*3/MM3 (ref 0.1–0.9)
MONOCYTES NFR BLD AUTO: 6.3 % (ref 5–12)
NEUTROPHILS NFR BLD AUTO: 6.25 10*3/MM3 (ref 1.7–7)
NEUTROPHILS NFR BLD AUTO: 77.6 % (ref 42.7–76)
NRBC BLD AUTO-RTO: 0 /100 WBC (ref 0–0.2)
PLATELET # BLD AUTO: 227 10*3/MM3 (ref 140–450)
PMV BLD AUTO: 9.8 FL (ref 6–12)
POTASSIUM SERPL-SCNC: 3.9 MMOL/L (ref 3.5–5.2)
RBC # BLD AUTO: 3.19 10*6/MM3 (ref 3.77–5.28)
SODIUM SERPL-SCNC: 138 MMOL/L (ref 136–145)
WBC NRBC COR # BLD: 8.07 10*3/MM3 (ref 3.4–10.8)

## 2022-12-09 PROCEDURE — C1713 ANCHOR/SCREW BN/BN,TIS/BN: HCPCS | Performed by: ORTHOPAEDIC SURGERY

## 2022-12-09 PROCEDURE — 25010000002 CEFAZOLIN 1-4 GM/50ML-% SOLUTION: Performed by: PHYSICIAN ASSISTANT

## 2022-12-09 PROCEDURE — 0SG3071 FUSION OF LUMBOSACRAL JOINT WITH AUTOLOGOUS TISSUE SUBSTITUTE, POSTERIOR APPROACH, POSTERIOR COLUMN, OPEN APPROACH: ICD-10-PCS | Performed by: ORTHOPAEDIC SURGERY

## 2022-12-09 PROCEDURE — 0 BUPIVACAINE LIPOSOME 1.3 % SUSPENSION 20 ML VIAL: Performed by: ORTHOPAEDIC SURGERY

## 2022-12-09 PROCEDURE — 25010000002 FENTANYL CITRATE (PF) 50 MCG/ML SOLUTION: Performed by: ANESTHESIOLOGY

## 2022-12-09 PROCEDURE — C1769 GUIDE WIRE: HCPCS | Performed by: ORTHOPAEDIC SURGERY

## 2022-12-09 PROCEDURE — 25010000002 PROPOFOL 10 MG/ML EMULSION: Performed by: NURSE ANESTHETIST, CERTIFIED REGISTERED

## 2022-12-09 PROCEDURE — 25010000002 ONDANSETRON PER 1 MG: Performed by: NURSE ANESTHETIST, CERTIFIED REGISTERED

## 2022-12-09 PROCEDURE — C9290 INJ, BUPIVACAINE LIPOSOME: HCPCS | Performed by: ORTHOPAEDIC SURGERY

## 2022-12-09 PROCEDURE — 76000 FLUOROSCOPY <1 HR PHYS/QHP: CPT

## 2022-12-09 PROCEDURE — 25010000002 DEXAMETHASONE PER 1 MG: Performed by: NURSE ANESTHETIST, CERTIFIED REGISTERED

## 2022-12-09 PROCEDURE — 25010000002 FENTANYL CITRATE (PF) 250 MCG/5ML SOLUTION: Performed by: NURSE ANESTHETIST, CERTIFIED REGISTERED

## 2022-12-09 PROCEDURE — 80048 BASIC METABOLIC PNL TOTAL CA: CPT | Performed by: STUDENT IN AN ORGANIZED HEALTH CARE EDUCATION/TRAINING PROGRAM

## 2022-12-09 PROCEDURE — 72100 X-RAY EXAM L-S SPINE 2/3 VWS: CPT

## 2022-12-09 PROCEDURE — 25010000002 CEFAZOLIN PER 500 MG: Performed by: PHYSICIAN ASSISTANT

## 2022-12-09 PROCEDURE — 97165 OT EVAL LOW COMPLEX 30 MIN: CPT | Performed by: OCCUPATIONAL THERAPIST

## 2022-12-09 PROCEDURE — 85025 COMPLETE CBC W/AUTO DIFF WBC: CPT | Performed by: STUDENT IN AN ORGANIZED HEALTH CARE EDUCATION/TRAINING PROGRAM

## 2022-12-09 DEVICE — CANNULATED EXTENDED TAB POLYAXIAL REDUCTION SCREW, 6.5 MM X 45 MM
Type: IMPLANTABLE DEVICE | Site: SPINE LUMBAR | Status: FUNCTIONAL
Brand: INVICTUS

## 2022-12-09 DEVICE — TI MIS LORDOTIC ROD, 5.5 MM X 35 MM
Type: IMPLANTABLE DEVICE | Site: SPINE LUMBAR | Status: FUNCTIONAL
Brand: INVICTUS

## 2022-12-09 DEVICE — SET SCREW
Type: IMPLANTABLE DEVICE | Site: SPINE LUMBAR | Status: FUNCTIONAL
Brand: INVICTUS

## 2022-12-09 RX ORDER — PROPOFOL 10 MG/ML
VIAL (ML) INTRAVENOUS AS NEEDED
Status: DISCONTINUED | OUTPATIENT
Start: 2022-12-09 | End: 2022-12-09 | Stop reason: SURG

## 2022-12-09 RX ORDER — DROPERIDOL 2.5 MG/ML
0.62 INJECTION, SOLUTION INTRAMUSCULAR; INTRAVENOUS ONCE AS NEEDED
Status: DISCONTINUED | OUTPATIENT
Start: 2022-12-09 | End: 2022-12-09 | Stop reason: HOSPADM

## 2022-12-09 RX ORDER — FENTANYL CITRATE 50 UG/ML
INJECTION, SOLUTION INTRAMUSCULAR; INTRAVENOUS AS NEEDED
Status: DISCONTINUED | OUTPATIENT
Start: 2022-12-09 | End: 2022-12-09 | Stop reason: SURG

## 2022-12-09 RX ORDER — DEXAMETHASONE SODIUM PHOSPHATE 4 MG/ML
INJECTION, SOLUTION INTRA-ARTICULAR; INTRALESIONAL; INTRAMUSCULAR; INTRAVENOUS; SOFT TISSUE AS NEEDED
Status: DISCONTINUED | OUTPATIENT
Start: 2022-12-09 | End: 2022-12-09 | Stop reason: SURG

## 2022-12-09 RX ORDER — SODIUM CHLORIDE 0.9 % (FLUSH) 0.9 %
3 SYRINGE (ML) INJECTION EVERY 12 HOURS SCHEDULED
Status: DISCONTINUED | OUTPATIENT
Start: 2022-12-09 | End: 2022-12-09 | Stop reason: HOSPADM

## 2022-12-09 RX ORDER — BUPIVACAINE HCL/0.9 % NACL/PF 0.125 %
PLASTIC BAG, INJECTION (ML) EPIDURAL AS NEEDED
Status: DISCONTINUED | OUTPATIENT
Start: 2022-12-09 | End: 2022-12-09 | Stop reason: SURG

## 2022-12-09 RX ORDER — SODIUM CHLORIDE 9 MG/ML
40 INJECTION, SOLUTION INTRAVENOUS AS NEEDED
Status: DISCONTINUED | OUTPATIENT
Start: 2022-12-09 | End: 2022-12-09 | Stop reason: HOSPADM

## 2022-12-09 RX ORDER — HYDROCODONE BITARTRATE AND ACETAMINOPHEN 5; 325 MG/1; MG/1
1 TABLET ORAL ONCE AS NEEDED
Status: COMPLETED | OUTPATIENT
Start: 2022-12-09 | End: 2022-12-09

## 2022-12-09 RX ORDER — LIDOCAINE HYDROCHLORIDE 10 MG/ML
0.5 INJECTION, SOLUTION EPIDURAL; INFILTRATION; INTRACAUDAL; PERINEURAL ONCE AS NEEDED
Status: DISCONTINUED | OUTPATIENT
Start: 2022-12-09 | End: 2022-12-09 | Stop reason: HOSPADM

## 2022-12-09 RX ORDER — MAGNESIUM HYDROXIDE 1200 MG/15ML
LIQUID ORAL AS NEEDED
Status: DISCONTINUED | OUTPATIENT
Start: 2022-12-09 | End: 2022-12-09 | Stop reason: HOSPADM

## 2022-12-09 RX ORDER — NEOSTIGMINE METHYLSULFATE 5 MG/5 ML
SYRINGE (ML) INTRAVENOUS AS NEEDED
Status: DISCONTINUED | OUTPATIENT
Start: 2022-12-09 | End: 2022-12-09 | Stop reason: SURG

## 2022-12-09 RX ORDER — ONDANSETRON 2 MG/ML
INJECTION INTRAMUSCULAR; INTRAVENOUS AS NEEDED
Status: DISCONTINUED | OUTPATIENT
Start: 2022-12-09 | End: 2022-12-09 | Stop reason: SURG

## 2022-12-09 RX ORDER — SODIUM CHLORIDE 9 MG/ML
75 INJECTION, SOLUTION INTRAVENOUS CONTINUOUS
Status: DISPENSED | OUTPATIENT
Start: 2022-12-09 | End: 2022-12-10

## 2022-12-09 RX ORDER — FENTANYL CITRATE 50 UG/ML
25 INJECTION, SOLUTION INTRAMUSCULAR; INTRAVENOUS
Status: DISCONTINUED | OUTPATIENT
Start: 2022-12-09 | End: 2022-12-09 | Stop reason: HOSPADM

## 2022-12-09 RX ORDER — FLUMAZENIL 0.1 MG/ML
0.2 INJECTION INTRAVENOUS AS NEEDED
Status: DISCONTINUED | OUTPATIENT
Start: 2022-12-09 | End: 2022-12-09 | Stop reason: HOSPADM

## 2022-12-09 RX ORDER — SODIUM CHLORIDE, SODIUM LACTATE, POTASSIUM CHLORIDE, CALCIUM CHLORIDE 600; 310; 30; 20 MG/100ML; MG/100ML; MG/100ML; MG/100ML
100 INJECTION, SOLUTION INTRAVENOUS CONTINUOUS
Status: DISCONTINUED | OUTPATIENT
Start: 2022-12-09 | End: 2022-12-09 | Stop reason: HOSPADM

## 2022-12-09 RX ORDER — HYDROMORPHONE HYDROCHLORIDE 1 MG/ML
0.5 INJECTION, SOLUTION INTRAMUSCULAR; INTRAVENOUS; SUBCUTANEOUS
Status: DISCONTINUED | OUTPATIENT
Start: 2022-12-09 | End: 2022-12-09 | Stop reason: HOSPADM

## 2022-12-09 RX ORDER — MIDAZOLAM HYDROCHLORIDE 1 MG/ML
0.5 INJECTION INTRAMUSCULAR; INTRAVENOUS
Status: DISCONTINUED | OUTPATIENT
Start: 2022-12-09 | End: 2022-12-09 | Stop reason: HOSPADM

## 2022-12-09 RX ORDER — ONDANSETRON 2 MG/ML
4 INJECTION INTRAMUSCULAR; INTRAVENOUS
Status: DISCONTINUED | OUTPATIENT
Start: 2022-12-09 | End: 2022-12-09 | Stop reason: HOSPADM

## 2022-12-09 RX ORDER — ROCURONIUM BROMIDE 10 MG/ML
INJECTION, SOLUTION INTRAVENOUS AS NEEDED
Status: DISCONTINUED | OUTPATIENT
Start: 2022-12-09 | End: 2022-12-09 | Stop reason: SURG

## 2022-12-09 RX ORDER — BUPIVACAINE HCL/0.9 % NACL/PF 0.125 %
PLASTIC BAG, INJECTION (ML) EPIDURAL AS NEEDED
Status: DISCONTINUED | OUTPATIENT
Start: 2022-12-09 | End: 2022-12-09

## 2022-12-09 RX ORDER — NALOXONE HCL 0.4 MG/ML
0.04 VIAL (ML) INJECTION AS NEEDED
Status: DISCONTINUED | OUTPATIENT
Start: 2022-12-09 | End: 2022-12-09 | Stop reason: HOSPADM

## 2022-12-09 RX ORDER — SODIUM CHLORIDE 0.9 % (FLUSH) 0.9 %
3-10 SYRINGE (ML) INJECTION AS NEEDED
Status: DISCONTINUED | OUTPATIENT
Start: 2022-12-09 | End: 2022-12-09 | Stop reason: HOSPADM

## 2022-12-09 RX ORDER — IBUPROFEN 600 MG/1
600 TABLET ORAL ONCE AS NEEDED
Status: DISCONTINUED | OUTPATIENT
Start: 2022-12-09 | End: 2022-12-09 | Stop reason: HOSPADM

## 2022-12-09 RX ORDER — LIDOCAINE HYDROCHLORIDE 20 MG/ML
INJECTION, SOLUTION EPIDURAL; INFILTRATION; INTRACAUDAL; PERINEURAL AS NEEDED
Status: DISCONTINUED | OUTPATIENT
Start: 2022-12-09 | End: 2022-12-09 | Stop reason: SURG

## 2022-12-09 RX ORDER — LABETALOL HYDROCHLORIDE 5 MG/ML
5 INJECTION, SOLUTION INTRAVENOUS
Status: DISCONTINUED | OUTPATIENT
Start: 2022-12-09 | End: 2022-12-09 | Stop reason: HOSPADM

## 2022-12-09 RX ORDER — CEFAZOLIN SODIUM 1 G/50ML
1 INJECTION, SOLUTION INTRAVENOUS EVERY 8 HOURS
Status: COMPLETED | OUTPATIENT
Start: 2022-12-09 | End: 2022-12-10

## 2022-12-09 RX ORDER — SODIUM CHLORIDE 0.9 % (FLUSH) 0.9 %
10 SYRINGE (ML) INJECTION AS NEEDED
Status: DISCONTINUED | OUTPATIENT
Start: 2022-12-09 | End: 2022-12-09 | Stop reason: HOSPADM

## 2022-12-09 RX ORDER — SODIUM CHLORIDE, SODIUM LACTATE, POTASSIUM CHLORIDE, CALCIUM CHLORIDE 600; 310; 30; 20 MG/100ML; MG/100ML; MG/100ML; MG/100ML
1000 INJECTION, SOLUTION INTRAVENOUS CONTINUOUS
Status: DISCONTINUED | OUTPATIENT
Start: 2022-12-09 | End: 2022-12-09 | Stop reason: HOSPADM

## 2022-12-09 RX ADMIN — MIRTAZAPINE 15 MG: 15 TABLET, FILM COATED ORAL at 20:58

## 2022-12-09 RX ADMIN — PROPOFOL 150 MG: 10 INJECTION, EMULSION INTRAVENOUS at 08:58

## 2022-12-09 RX ADMIN — ROCURONIUM BROMIDE 50 MG: 10 INJECTION, SOLUTION INTRAVENOUS at 08:58

## 2022-12-09 RX ADMIN — HYDROCODONE BITARTRATE AND ACETAMINOPHEN 1 TABLET: 10; 325 TABLET ORAL at 18:31

## 2022-12-09 RX ADMIN — ONDANSETRON 4 MG: 2 INJECTION INTRAMUSCULAR; INTRAVENOUS at 09:56

## 2022-12-09 RX ADMIN — Medication 2 G: at 09:05

## 2022-12-09 RX ADMIN — ZOLPIDEM TARTRATE 10 MG: 5 TABLET ORAL at 20:56

## 2022-12-09 RX ADMIN — POLYETHYLENE GLYCOL 3350 17 G: 17 POWDER, FOR SOLUTION ORAL at 20:58

## 2022-12-09 RX ADMIN — DEXAMETHASONE SODIUM PHOSPHATE 4 MG: 4 INJECTION, SOLUTION INTRA-ARTICULAR; INTRALESIONAL; INTRAMUSCULAR; INTRAVENOUS; SOFT TISSUE at 09:22

## 2022-12-09 RX ADMIN — GLYCOPYRROLATE 0.4 MG: 0.2 INJECTION INTRAMUSCULAR; INTRAVENOUS at 10:04

## 2022-12-09 RX ADMIN — CEFAZOLIN SODIUM 1 G: 1 INJECTION, SOLUTION INTRAVENOUS at 17:41

## 2022-12-09 RX ADMIN — LIDOCAINE HYDROCHLORIDE 100 MG: 20 INJECTION, SOLUTION EPIDURAL; INFILTRATION; INTRACAUDAL; PERINEURAL at 08:58

## 2022-12-09 RX ADMIN — HYDROCODONE BITARTRATE AND ACETAMINOPHEN 1 TABLET: 5; 325 TABLET ORAL at 11:00

## 2022-12-09 RX ADMIN — FENTANYL CITRATE 100 MCG: 50 INJECTION, SOLUTION INTRAMUSCULAR; INTRAVENOUS at 09:33

## 2022-12-09 RX ADMIN — GUAIFENESIN 600 MG: 600 TABLET, EXTENDED RELEASE ORAL at 20:58

## 2022-12-09 RX ADMIN — Medication 3 MG: at 10:04

## 2022-12-09 RX ADMIN — Medication 10 ML: at 12:06

## 2022-12-09 RX ADMIN — SODIUM CHLORIDE, POTASSIUM CHLORIDE, SODIUM LACTATE AND CALCIUM CHLORIDE 1000 ML: 600; 310; 30; 20 INJECTION, SOLUTION INTRAVENOUS at 07:48

## 2022-12-09 RX ADMIN — FAMOTIDINE 20 MG: 20 TABLET, FILM COATED ORAL at 12:04

## 2022-12-09 RX ADMIN — SODIUM CHLORIDE 75 ML/HR: 9 INJECTION, SOLUTION INTRAVENOUS at 12:03

## 2022-12-09 RX ADMIN — FENTANYL CITRATE 25 MCG: 50 INJECTION INTRAMUSCULAR; INTRAVENOUS at 10:46

## 2022-12-09 RX ADMIN — SODIUM CHLORIDE, POTASSIUM CHLORIDE, SODIUM LACTATE AND CALCIUM CHLORIDE 100 ML/HR: 600; 310; 30; 20 INJECTION, SOLUTION INTRAVENOUS at 07:46

## 2022-12-09 RX ADMIN — FAMOTIDINE 20 MG: 20 TABLET, FILM COATED ORAL at 20:57

## 2022-12-09 RX ADMIN — FENTANYL CITRATE 150 MCG: 50 INJECTION, SOLUTION INTRAMUSCULAR; INTRAVENOUS at 08:58

## 2022-12-09 RX ADMIN — DOCUSATE SODIUM 50 MG AND SENNOSIDES 8.6 MG 1 TABLET: 8.6; 5 TABLET, FILM COATED ORAL at 20:58

## 2022-12-09 RX ADMIN — Medication 200 MCG: at 09:51

## 2022-12-09 RX ADMIN — TRAZODONE HYDROCHLORIDE 50 MG: 50 TABLET ORAL at 20:57

## 2022-12-09 RX ADMIN — POLYETHYLENE GLYCOL 3350 17 G: 17 POWDER, FOR SOLUTION ORAL at 17:42

## 2022-12-09 RX ADMIN — Medication 3 ML: at 20:58

## 2022-12-09 RX ADMIN — Medication 200 MCG: at 09:48

## 2022-12-09 NOTE — ANESTHESIA PREPROCEDURE EVALUATION
Anesthesia Evaluation     Patient summary reviewed   no history of anesthetic complications:  NPO Solid Status: > 8 hours  NPO Liquid Status: > 8 hours           Airway   Mallampati: II  TM distance: >3 FB  Dental      Comment: veneers    Pulmonary    (+) pulmonary embolism (2020),   (-) asthma, sleep apnea  Cardiovascular   Exercise tolerance: good (4-7 METS)    ECG reviewed    (-) hypertension (no meds), past MI, CAD, dysrhythmias, cardiac stents, hyperlipidemia (no meds)      Neuro/Psych  (-) seizures, TIA, CVA  GI/Hepatic/Renal/Endo    (+) obesity,   thyroid problem hypothyroidism  (-) liver disease, no renal disease (bumped creatinine ), diabetes    Musculoskeletal     (+) back pain, chronic pain,   Abdominal    Substance History      OB/GYN          Other   arthritis, blood dyscrasia anemia,                     Anesthesia Plan    ASA 2     general     intravenous induction     Anesthetic plan, risks, benefits, and alternatives have been provided, discussed and informed consent has been obtained with: patient.        CODE STATUS:

## 2022-12-09 NOTE — OP NOTE
Posterior lumbar fusion with instrumentation procedure Note    Amanda Bhandari  12/9/2022    Pre-op Diagnosis:     1. Low back pain.  2. Right buttock, thigh and leg radiculopathy.   3. Multilevel lumbar degenerative disc disease, worse L5-S1.   4. Multilevel lumbar facet arthropathy, worse right L5-S1.   5. Thoracolumbar degenerative scoliosis, concave left T11 to L4, concave right L5-S1.   6. Right-sided foraminal stenosis L5-S1.   7. Possible right sacroiliitis.  8.  Status post anterior decompression, ALIF with instrumentation L5-S1, 12/7/2022    Post-op Diagnosis:    same    Procedure/CPT® Codes:    1.  Posterior spinal fusion L5-S1  2.  Posterior spinal instrumentation L5-S1 (ATEC pedicle screws and rods)  3.  Use of locally obtained autograft bone for fusion  4.  Use of fluoroscopy for confirmation of surgical level and placement of instrumentation  5.  Intraoperative neural monitoring with pedicle screw stimulation    Anesthesia: General    Surgeon: JALIL Cabral MD    Assistant: Yuri Webb PA-C    Estimated Blood Loss: minimal    Complications: None    Condition: Stable to PACU.    Indications:    The patient is a 67-year-old who sees Dr. Jd Watters for medical issues.  She presented to the office with low back pain along with right buttock, thigh, and leg radiculopathy.  Imaging studies revealed multilevel lumbar degenerative disc disease and facet arthropathy that was worse at L5-S1.  She was also noted to have a thoracolumbar degenerative scoliosis that was concave to the left from T11-L4 and concave to the right at L5-S1.  The degenerative changes including the facet arthropathy and the scoliosis focally concave to the right combined to create severe right-sided foraminal stenosis at the L5-S1 level that was felt to be contributing to the vast majority of her symptoms.     After failing all conservative measures, it was mutually decided that surgery would be the best option. Risks, benefits,  and complications of surgery were discussed in detail. The patient appeared well informed and wished to proceed. We specifically discussed the risk of infection, blood loss, nerve root injury, CSF leak, and the possibility of incomplete resolution of symptoms. We also discussed the possible risk of a nonunion and the potential need for additional surgery in the event of a pseudoarthrosis or hardware failure.      We elected proceed with a staged operation.  Previously on 12/7/2022, the patient underwent an anterior decompression and fusion with instrumentation of L5-S1.  Today we return to surgery for the second posterior stage of the procedure.     Operative Procedure:    After obtaining informed consent and verifying the correct operative site, the patient was brought to the operating room. A general anesthetic was provided by the anesthesia service with the assistance of an endotracheal tube. Once this was appropriately positioned and secured, the patient was carefully rotated prone onto a Dominguez frame. All bony processes were well-padded. The lumbar region was prepped and draped in usual sterile fashion. A surgical timeout was taken to confirm this was the correct patient, we were working at the correct level, and that preoperative antibiotics were given in a timely fashion.    Using fluoroscopy for guidance, bilateral [place] incisions were created overlying the L5-S1 segment using a 10 blade scalpel.  Dissection was carried through subcutaneous tissues using Bovie cautery.  The lumbar fascia was divided in line with the incisions and blunt dissection was carried between the multifidus and the longissimus down to the facet joints of L5-S1.  Dissection was carried laterally exposing the transverse processes of L5 and the sacral ala.  We then exposed the L5-S1 facet joints.  The capsules were destroyed using Bovie cautery exposing as much bone as possible.  The high-speed bur was then used to decorticate the  facet joints, destroying as much of the facet cartilage as possible.  I also decorticated the lateral pars region and the tranverse processes.  The locally obtained autograft bone was left in situ in the posterolateral gutter.  This constituted a posterior fusion of L5-S1.    Next under fluoroscopic guidance, Jamshidi needles were used to cannulate the pedicles of L5 and S1 bilaterally.  Once the needles were properly positioned in the pedicles, guidewires were placed to maintain position.  Over each of the guidewires, an [facility] pedicle screw was placed.  We used 6.5 mm x 45 mm screws into each of the pedicles.  I then used a neuro monitoring probe to stimulate the screws themselves confirming appropriate position ensuring no breach of the pedicle.  After confirming the screws were properly positioned, an appropriate-sized deepak was chosen to span the pedicle screws on each side.  The rods were tightened into position using set screws.  The setscrews were tightened using the appropriate antitorque wrench and torque limiting screwdriver provided by [facility].    The wounds were then thoroughly irrigated and an inspection was then undertaken to ensure that we had adequate hemostasis.  Bleeding at this point was controlled using thrombin with Gelfoam powder and bipolar cautery.  Final fluoroscopy imaging confirmed adequate position of the newly placed posterior instrumentation.    Wound closure was then accomplished by reapproximating the fascia with a #1 Vicryl.  Immediate subcutaneous tissues were closed using a 2-0 Vicryl and skin closure was augmented using Mastisol and Steri-Strips.  The incisions were then washed and sterilely dressed with a Bioclusive dressing.    The patient was then carefully rotated supine onto a hospital gurKamiah, extubated, and sent to the recovery room in good stable condition.  The patient tolerated the procedure well.  There were no complications.  We estimated blood loss to be  minimal.    Intraoperative neuro monitoring was ordered and carried out throughout the procedure to add an increased level of safety for the patient.  The interpreting physician was available by means of real-time continuous, bidirectional, remote audio and visual communication as needed throughout the entire procedure.  Modalities used during the procedure included SSEP, EMG, TOF, and pedicle screw stimulation.  There were no neuro monitoring signal changes during the procedure.    Yuri Webb PA-C provided critical assistance during the procedure.  His assistance was medically necessary in order to allow the procedure to occur in the most safe and efficient manner.    JALIL Cabral MD     Date: 12/9/2022  Time: 10:02 CST

## 2022-12-09 NOTE — THERAPY EVALUATION
Patient Name: Amanda Bhandari  : 1955    MRN: [id number]                              Today's Date: 2022       Admit Date: 2022    Visit Dx: No diagnosis found.  Patient Active Problem List   Diagnosis   • Hypertension   • Lumbar stenosis   • Hypothyroidism (acquired)   • Drug induced constipation     Past Medical History:   Diagnosis Date   • Arthritis    • Disease of thyroid gland    • History of transfusion 1977 tubal pregnancy ruptured   • Hypertension    • Hypothyroidism    • Pulmonary embolism (HCC)      Past Surgical History:   Procedure Laterality Date   • ABLATION OF DYSRHYTHMIC FOCUS      Pre Daugherty's   • ANTERIOR LUMBAR EXPOSURE N/A 2022    Procedure: ANTERIOR LUMBAR EXPOSURE;  Surgeon: MIGUELANGEL Cabral MD;  Location:  [facility] OR;  Service: Orthopedic Spine;  Laterality: N/A;   • APPENDECTOMY     • DIAGNOSTIC LAPAROSCOPY EXPLORATORY LAPAROTOMY N/A     FROM HORSE INJURY   • FRACTURE SURGERY Left     ANKLE, PLATE PLACED   • HYSTERECTOMY     • JOINT REPLACEMENT Bilateral    • LUMBAR FUSION N/A 2022    Procedure: ANTERIOR DECOMPRESSION, ANTERIOR LUMBAR INTERBODY FUSION WITH INSTRUMENTATION L5-S1, WITH EXPOSURE AND CLOSURE;  Surgeon: MIGUELANGEL Cabral MD;  Location: [facility] OR;  Service: Orthopedic Spine;  Laterality: N/A;   • SHOULDER SURGERY Left    • WRIST SURGERY        General Information     Row Name 22 1526          OT Time and Intention    Document Type evaluation  -CH     Mode of Treatment occupational therapy  -     Row Name 22 1526          General Information    Patient Profile Reviewed yes  -CH     Prior Level of Function independent:;ADL's  -CH     Existing Precautions/Restrictions fall;LSO;brace worn when out of bed;spinal  -CH     Barriers to Rehab medically complex  -     Row Name 22 1526          Occupational Profile    Reason for Services/Referral (Occupational Profile) Posterior spinal fusion L5-S1, Posterior spinal instrumentation  L5-S1, Anterior lumbar interbody fusion of L5-S1 with instrumentation  -     Environmental Supports and Barriers (Occupational Profile) tub shower combo  -     Row Name 12/09/22 1526          Living Environment    People in Home spouse  -     Row Name 12/09/22 1526          Home Main Entrance    Number of Stairs, Main Entrance three  -     Row Name 12/09/22 1526          Stairs Within Home, Primary    Number of Stairs, Within Home, Primary none  -     Stair Railings, Within Home, Primary none  -     Row Name 12/09/22 1526          Cognition    Orientation Status (Cognition) oriented x 4  -     Row Name 12/09/22 1526          Safety Issues, Functional Mobility    Safety Issues Affecting Function (Mobility) impulsivity  -     Impairments Affecting Function (Mobility) balance;strength;pain  -           User Key  (r) = Recorded By, (t) = Taken By, (c) = Cosigned By    Initials Name Provider Type     Arabella Altman, OTR/L Occupational Therapist                 Mobility/ADL's     Row Name 12/09/22 1600          Bed Mobility    Bed Mobility rolling left;rolling right;sidelying-sit;sit-sidelying  -     Rolling Left [place] (Bed Mobility) minimum assist (75% patient effort);verbal cues;nonverbal cues (demo/gesture)  -     Rolling Right [place] (Bed Mobility) minimum assist (75% patient effort);verbal cues;nonverbal cues (demo/gesture)  -     Sidelying-Sit [place] (Bed Mobility) moderate assist (50% patient effort);minimum assist (75% patient effort)  -     Sit-Sidelying [place] (Bed Mobility) minimum assist (75% patient effort)  -     Row Name 12/09/22 1600          Transfers    Transfers sit-stand transfer;stand-sit transfer  -     Row Name 12/09/22 1600          Sit-Stand Transfer    Sit-Stand [place] (Transfers) minimum assist (75% patient effort);contact guard;verbal cues;nonverbal cues (demo/gesture)  -     Row Name 12/09/22 1600          Stand-Sit Transfer     Stand-Sit [place] (Transfers) contact guard;verbal cues;nonverbal cues (demo/gesture)  -     Row Name 12/09/22 1600          Functional Mobility    Functional Mobility- Ind. Level minimum assist (75% patient effort)  -     Functional Mobility- Comment side steps up to HOB  -     Row Name 12/09/22 1600          Activities of Daily Living    BADL Assessment/Intervention upper body dressing  -     Row Name 12/09/22 1600          Upper Body Dressing Assessment/Training    [place] Level (Upper Body Dressing) don;doff;maximum assist (25% patient effort)  -     Position (Upper Body Dressing) edge of bed sitting  -     Comment, (Upper Body Dressing) LSO  -           User Key  (r) = Recorded By, (t) = Taken By, (c) = Cosigned By    Initials Name Provider Type    Arabella Aguirre, OTR/L Occupational Therapist               Obj/Interventions     Row Name 12/09/22 1526          Vision Assessment/Intervention    Visual Impairment/Limitations WFL  -     Row Name 12/09/22 1526          Range of Motion Comprehensive    General Range of Motion bilateral upper extremity ROM WFL  -     Row Name 12/09/22 1526          Balance    Balance Assessment sitting static balance;sitting dynamic balance;standing static balance;standing dynamic balance;sit to stand dynamic balance  -     Static Sitting Balance supervision  -     Dynamic Sitting Balance contact guard  -     Position, Sitting Balance sitting edge of bed  -     Sit to Stand Dynamic Balance minimal assist  -     Static Standing Balance contact guard;minimal assist  -     Dynamic Standing Balance contact guard;minimal assist  -     Position/Device Used, Standing Balance supported  -     Balance Interventions sitting;standing;sit to stand;static;supported;dynamic  -           User Key  (r) = Recorded By, (t) = Taken By, (c) = Cosigned By    Initials Name Provider Type    Arabella Aguirre, OTR/L Occupational Therapist                Goals/Plan     Row Name 12/09/22 1526          Dressing Goal 1 (OT)    Activity/Device (Dressing Goal 1, OT) upper body dressing  -CH     [place]/Cues Needed (Dressing Goal 1, OT) set-up required  -CH     Time Frame (Dressing Goal 1, OT) long term goal (LTG);by discharge  -     Progress/Outcome (Dressing Goal 1, OT) new goal  -[facility] Name 12/09/22 1526          Toileting Goal 1 (OT)    Activity/Device (Toileting Goal 1, OT) toileting skills, all;commode, 3-in-1;commode  -CH     [place] Level/Cues Needed (Toileting Goal 1, OT) contact guard required  -CH     Time Frame (Toileting Goal 1, OT) long term goal (LTG);by discharge  -     Progress/Outcome (Toileting Goal 1, OT) new [facility]  -[facility] Name 12/09/22 1526          Problem Specific Goal 1 (OT)    Problem Specific Goal 1 (OT) Pt. will report 3/3 spinal precautions  -CH     Time Frame (Problem Specific Goal 1, OT) long term goal (LTG);by discharge  -     Progress/Outcome (Problem Specific Goal 1, OT) new [facility] Name 12/09/22 1526          Therapy Assessment/Plan (OT)    Planned Therapy Interventions (OT) activity tolerance training;adaptive equipment training;BADL retraining;functional balance retraining;patient/caregiver education/training;occupation/activity based interventions;strengthening exercise;transfer/mobility retraining;orthotic fabrication/fitting/training  -           User Key  (r) = Recorded By, (t) = Taken By, (c) = Cosigned By    Initials Name Provider Type     Arabella Altman, OTR/L Occupational Therapist               Clinical Impression     Row Name 12/09/22 1526          Pain Assessment    Pretreatment Pain Rating 4/10  -CH     Posttreatment Pain Rating 4/10  -CH     Pain Intervention(s) Medication (See MAR);Repositioned;Ambulation/increased activity  -     Row Name 12/09/22 1526          Plan of Care Review    Plan of Care Reviewed With patient  -     Progress no change  -     Outcome Evaluation OT eval  complete.  Upon entering room pt in bed wearing LSO she is alert but drowsy.  OTR edu'd pt in spinal precuations & LSO wearing schedule.  Ms. Bhandari required Min A for bed mob, Max A for LSO mgmt, & Min A to stand and take steps.  Ms Thony mullen's weakness and pain but with increased reps she improves indep.  She would benefit from cont'd OT tx to improve her indep in self care & fxl mob anticipate DC to subacute rehab vs home with assist & HH progress pending  -     Row Name 12/09/22 1526          Therapy Assessment/Plan (OT)    Patient/Family Therapy Goal Statement (OT) improve fxn  -     Rehab Potential (OT) good, to achieve stated therapy goals  -     Criteria for Skilled Therapeutic Interventions Met (OT) yes;skilled treatment is necessary  -     Therapy Frequency (OT) 5 times/wk  -     Predicted Duration of Therapy Intervention (OT) 10 days  -     Row Name 12/09/22 1526          Therapy Plan Review/Discharge Plan (OT)    Anticipated Discharge Disposition (OT) home with /7 care;home with home health;skilled nursing facility;sub acute care setting  -     Row Name 12/09/22 1526          Positioning and Restraints    Pre-Treatment Position in bed  -     Post Treatment Position bed  -     In Bed side lying right;fowlers;call light within reach;encouraged to call for assist;side rails up x2;pillow between legs  -           User Key  (r) = Recorded By, (t) = Taken By, (c) = Cosigned By    Initials Name Provider Type     Arabella Altman, OTR/L Occupational Therapist               Outcome Measures     Row Name 12/09/22 1526          How much help from another is currently needed...    Putting on and taking off regular lower body clothing? 2  -CH     Bathing (including washing, rinsing, and drying) 2  -CH     Toileting (which includes using toilet bed pan or urinal) 2  -CH     Putting on and taking off regular upper body clothing 2  -CH     Taking care of personal grooming (such as brushing teeth)  4  -CH     Eating meals 4  -CH     AM-PAC 6 Clicks Score (OT) 16  -CH     Row Name 12/09/22 1122          How much help from another person do you currently need...    Turning from your back to your side while in flat bed without using bedrails? 4  -CB     Moving from lying on back to sitting on the side of a flat bed without bedrails? 4  -CB     Moving to and from a bed to a chair (including a wheelchair)? 4  -CB     Standing up from a chair using your arms (e.g., wheelchair, bedside chair)? 4  -CB     Climbing 3-5 steps with a railing? 4  -CB     To walk in hospital room? 4  -CB     AM-PAC 6 Clicks Score (PT) 24  -CB     Row Name 12/09/22 1526          Functional Assessment    Outcome Measure Options AM-PAC 6 Clicks Daily Activity (OT)  -           User Key  (r) = Recorded By, (t) = Taken By, (c) = Cosigned By    Initials Name Provider Type     Arabella Altman, OTR/L Occupational Therapist    Pauly Main LPN Licensed Nurse                Occupational Therapy Education     Title: PT OT SLP Therapies (In Progress)     Topic: Occupational Therapy (In Progress)     Point: ADL training (Done)     Description:   Instruct learner(s) on proper safety adaptation and remediation techniques during self care or transfers.   Instruct in proper use of assistive devices.              Learning Progress Summary           Patient Acceptance, E,D, VU,NR by  at 12/9/2022 1611                   Point: Home exercise program (Not Started)     Description:   Instruct learner(s) on appropriate technique for monitoring, assisting and/or progressing therapeutic exercises/activities.              Learner Progress:  Not documented in this visit.          Point: Precautions (Done)     Description:   Instruct learner(s) on prescribed precautions during self-care and functional transfers.              Learning Progress Summary           Patient Acceptance, E,D, VU,NR by  at 12/9/2022 1611                   Point: Body mechanics  (Done)     Description:   Instruct learner(s) on proper positioning and spine alignment during self-care, functional mobility activities and/or exercises.              Learning Progress Summary           Patient Acceptance, E,D, VU,NR by  at 12/9/2022 1611                               User Key     Initials Effective Dates Name Provider Type [facility] 06/16/21 -  Arabella Altman, OTR/L Occupational Therapist OT              OT Recommendation and Plan  Planned Therapy Interventions (OT): activity tolerance training, adaptive equipment training, BADL retraining, functional balance retraining, patient/caregiver education/training, occupation/activity based interventions, strengthening exercise, transfer/mobility retraining, orthotic fabrication/fitting/training  Therapy Frequency (OT): 5 times/wk  Plan of Care Review  Plan of Care Reviewed With: patient  Progress: no change  Outcome Evaluation: OT eval complete.  Upon entering room pt in bed wearing LSO she is alert but drowsy.  OTR edu'd pt in spinal precuations & LSO wearing schedule.  Ms. Bhandari required Min A for bed mob, Max A for LSO mgmt, & Min A to stand and take steps.  Ms Bhandari demmounika's weakness and pain but with increased reps she improves indep.  She would benefit from cont'd OT tx to improve her indep in self care & fxl mob anticipate DC to subacute rehab vs home with assist & HH progress pending     Time Calculation:    Time Calculation- OT     Row Name 12/09/22 1526             Time Calculation- OT    OT Start Time 1526  add 10 for CR  -CH      OT Stop Time 1558  -      OT Time Calculation (min) 32 min  -CH      OT Received On 12/09/22  -      OT Goal Re-Cert Due Date 12/19/22  -         Untimed Charges    OT Eval/Re-eval Minutes 42  -CH         Total Minutes    Untimed Charges Total Minutes 42  -CH       Total Minutes 42  -CH            User Key  (r) = Recorded By, (t) = Taken By, (c) = Cosigned By    Initials Name Provider Type    CHADD Altman  Arabella MEANS, CORAZONR/L Occupational Therapist              Therapy Charges for Today     Code Description Service Date Service Provider Modifiers Qty    02769757227 HC OT EVAL LOW COMPLEXITY 3 12/9/2022 Arabella Altman, CORAZONR/L GO 1               YANN Keith/L  12/9/2022

## 2022-12-09 NOTE — ANESTHESIA POSTPROCEDURE EVALUATION
"Patient: Amanda Bhandari    Procedure Summary     Date: 12/09/22 Room / Location:  [facility]  /  [facility]    Anesthesia Start: 0855 Anesthesia Stop: 1024    Procedure: POSTERIOR SPINAL FUSION WITH INSTRUMENTATION L5-S1 (Spine Lumbar) Diagnosis: (M54.16)    Surgeons: MIGUELANGEL Cabral MD Provider: cT Kowalski CRNA    Anesthesia Type: general ASA Status: 2          Anesthesia Type: general    Vitals  Vitals Value Taken Time   /77 12/09/22 1055   Temp 97.8 °F (36.6 °C) 12/09/22 1100   Pulse 102 12/09/22 1105   Resp 14 12/09/22 1105   SpO2 98 % 12/09/22 1105           Post Anesthesia Care and Evaluation    Patient location during evaluation: PACU  Patient participation: complete - patient participated  Level of consciousness: awake and alert  Pain management: adequate    Airway patency: patent  Anesthetic complications: No anesthetic complications    Cardiovascular status: acceptable  Respiratory status: acceptable  Hydration status: acceptable    Comments: Blood pressure 115/64, pulse 94, temperature 98.3 °F (36.8 °C), temperature source Oral, resp. rate 18, height 157.5 cm (62.01\"), weight 83.1 kg (183 lb 3.2 oz), SpO2 96 %, not currently breastfeeding.    Pt discharged from PACU based on sancho score >8  No anesthesia care post op    "

## 2022-12-09 NOTE — PLAN OF CARE
Goal Outcome Evaluation:  Plan of Care Reviewed With: patient        Progress: no change  Outcome Evaluation: OT eval complete.  Upon entering room pt in bed wearing LSO she is alert but drowsy.  OTR edu'd pt in spinal precuations & LSO wearing schedule.  Ms. Bhandari required Min A for bed mob, Max A for LSO mgmt, & Min A to stand and take steps.  Ms Bhandari demo's weakness and pain but with increased reps she improves indep.  She would benefit from cont'd OT tx to improve her indep in self care & fxl mob anticipate DC to subacute rehab vs home with assist & HH progress pending

## 2022-12-09 NOTE — PLAN OF CARE
Problem: Adult Inpatient Plan of Care  Goal: Plan of Care Review  Outcome: Ongoing, Progressing  Flowsheets (Taken 12/9/2022 2290)  Progress: no change  Plan of Care Reviewed With: patient  Outcome Evaluation: PT is A&Ox4. North in place. Dressing to side is CDI. NPO since midnight for surgery today.  VSS. Safety maintained.

## 2022-12-09 NOTE — PLAN OF CARE
Goal Outcome Evaluation:  Plan of Care Reviewed With: patient        Progress: no change   Pt alert and oriented x4. VSS. Pt c/o headache and back pain. PRN medications given with some relief. GUERRERO. PPP. SCDs for VTE. , Room air during the day, 2L NC at night. Tolerating prescribed diet. NPO after midnight. Surgery tomorrow morning. Skin intact. Dressing site, CDI. Voiding via iqbal cath. Bedrest.  Last BM 12/5. Bed alarm set. Call light within reach. Safety maintained.

## 2022-12-09 NOTE — PROGRESS NOTES
"    Daily Progress Note  Amanda Bhandari  MRN: [id number] LOS: 2    Admit Date: 2022 07:00 CST    Subjective:         Interval History:    Reviewed overnight events and nursing notes.     COVID and flu testing negative.  On oxygen at night but comes off during day.  Still no bowel movement and North catheter remained indwelling due to bedrest orders from spinal surgery.    Back pain present this morning.  She is n.p.o. for surgery.  No other issues.    ROS:  Review of Systems   Constitutional: Negative for chills and fever.   Respiratory: Negative for cough, chest tightness and shortness of breath.    Cardiovascular: Negative for chest pain.   Gastrointestinal: Negative for abdominal pain, diarrhea, nausea and vomiting.       DIET:  NPO Diet NPO Type: Strict NPO    Medications:      buPROPion XL, 150 mg, Oral, Daily  ceFAZolin, 2 g, Intravenous, Once  famotidine, 20 mg, Intravenous, Q12H   Or  famotidine, 20 mg, Oral, Q12H  ferrous sulfate, 325 mg, Oral, Daily With Breakfast  fluticasone, 2 spray, Each Nare, Daily  guaiFENesin, 600 mg, Oral, Q12H  levothyroxine, 25 mcg, Oral, Q AM  mirtazapine, 15 mg, Oral, Nightly  polyethylene glycol, 17 g, Oral, TID  senna-docusate sodium, 1 tablet, Oral, BID  sodium chloride, 3 mL, Intravenous, Q12H  traZODone, 50 mg, Oral, Nightly  zolpidem, 10 mg, Oral, Nightly          Objective:     Vitals: /66 (BP Location: Right arm, Patient Position: Lying)   Pulse 98   Temp 99.7 °F (37.6 °C) (Oral)   Resp 18   Ht 157.5 cm (62.01\")   Wt 83.1 kg (183 lb 3.2 oz)   SpO2 93%   BMI 33.50 kg/m²    Intake/Output Summary (Last 24 hours) at 2022 0700  Last data filed at 2022 0347  Gross per 24 hour   Intake --   Output 2700 ml   Net -2700 ml    Temp (24hrs), Av.1 °F (37.3 °C), Min:98.6 °F (37 °C), Max:99.7 °F (37.6 °C)    Glucose:  No results found for: POCGLU  Physical Examination:   Physical Exam  Constitutional:       General: She is not in acute " distress.     Appearance: She is not toxic-appearing.   Cardiovascular:      Rate and Rhythm: Normal rate and regular rhythm.      Pulses: Normal pulses.      Heart sounds: Normal heart sounds. No murmur heard.  Pulmonary:      Effort: Pulmonary effort is normal. No respiratory distress.      Breath sounds: Normal breath sounds. No stridor. No wheezing or rales.   Abdominal:      General: Abdomen is flat. Bowel sounds are normal. There is no distension.      Palpations: Abdomen is soft.      Tenderness: There is no abdominal tenderness.   Neurological:      Mental Status: She is alert.         Labs:  Lab Results (last 24 hours)     Procedure Component Value Units Date/Time    Basic Metabolic Panel [[id number]]  (Abnormal) Collected: 12/09/22 0618    Specimen: Blood Updated: 12/09/22 0650     Glucose 113 mg/dL      BUN 10 mg/dL      Creatinine 1.06 mg/dL      Sodium 138 mmol/L      Potassium 3.9 mmol/L      Chloride 104 mmol/L      CO2 25.0 mmol/L      Calcium 8.7 mg/dL      BUN/Creatinine Ratio 9.4     Anion Gap 9.0 mmol/L      eGFR 58.1 mL/min/1.73      Comment: National Kidney Foundation and American Society of Nephrology (ASN) Task Force recommended calculation based on the Chronic Kidney Disease Epidemiology Collaboration (CKD-EPI) equation refit without adjustment for race.       Narrative:      GFR Normal >60  Chronic Kidney Disease <60  Kidney Failure <15      CBC & Differential [[id number]]  (Abnormal) Collected: 12/09/22 0618    Specimen: Blood Updated: 12/09/22 0630    Narrative:      The following orders were created for panel order CBC & Differential.  Procedure                               Abnormality         Status                     ---------                               -----------         ------                     CBC Auto Differential[[id number]]        Abnormal            Final result                 Please view results for these tests on the individual orders.    CBC Auto Differential  [[id number]]  (Abnormal) Collected: 12/09/22 0618    Specimen: Blood Updated: 12/09/22 0630     WBC 8.07 10*3/mm3      RBC 3.19 10*6/mm3      Hemoglobin 9.3 g/dL      Hematocrit 30.8 %      MCV 96.6 fL      MCH 29.2 pg      MCHC 30.2 g/dL      RDW 12.8 %      RDW-SD 45.1 fl      MPV 9.8 fL      Platelets 227 10*3/mm3      Neutrophil % 77.6 %      Lymphocyte % 14.7 %      Monocyte % 6.3 %      Eosinophil % 0.7 %      Basophil % 0.2 %      Immature Grans % 0.5 %      Neutrophils, Absolute 6.25 10*3/mm3      Lymphocytes, Absolute 1.19 10*3/mm3      Monocytes, Absolute 0.51 10*3/mm3      Eosinophils, Absolute 0.06 10*3/mm3      Basophils, Absolute 0.02 10*3/mm3      Immature Grans, Absolute 0.04 10*3/mm3      nRBC 0.0 /100 WBC     Folate [[id number]]  (Abnormal) Collected: 12/08/22 0528    Specimen: Blood Updated: 12/08/22 1238     Folate 4.53 ng/mL     Narrative:      Results may be falsely increased if patient taking Biotin.      Vitamin B12 [[id number]]  (Normal) Collected: 12/08/22 0528    Specimen: Blood Updated: 12/08/22 1238     Vitamin B-12 592 pg/mL     Narrative:      Results may be falsely increased if patient taking Biotin.      COVID-19 RAPID AG,VERITOR,COR/EUGENIA/PAD/SG/MAD/HAN/LAG/TANIYA/ IN-HOUSE,DRY SWAB, 1-2 HR TAT - Swab, Nasal Cavity [[id number]]  (Normal) Collected: 12/08/22 0800    Specimen: Swab from Nasal Cavity Updated: 12/08/22 0854     COVID19 Presumptive Negative    Narrative:      Fact sheets for providers: https://www.fda.gov/media/566430/download    Fact sheets for patients: https://www.fda.gov/media/007288/download    Influenza Antigen, Rapid - Swab, Nasopharynx [[id number]]  (Normal) Collected: 12/08/22 0800    Specimen: Swab from Nasopharynx Updated: 12/08/22 0842     Influenza A Ag, EIA Negative     Influenza B Ag, EIA Negative    Narrative:      Recommend confirmation of negative results by viral culture or molecular assay.           Imaging:  FL C Arm During Surgery    Result Date:  12/7/2022  XR SPINE LUMBAR AP AND LATERAL- 12/7/2022 8:00 AM CST  HISTORY: fusion  COMPARISON: None  FLUOROSCOPY TIME: 15.2 seconds  FLUOROSCOPY DOSE: 16.30 mGy  NUMBER OF IMAGES: 7      Impression:  Intraoperative fluoroscopic images during lumbar fusion.  Please refer to the operative note for more details. This report was finalized on 12/07/2022 10:10 by Dr. Eris Morelos MD.    XR Abdomen KUB    Result Date: 12/7/2022  EXAMINATION: KUB radiograph 12/07/2022  HISTORY: Rule out count.  FINDINGS: KUB radiograph reveals the patient has undergone anterior fusion at the L5-S1 level. A North catheter is in place. There is a normal bowel gas pattern. No evidence of retained radiopaque foreign body.      Impression: 1.. No retained radiopaque foreign body. The OR was notified of the findings. This report was finalized on 12/07/2022 09:40 by Dr. Anirudh Herbert MD.    XR Spine Lumbar AP & Lateral    Result Date: 12/7/2022  XR SPINE LUMBAR AP AND LATERAL- 12/7/2022 8:00 AM CST  HISTORY: fusion  COMPARISON: None  FLUOROSCOPY TIME: 15.2 seconds  FLUOROSCOPY DOSE: 16.30 mGy  NUMBER OF IMAGES: 7      Impression:  Intraoperative fluoroscopic images during lumbar fusion.  Please refer to the operative note for more details. This report was finalized on 12/07/2022 10:10 by Dr. Eris Morelos MD.         Assessment and Plan:     Primary Problem:  Lumbar stenosis    Hospital Problem list:    Lumbar stenosis    Hypertension    Hypothyroidism (acquired)    Drug induced constipation      Assessment: 67-year-old female with past medical history of hypertension and hypothyroidism who presents for lumbar surgery.  She underwent first stage lumbar surgery on 12/7 and is going for second/final stage on 12/9.       Plan:     Perioperative care  Continue bowel regimen, counseled to work with physical and occupational therapy, counseled on appropriate use of incentive spirometry.    Going for second and final stage today.     Anemia,  multifactorial  Iron labs show mild iron deficiency anemia which is probably exacerbated by blood loss from surgery.    Continue iron supplementation     Hypertension  Blood pressure well controlled, no changes     Acute viral syndrome  COVID and flu testing negative.  Probably has some other nonspecific virus, not worth doing respiratory viral panel.  Symptomatic therapy with Flonase and Mucinex ordered.    Counseled to get out of bed and do physical therapy after surgery today as spinal surgery allows.  Plan to remove North catheter once bedrest orders are removed which should happen postoperatively today.    Of note, it appears there has been a slight change to surgical planning due to her degree of osteoporosis observed during the first stage of her lumbar fusion.  This could complicate discharge planning.  Anticipate difficult pain control but hopefully can mobilize over the weekend.    Reviewed treatment plans with the patient and/or family.     Code Status:   Code Status and Medical Interventions:   Ordered at: 12/07/22 1310     Code Status (Patient has no pulse and is not breathing):    CPR (Attempt to Resuscitate)     Medical Interventions (Patient has pulse or is breathing):    Full       Electronically signed by Narciso Viveros MD on 12/9/2022 at 07:00 CST

## 2022-12-10 LAB
ANION GAP SERPL CALCULATED.3IONS-SCNC: 7 MMOL/L (ref 5–15)
BASOPHILS # BLD AUTO: 0.02 10*3/MM3 (ref 0–0.2)
BASOPHILS NFR BLD AUTO: 0.3 % (ref 0–1.5)
BUN SERPL-MCNC: 11 MG/DL (ref 8–23)
BUN/CREAT SERPL: 11.5 (ref 7–25)
CALCIUM SPEC-SCNC: 8.4 MG/DL (ref 8.6–10.5)
CHLORIDE SERPL-SCNC: 106 MMOL/L (ref 98–107)
CO2 SERPL-SCNC: 27 MMOL/L (ref 22–29)
CREAT SERPL-MCNC: 0.96 MG/DL (ref 0.57–1)
DEPRECATED RDW RBC AUTO: 45 FL (ref 37–54)
EGFRCR SERPLBLD CKD-EPI 2021: 65.4 ML/MIN/1.73
EOSINOPHIL # BLD AUTO: 0.06 10*3/MM3 (ref 0–0.4)
EOSINOPHIL NFR BLD AUTO: 0.8 % (ref 0.3–6.2)
ERYTHROCYTE [DISTWIDTH] IN BLOOD BY AUTOMATED COUNT: 12.6 % (ref 12.3–15.4)
GLUCOSE SERPL-MCNC: 116 MG/DL (ref 65–99)
HCT VFR BLD AUTO: 26.7 % (ref 34–46.6)
HGB BLD-MCNC: 7.9 G/DL (ref 12–15.9)
IMM GRANULOCYTES # BLD AUTO: 0.03 10*3/MM3 (ref 0–0.05)
IMM GRANULOCYTES NFR BLD AUTO: 0.4 % (ref 0–0.5)
LYMPHOCYTES # BLD AUTO: 1.54 10*3/MM3 (ref 0.7–3.1)
LYMPHOCYTES NFR BLD AUTO: 21.8 % (ref 19.6–45.3)
MCH RBC QN AUTO: 29 PG (ref 26.6–33)
MCHC RBC AUTO-ENTMCNC: 29.6 G/DL (ref 31.5–35.7)
MCV RBC AUTO: 98.2 FL (ref 79–97)
MONOCYTES # BLD AUTO: 0.45 10*3/MM3 (ref 0.1–0.9)
MONOCYTES NFR BLD AUTO: 6.4 % (ref 5–12)
NEUTROPHILS NFR BLD AUTO: 4.96 10*3/MM3 (ref 1.7–7)
NEUTROPHILS NFR BLD AUTO: 70.3 % (ref 42.7–76)
NRBC BLD AUTO-RTO: 0 /100 WBC (ref 0–0.2)
PLATELET # BLD AUTO: 229 10*3/MM3 (ref 140–450)
PMV BLD AUTO: 10 FL (ref 6–12)
POTASSIUM SERPL-SCNC: 4.3 MMOL/L (ref 3.5–5.2)
RBC # BLD AUTO: 2.72 10*6/MM3 (ref 3.77–5.28)
SODIUM SERPL-SCNC: 140 MMOL/L (ref 136–145)
WBC NRBC COR # BLD: 7.06 10*3/MM3 (ref 3.4–10.8)

## 2022-12-10 PROCEDURE — 85025 COMPLETE CBC W/AUTO DIFF WBC: CPT | Performed by: PHYSICIAN ASSISTANT

## 2022-12-10 PROCEDURE — 25010000002 CEFAZOLIN 1-4 GM/50ML-% SOLUTION: Performed by: PHYSICIAN ASSISTANT

## 2022-12-10 PROCEDURE — 97161 PT EVAL LOW COMPLEX 20 MIN: CPT | Performed by: PHYSICAL THERAPIST

## 2022-12-10 PROCEDURE — 80048 BASIC METABOLIC PNL TOTAL CA: CPT | Performed by: PHYSICIAN ASSISTANT

## 2022-12-10 PROCEDURE — 97116 GAIT TRAINING THERAPY: CPT

## 2022-12-10 RX ORDER — BISACODYL 10 MG
10 SUPPOSITORY, RECTAL RECTAL DAILY PRN
Status: DISCONTINUED | OUTPATIENT
Start: 2022-12-10 | End: 2022-12-11 | Stop reason: HOSPADM

## 2022-12-10 RX ADMIN — ZOLPIDEM TARTRATE 10 MG: 5 TABLET ORAL at 21:03

## 2022-12-10 RX ADMIN — POLYETHYLENE GLYCOL 3350 17 G: 17 POWDER, FOR SOLUTION ORAL at 08:26

## 2022-12-10 RX ADMIN — DOCUSATE SODIUM 50 MG AND SENNOSIDES 8.6 MG 1 TABLET: 8.6; 5 TABLET, FILM COATED ORAL at 21:03

## 2022-12-10 RX ADMIN — DOCUSATE SODIUM 50 MG AND SENNOSIDES 8.6 MG 1 TABLET: 8.6; 5 TABLET, FILM COATED ORAL at 08:26

## 2022-12-10 RX ADMIN — HYDROCODONE BITARTRATE AND ACETAMINOPHEN 1 TABLET: 10; 325 TABLET ORAL at 12:30

## 2022-12-10 RX ADMIN — FAMOTIDINE 20 MG: 20 TABLET, FILM COATED ORAL at 08:25

## 2022-12-10 RX ADMIN — Medication 3 ML: at 21:04

## 2022-12-10 RX ADMIN — GUAIFENESIN 600 MG: 600 TABLET, EXTENDED RELEASE ORAL at 08:25

## 2022-12-10 RX ADMIN — ACETAMINOPHEN 650 MG: 325 TABLET, FILM COATED ORAL at 19:58

## 2022-12-10 RX ADMIN — LEVOTHYROXINE SODIUM 25 MCG: 25 TABLET ORAL at 05:55

## 2022-12-10 RX ADMIN — FERROUS SULFATE TAB 325 MG (65 MG ELEMENTAL FE) 325 MG: 325 (65 FE) TAB at 08:26

## 2022-12-10 RX ADMIN — POLYETHYLENE GLYCOL 3350 17 G: 17 POWDER, FOR SOLUTION ORAL at 21:03

## 2022-12-10 RX ADMIN — FAMOTIDINE 20 MG: 20 TABLET, FILM COATED ORAL at 21:03

## 2022-12-10 RX ADMIN — SODIUM CHLORIDE 75 ML/HR: 9 INJECTION, SOLUTION INTRAVENOUS at 04:15

## 2022-12-10 RX ADMIN — CEFAZOLIN SODIUM 1 G: 1 INJECTION, SOLUTION INTRAVENOUS at 00:19

## 2022-12-10 RX ADMIN — FLUTICASONE PROPIONATE 2 SPRAY: 50 SPRAY, METERED NASAL at 08:26

## 2022-12-10 RX ADMIN — Medication 3 ML: at 08:25

## 2022-12-10 RX ADMIN — POLYETHYLENE GLYCOL 3350 17 G: 17 POWDER, FOR SOLUTION ORAL at 16:38

## 2022-12-10 RX ADMIN — GUAIFENESIN 600 MG: 600 TABLET, EXTENDED RELEASE ORAL at 21:03

## 2022-12-10 RX ADMIN — BUPROPION HYDROCHLORIDE 150 MG: 150 TABLET, FILM COATED, EXTENDED RELEASE ORAL at 08:25

## 2022-12-10 RX ADMIN — CEFAZOLIN SODIUM 1 G: 1 INJECTION, SOLUTION INTRAVENOUS at 08:25

## 2022-12-10 NOTE — PLAN OF CARE
Goal Outcome Evaluation:  Plan of Care Reviewed With: patient        Progress: no change  Outcome Evaluation: pt drowsy most of shift. c/o pain right lower extremity nerve pain. no BM. given miralax and senna. offered to give supp. will wait until tomorrow to see if pt able to go between now and then.

## 2022-12-10 NOTE — PLAN OF CARE
Goal Outcome Evaluation:  Plan of Care Reviewed With: patient        Progress: no change  Outcome Evaluation: PT eval completed. Pt alert and oriented x4 with c/o 3/10 pain in RLE. Pt recites 3/3 spinal precautions, brace wear schedule and log rolling. Pt reqd CGA-min A to come to EOB with assistance for LE management. Pt with decreased strength in BLE with greater deficits in RLE as compared to LLE. Pt performed sit to stand t/f with mod A and takes a couple of steps to chair with min A and RW. Pt with forward flexed posture and sustained knee flexion with mobility. Pt will benefit from skilled PT to improve fxl mob and strength. Recommend d/c SNF.

## 2022-12-10 NOTE — PROGRESS NOTES
Amanda Bhandari  67 y.o.  female  Subjective     Post-Operative Day # 3/1    Systemic or Specific Complaints:     Patient complains of mild pain at this time.  Pain reasonable control medication.  Patient has not ambulated with physical therapy due to complications with soft bones and bedrest after the first stage of her procedure.  Physical therapy to work with her today.  No other complaints at this time.    Objective     Vital signs in last 24 hours:  Temp:  [97.8 °F (36.6 °C)-99.1 °F (37.3 °C)] 98 °F (36.7 °C)  Heart Rate:  [] 72  Resp:  [12-20] 18  BP: ()/(51-77) 93/51    Physical Exam:    Alert and oriented ×3, no acute distress, grossly neurovascularly intact, vital signs stable, dressing clean dry and intact, moving all extremities without focal deficit      Diagnostic Data:  CBC:  Results from last 7 days   Lab Units 12/10/22  0621   WBC 10*3/mm3 7.06   RBC 10*6/mm3 2.72*   HEMOGLOBIN g/dL 7.9*   HEMATOCRIT % 26.7*   PLATELETS 10*3/mm3 229          Assessment & Plan     1. Low back pain.  2. Right buttock, thigh and leg radiculopathy.   3. Multilevel lumbar degenerative disc disease, worse L5-S1.   4. Multilevel lumbar facet arthropathy, worse right L5-S1.   5. Thoracolumbar degenerative scoliosis, concave left T11 to L4, concave right L5-S1.   6. Right-sided foraminal stenosis L5-S1.   7. Possible right sacroiliitis.  8.  Status post anterior decompression, ALIF with instrumentation L5-S1, 12/7/2022  9.  Status post PSF with instrumentation L5-S1, 12/9/2022    Plan:  1. PT/OT/Brace  2. Periops  3. Probably home 1 to 2 days          Yuri Webb PA-C    Date: 12/10/2022  Time: 09:57 CST

## 2022-12-10 NOTE — PLAN OF CARE
Goal Outcome Evaluation:  Plan of Care Reviewed With: patient        Progress: no change   Pt had part 2 of 2 back surgery today. Pt is alert and oriented x4. VSS. Pt c/o back pain.PRN medications given with some relief. GUERRERO. PPP. SCDs for VTE. , room air during the day, 2L at night. Tolerating prescribed diet. Skin intact. Incision site, CDI. Voiding via iqbal cath. Up x 1 with LSO brace. Bed alarm set. Call light within reach. Safety maintained.

## 2022-12-10 NOTE — THERAPY TREATMENT NOTE
Acute Care - Physical Therapy Treatment Note  [facility]     Patient Name: Amanda Bhandari  : 1955  MRN: [id number]  Today's Date: 12/10/2022      Visit Dx:     ICD-10-CM ICD-9-CM   1. Impaired mobility  Z74.09 799.89     Patient Active Problem List   Diagnosis   • Hypertension   • Lumbar stenosis   • Hypothyroidism (acquired)   • Drug induced constipation     Past Medical History:   Diagnosis Date   • Arthritis    • Disease of thyroid gland    • History of transfusion 1977 tubal pregnancy ruptured   • Hypertension    • Hypothyroidism    • Pulmonary embolism (HCC)      Past Surgical History:   Procedure Laterality Date   • ABLATION OF DYSRHYTHMIC FOCUS      Pre Daugherty's   • ANTERIOR LUMBAR EXPOSURE N/A 2022    Procedure: ANTERIOR LUMBAR EXPOSURE;  Surgeon: MIGUELANGEL Cabral MD;  Location: [facility];  Service: Orthopedic Spine;  Laterality: N/A;   • APPENDECTOMY     • DIAGNOSTIC LAPAROSCOPY EXPLORATORY LAPAROTOMY N/A     FROM HORSE INJURY   • FRACTURE SURGERY Left     ANKLE, PLATE PLACED   • HYSTERECTOMY     • JOINT REPLACEMENT Bilateral    • LUMBAR FUSION N/A 2022    Procedure: ANTERIOR DECOMPRESSION, ANTERIOR LUMBAR INTERBODY FUSION WITH INSTRUMENTATION L5-S1, WITH EXPOSURE AND CLOSURE;  Surgeon: MIGULEANGEL Cabral MD;  Location: [facility];  Service: Orthopedic Spine;  Laterality: N/A;   • SHOULDER SURGERY Left    • WRIST SURGERY       PT Assessment (last 12 hours)     PT Evaluation and Treatment     Row Name 12/10/22 1440          Physical Therapy Time and Intention    Subjective Information complains of;pain  -     Document Type therapy note (daily note)  -     Mode of Treatment physical therapy  -     Row Name 12/10/22 144          General Information    Existing Precautions/Restrictions fall;brace worn when out of bed;LSO  -     Row Name 12/10/22 1447          Pain    Pretreatment Pain Rating 10  -     Posttreatment Pain Rating 10  -     Pain Location -  Side/Orientation Right  -DAMIAN     Pain Location lower  -DAMIAN     Pain Location - extremity  -DAMIAN     Pain Intervention(s) Repositioned;Ambulation/increased activity  -DAMIAN     Row Name 12/10/22 1440          Bed Mobility    Sidelying-Sit [place] (Bed Mobility) verbal cues;contact guard  -DAMIAN     Sit-Sidelying [place] (Bed Mobility) verbal cues;minimum assist (75% patient effort)  -DAMIAN     Assistive Device (Bed Mobility) bed rails  -DAMIAN     Row Name 12/10/22 1440          Sit-Stand Transfer    Sit-Stand [place] (Transfers) verbal cues;contact guard  -DAMIAN     Assistive Device (Sit-Stand Transfers) walker, front-wheeled  -DAMIAN     Row Name 12/10/22 1440          Stand-Sit Transfer    Stand-Sit [place] (Transfers) verbal cues;contact guard  -DAMIAN     Row Name 12/10/22 1440          Gait/Stairs (Locomotion)    [place] Level (Gait) verbal cues;contact guard  -DAMIAN     Assistive Device (Gait) walker, front-wheeled  -DAMIAN     Distance in Feet (Gait) 70  -DAMIAN     Deviations/Abnormal Patterns (Gait) gait speed decreased;stride length decreased  -DAMIAN     Bilateral Gait Deviations forward flexed posture  -DAMIAN     Row Name             Wound 12/07/22 0845 midline abdomen    Wound - Properties Group Placement Date: 12/07/22  -LASHONDA Placement Time: 0845  -LASHONDA Present on Hospital Admission: N  -LASHONDA Orientation: midline  -LASHONDA Location: abdomen  -LASHONDA Additional Comments: Mastisol, steri strips, telfa, 4x4, tegaderm  -LASHONDA    Retired Wound - Properties Group Placement Date: 12/07/22  -LASHONDA Placement Time: 0845  -LASHONDA Present on Hospital Admission: N  -LASHONDA Orientation: midline  -LASHONDA Location: abdomen  -LASHONDA Additional Comments: Mastisol, steri strips, telfa, 4x4, tegaderm  -LASHONDA    Retired Wound - Properties Group Date first assessed: 12/07/22  -LASHONDA Time first assessed: 0845  -LASHONDA Present on Hospital Admission: N  -LASHONDA Location: abdomen  -LASHONDA Additional Comments: Mastisol, steri strips, telfa, 4x4, tegaderm  -LASHONDA    Row Name             Wound 12/09/22 1006  Bilateral back    Wound - Properties Group Placement Date: 12/09/22  -LASHONDA Placement Time: 1006  -LASHONDA Present on Hospital Admission: N  -LASHONDA Side: Bilateral  -LASHONDA Location: back  -LASHONDA Additional Comments: Mastisol, steri strips, telfa, 4x4, tape  -LASHONDA    Retired Wound - Properties Group Placement Date: 12/09/22  -LASHONDA Placement Time: 1006  -LASHONDA Present on Hospital Admission: N  -LASHONDA Side: Bilateral  -LASHONDA Location: back  -LASHONDA Additional Comments: Mastisol, steri strips, telfa, 4x4, tape  -LASHONDA    Retired Wound - Properties Group Date first assessed: 12/09/22  -LASHONDA Time first assessed: 1006  -LASHONDA Present on Hospital Admission: N  -LASHONDA Side: Bilateral  -LASHONDA Location: back  -LASHONDA Additional Comments: Mastisol, steri strips, telfa, 4x4, tape  -LASHONDA    Row Name 12/10/22 1440          Positioning and Restraints    Pre-Treatment Position in bed  -DAMIAN     Post Treatment Position bed  -DAMIAN     In Bed fowlers;call light within reach;encouraged to call for assist;side rails up x2  -DAMIAN           User Key  (r) = Recorded By, (t) = Taken By, (c) = Cosigned By    Initials Name Provider Type    Joseph Hicks PTA Physical Therapist Assistant    Samia Phillips, RN Registered Nurse                Physical Therapy Education     Title: PT OT SLP Therapies (In Progress)     Topic: Physical Therapy (In Progress)     Point: Mobility training (Done)     Learning Progress Summary           Patient Acceptance, E, VU by SB at 12/10/2022 1052    Comment: pt edu on POC, benefits of act, spina precautions, bracing, d/c plans                   Point: Home exercise program (Not Started)     Learner Progress:  Not documented in this visit.          Point: Body mechanics (Not Started)     Learner Progress:  Not documented in this visit.          Point: Precautions (Done)     Learning Progress Summary           Patient Acceptance, E, VU by SB at 12/10/2022 1052    Comment: pt edu on POC, benefits of act, spina precautions, bracing, d/c plans                                User Key     Initials Effective Dates Name Provider Type Discipline    SB 06/16/21 -  Gisel Watters, PT DPT Physical Therapist PT              PT Recommendation and Plan             Time Calculation:    PT Charges     Row Name 12/10/22 1440 12/10/22 1302          Time Calculation    Start Time 1440  -DAMIAN 1040  -SB     Stop Time 1454  -DAMIAN 1133  -SB     Time Calculation (min) 14 min  -DAMIAN 53 min  -SB     PT Received On 12/10/22  -DAMIAN 12/10/22  -SB     PT Goal Re-Cert Due Date -- 12/20/22  -SB        Time Calculation- PT    Total Timed Code Minutes- PT 14 minute(s)  -DAMIAN --        Timed Charges    52997 - Gait Training Minutes  14  -DAMIAN --        Total Minutes    Timed Charges Total Minutes 14  -DAMIAN --      Total Minutes 14  -DAMIAN --           User Key  (r) = Recorded By, (t) = Taken By, (c) = Cosigned By    Initials Name Provider Type    Joseph Hicks PTA Physical Therapist Assistant    SB Gisel Watters, PT DPT Physical Therapist              Therapy Charges for Today     Code Description Service Date Service Provider Modifiers Qty    [id number] HC GAIT TRAINING EA 15 MIN 12/10/2022 oJseph Alba PTA GP 1          PT G-Codes  Outcome Measure Options: AM-PAC 6 Clicks Basic Mobility (PT)  AM-PAC 6 Clicks Score (PT): 17  AM-PAC 6 Clicks Score (OT): 16    Joseph Alba PTA  12/10/2022

## 2022-12-10 NOTE — PROGRESS NOTES
Daily Progress Note  Amanda Bhandari  MRN: [id number] LOS: 3    Admit Date: 2022   12/10/2022 06:22 CST          Chief Complaint:  Lumbar stenosis with radiculopathy    Interval History:    Reviewed overnight events and nursing notes.   Status:  better than before  Postoperative pain is controlled  Sleeping heavily, hypersomnolent somewhat difficult to realize but alert and oriented.  No complaints this AM  Denies chest pain  No shortness of breath  COVID and flu test negative        DIET:  Diet: Regular/House Diet; Texture: Regular Texture (IDDSI 7); Fluid Consistency: Thin (IDDSI 0)    Medications:   sodium chloride, 75 mL/hr, Last Rate: 75 mL/hr (12/10/22 0415)      buPROPion XL, 150 mg, Oral, Daily  ceFAZolin, 1 g, Intravenous, Q8H  famotidine, 20 mg, Intravenous, Q12H   Or  famotidine, 20 mg, Oral, Q12H  ferrous sulfate, 325 mg, Oral, Daily With Breakfast  fluticasone, 2 spray, Each Nare, Daily  guaiFENesin, 600 mg, Oral, Q12H  levothyroxine, 25 mcg, Oral, Q AM  mirtazapine, 15 mg, Oral, Nightly  polyethylene glycol, 17 g, Oral, TID  senna-docusate sodium, 1 tablet, Oral, BID  sodium chloride, 3 mL, Intravenous, Q12H  traZODone, 50 mg, Oral, Nightly  zolpidem, 10 mg, Oral, Nightly        Data:     Code Status:   Code Status and Medical Interventions:   Ordered at: 22 1310     Code Status (Patient has no pulse and is not breathing):    CPR (Attempt to Resuscitate)     Medical Interventions (Patient has pulse or is breathing):    Full       Family History   Problem Relation Age of Onset   • Cancer Mother         Ovarian   • Heart attack Mother    • Cancer Father         Esophageal cancer, prostate cancer     Social History     Socioeconomic History   • Marital status:    Tobacco Use   • Smoking status: Former     Packs/day: 1.50     Years: 15.00     Pack years: 22.50     Types: Cigarettes     Quit date: 2014     Years since quittin.9   • Smokeless tobacco: Never   Vaping Use   •  Vaping Use: Never used   Substance and Sexual Activity   • Alcohol use: Not Currently     Comment: VERY SELDOM   • Drug use: Never   • Sexual activity: Not Currently     Partners: Male     Birth control/protection: Hysterectomy       Labs:  Lab Results (last 72 hours)     Procedure Component Value Units Date/Time    Basic Metabolic Panel [[id number]]  (Abnormal) Collected: 12/09/22 0618    Specimen: Blood Updated: 12/09/22 0650     Glucose 113 mg/dL      BUN 10 mg/dL      Creatinine 1.06 mg/dL      Sodium 138 mmol/L      Potassium 3.9 mmol/L      Chloride 104 mmol/L      CO2 25.0 mmol/L      Calcium 8.7 mg/dL      BUN/Creatinine Ratio 9.4     Anion Gap 9.0 mmol/L      eGFR 58.1 mL/min/1.73      Comment: National Kidney Foundation and American Society of Nephrology (ASN) Task Force recommended calculation based on the Chronic Kidney Disease Epidemiology Collaboration (CKD-EPI) equation refit without adjustment for race.       Narrative:      GFR Normal >60  Chronic Kidney Disease <60  Kidney Failure <15      CBC & Differential [[id number]]  (Abnormal) Collected: 12/09/22 0618    Specimen: Blood Updated: 12/09/22 0630    Narrative:      The following orders were created for panel order CBC & Differential.  Procedure                               Abnormality         Status                     ---------                               -----------         ------                     CBC Auto Differential[[id number]]        Abnormal            Final result                 Please view results for these tests on the individual orders.    CBC Auto Differential [[id number]]  (Abnormal) Collected: 12/09/22 0618    Specimen: Blood Updated: 12/09/22 0630     WBC 8.07 10*3/mm3      RBC 3.19 10*6/mm3      Hemoglobin 9.3 g/dL      Hematocrit 30.8 %      MCV 96.6 fL      MCH 29.2 pg      MCHC 30.2 g/dL      RDW 12.8 %      RDW-SD 45.1 fl      MPV 9.8 fL      Platelets 227 10*3/mm3      Neutrophil % 77.6 %      Lymphocyte % 14.7 %       Monocyte % 6.3 %      Eosinophil % 0.7 %      Basophil % 0.2 %      Immature Grans % 0.5 %      Neutrophils, Absolute 6.25 10*3/mm3      Lymphocytes, Absolute 1.19 10*3/mm3      Monocytes, Absolute 0.51 10*3/mm3      Eosinophils, Absolute 0.06 10*3/mm3      Basophils, Absolute 0.02 10*3/mm3      Immature Grans, Absolute 0.04 10*3/mm3      nRBC 0.0 /100 WBC     Folate [[id number]]  (Abnormal) Collected: 12/08/22 0528    Specimen: Blood Updated: 12/08/22 1238     Folate 4.53 ng/mL     Narrative:      Results may be falsely increased if patient taking Biotin.      Vitamin B12 [[id number]]  (Normal) Collected: 12/08/22 0528    Specimen: Blood Updated: 12/08/22 1238     Vitamin B-12 592 pg/mL     Narrative:      Results may be falsely increased if patient taking Biotin.      COVID-19 RAPID AG,VERITOR,COR/EUGENIA/PAD/SG/MAD/HAN/LAG/TANIYA/ IN-HOUSE,DRY SWAB, 1-2 HR TAT - Swab, Nasal Cavity [[id number]]  (Normal) Collected: 12/08/22 0800    Specimen: Swab from Nasal Cavity Updated: 12/08/22 0854     COVID19 Presumptive Negative    Narrative:      Fact sheets for providers: https://www.fda.gov/media/776591/download    Fact sheets for patients: https://www.fda.gov/media/097201/download    Influenza Antigen, Rapid - Swab, Nasopharynx [[id number]]  (Normal) Collected: 12/08/22 0800    Specimen: Swab from Nasopharynx Updated: 12/08/22 0842     Influenza A Ag, EIA Negative     Influenza B Ag, EIA Negative    Narrative:      Recommend confirmation of negative results by viral culture or molecular assay.    Basic Metabolic Panel [[id number]]  (Abnormal) Collected: 12/08/22 0528    Specimen: Blood Updated: 12/08/22 0606     Glucose 98 mg/dL      BUN 9 mg/dL      Creatinine 1.17 mg/dL      Sodium 141 mmol/L      Potassium 4.3 mmol/L      Chloride 107 mmol/L      CO2 27.0 mmol/L      Calcium 8.2 mg/dL      BUN/Creatinine Ratio 7.7     Anion Gap 7.0 mmol/L      eGFR 51.6 mL/min/1.73      Comment: National Kidney Foundation and American Society  of Nephrology (ASN) Task Force recommended calculation based on the Chronic Kidney Disease Epidemiology Collaboration (CKD-EPI) equation refit without adjustment for race.       Narrative:      GFR Normal >60  Chronic Kidney Disease <60  Kidney Failure <15      Iron Profile [[id number]]  (Abnormal) Collected: 12/08/22 0528    Specimen: Blood Updated: 12/08/22 0606     Iron 24 mcg/dL      Iron Saturation 8 %      Transferrin 197 mg/dL      TIBC 294 mcg/dL     Ferritin [[id number]]  (Normal) Collected: 12/08/22 0528    Specimen: Blood Updated: 12/08/22 0606     Ferritin 58.96 ng/mL     Narrative:      Results may be falsely decreased if patient taking Biotin.      CBC & Differential [[id number]]  (Abnormal) Collected: 12/08/22 0528    Specimen: Blood Updated: 12/08/22 0542    Narrative:      The following orders were created for panel order CBC & Differential.  Procedure                               Abnormality         Status                     ---------                               -----------         ------                     CBC Auto Differential[[id number]]        Abnormal            Final result                 Please view results for these tests on the individual orders.    CBC Auto Differential [[id number]]  (Abnormal) Collected: 12/08/22 0528    Specimen: Blood Updated: 12/08/22 0542     WBC 6.42 10*3/mm3      RBC 3.24 10*6/mm3      Hemoglobin 9.4 g/dL      Hematocrit 32.5 %      .3 fL      MCH 29.0 pg      MCHC 28.9 g/dL      RDW 13.2 %      RDW-SD 47.9 fl      MPV 9.4 fL      Platelets 237 10*3/mm3      Neutrophil % 64.6 %      Lymphocyte % 27.6 %      Monocyte % 5.8 %      Eosinophil % 1.1 %      Basophil % 0.6 %      Immature Grans % 0.3 %      Neutrophils, Absolute 4.15 10*3/mm3      Lymphocytes, Absolute 1.77 10*3/mm3      Monocytes, Absolute 0.37 10*3/mm3      Eosinophils, Absolute 0.07 10*3/mm3      Basophils, Absolute 0.04 10*3/mm3      Immature Grans, Absolute 0.02 10*3/mm3      nRBC 0.0 /100  "WBC           Objective:     Vitals: /59 (BP Location: Right arm, Patient Position: Lying)   Pulse 76   Temp 98.3 °F (36.8 °C) (Oral)   Resp 18   Ht 157.5 cm (62.01\")   Wt 83.1 kg (183 lb 3.2 oz)   SpO2 99%   BMI 33.50 kg/m²      Intake/Output Summary (Last 24 hours) at 12/10/2022 06  Last data filed at 12/10/2022 0458  Gross per 24 hour   Intake 1000 ml   Output 1525 ml   Net -525 ml    Temp (24hrs), Av.3 °F (36.8 °C), Min:97.8 °F (36.6 °C), Max:99.1 °F (37.3 °C)    Glucose:  No results found for: POCGLU  Physical Examination:   General appearance - alert, well appearing, and in no distress and oriented to person, place, and time  Mouth - mucous membranes moist, pharynx normal without lesions  Neck - supple, no significant adenopathy  Lymphatics - no palpable lymphadenopathy, no hepatosplenomegaly  Chest - clear to auscultation  Heart - normal rate, regular rhythm, normal S1, S2, no murmurs, rubs, clicks or gallops  Abdomen - soft, nontender, nondistended, no masses or organomegaly bowel sounds normal  Neurological - alert, oriented, normal speech, no focal findings or movement disorder noted  Extremities -no clubbing cyanosis or edema  Skin -warm dry and intact      Assessment and Plan:     Primary Problem:  Lumbar stenosis    Hospital Problem list/ Treatment recommendations:    Lumbar stenosis    Hypertension    Hypothyroidism (acquired)    Drug induced constipation    Perioperative care  Continue bowel regimen, counseled to work with physical and occupational therapy, counseled on appropriate use of incentive spirometry.        Anemia, multifactorial  Iron labs show mild iron deficiency anemia which is probably exacerbated by blood loss from surgery.    Continue iron supplementation     Hypertension  Blood pressure well controlled, no changes     Acute viral syndrome  COVID and flu testing negative.  Probably has some other nonspecific virus, not worth doing respiratory viral panel.  " Symptomatic therapy with Flonase and Mucinex ordered.    Six 67-year-old female history of HTN and hypothyroidism followed by Dr. Hodges for PCP needs.  Doing well postop day #1 step 2 of 2 lumbar surgery.  Labs and vitals stable.  Hemoglobin 9.3.  Judicious use of postoperative pain medication as patient is hypersomnolent this AM.  Will clean up nighttime meds.  Okay to continue Ambien and Benadryl per patient request, DC Desyrel and Remeron.  Needs out of bed, aggressive therapy.  Cleared for discharge when okay with attending.    Reviewed treatment plans with the patient and nursing staff.  20 minutes spent in face to face interaction and coordination of care.   Electronically signed by CIARA Anderson on 12/10/2022 at 06:22 CST

## 2022-12-10 NOTE — THERAPY EVALUATION
Patient Name: Amanda Bhandari  : 1955    MRN: [id number]                              Today's Date: 12/10/2022       Admit Date: 2022    Visit Dx:     ICD-10-CM ICD-9-CM   1. Impaired mobility  Z74.09 799.89     Patient Active Problem List   Diagnosis   • Hypertension   • Lumbar stenosis   • Hypothyroidism (acquired)   • Drug induced constipation     Past Medical History:   Diagnosis Date   • Arthritis    • Disease of thyroid gland    • History of transfusion 1977 tubal pregnancy ruptured   • Hypertension    • Hypothyroidism    • Pulmonary embolism (HCC)      Past Surgical History:   Procedure Laterality Date   • ABLATION OF DYSRHYTHMIC FOCUS      Pre Daugherty's   • ANTERIOR LUMBAR EXPOSURE N/A 2022    Procedure: ANTERIOR LUMBAR EXPOSURE;  Surgeon: MIGUELANGEL Cabral MD;  Location: [facility] OR;  Service: Orthopedic Spine;  Laterality: N/A;   • APPENDECTOMY     • DIAGNOSTIC LAPAROSCOPY EXPLORATORY LAPAROTOMY N/A     FROM HORSE INJURY   • FRACTURE SURGERY Left     ANKLE, PLATE PLACED   • HYSTERECTOMY     • JOINT REPLACEMENT Bilateral    • LUMBAR FUSION N/A 2022    Procedure: ANTERIOR DECOMPRESSION, ANTERIOR LUMBAR INTERBODY FUSION WITH INSTRUMENTATION L5-S1, WITH EXPOSURE AND CLOSURE;  Surgeon: MIGUELANGEL Cabral MD;  Location: [facility] OR;  Service: Orthopedic Spine;  Laterality: N/A;   • SHOULDER SURGERY Left    • WRIST SURGERY        General Information     Row Name 12/10/22 104          Physical Therapy Time and Intention    Document Type evaluation  -SB     Mode of Treatment physical therapy  -SB     Row Name 12/10/22 104          General Information    Patient Profile Reviewed yes  -SB     Prior Level of Function independent:;all household mobility;ADL's;home management;cooking;cleaning;driving  step over tub  -SB     Existing Precautions/Restrictions fall;LSO;brace worn when out of bed;spinal  -SB     Barriers to Rehab medically complex  -SB     Row Name 12/10/22 1040          Living  Environment    People in Home spouse  -SB     Row Name 12/10/22 1040          Home Main Entrance    Number of Stairs, Main Entrance three  -SB     Stair Railings, Main Entrance none  -SB     Row Name 12/10/22 1040          Stairs Within Home, Primary    Number of Stairs, Within Home, Primary none  -SB     Row Name 12/10/22 1040          Cognition    Orientation Status (Cognition) oriented x 4  -SB     Row Name 12/10/22 1040          Safety Issues, Functional Mobility    Safety Issues Affecting Function (Mobility) impulsivity  -SB     Impairments Affecting Function (Mobility) balance;strength;pain;endurance/activity tolerance  -SB           User Key  (r) = Recorded By, (t) = Taken By, (c) = Cosigned By    Initials Name Provider Type    SB Gisel Watters PT DPT Physical Therapist               Mobility     Row Name 12/10/22 1040          Bed Mobility    Bed Mobility rolling left;sidelying-sit  -SB     Rolling Left Ware (Bed Mobility) verbal cues;contact guard  -SB     Sidelying-Sit Ware (Bed Mobility) verbal cues;minimum assist (75% patient effort)  -SB     Assistive Device (Bed Mobility) bed rails  -SB     Row Name 12/10/22 1040          Bed-Chair Transfer    Bed-Chair Ware (Transfers) minimum assist (75% patient effort);verbal cues  -SB     Assistive Device (Bed-Chair Transfers) walker, front-wheeled  -SB     Comment, (Bed-Chair Transfer) took a couple of steps to the chair  -SB     Row Name 12/10/22 1040          Sit-Stand Transfer    Sit-Stand Ware (Transfers) moderate assist (50% patient effort);verbal cues  -SB     Assistive Device (Sit-Stand Transfers) walker, front-wheeled  -SB           User Key  (r) = Recorded By, (t) = Taken By, (c) = Cosigned By    Initials Name Provider Type    Gisel Mahan PT DPT Physical Therapist               Obj/Interventions     Row Name 12/10/22 1040          Range of Motion Comprehensive    General Range of Motion bilateral lower extremity  ROM WFL  -SB     Row Name 12/10/22 1040          Strength Comprehensive (MMT)    General Manual Muscle Testing (MMT) Assessment lower extremity strength deficits identified  -SB     Comment, General Manual Muscle Testing (MMT) Assessment RLE 4-/5; LLE 4/5  -SB     Row Name 12/10/22 1040          Balance    Balance Assessment sitting static balance;sitting dynamic balance;standing static balance;standing dynamic balance  -SB     Static Sitting Balance supervision  -SB     Dynamic Sitting Balance contact guard  -SB     Position, Sitting Balance sitting edge of bed;unsupported  -SB     Static Standing Balance contact guard  -SB     Dynamic Standing Balance minimal assist  -SB     Position/Device Used, Standing Balance walker, front-wheeled  -SB     Row Name 12/10/22 1040          Sensory Assessment (Somatosensory)    Sensory Assessment (Somatosensory) LE sensation intact  -SB           User Key  (r) = Recorded By, (t) = Taken By, (c) = Cosigned By    Initials Name Provider Type    Gisel Mahan, PT DPT Physical Therapist               Goals/Plan     Row Name 12/10/22 1040          Bed Mobility Goal 1 (PT)    Activity/Assistive Device (Bed Mobility Goal 1, PT) rolling to left;rolling to right;bridging;sidelying to sit;sit to sidelying  -SB     Searcy Level/Cues Needed (Bed Mobility Goal 1, PT) standby assist  -SB     Time Frame (Bed Mobility Goal 1, PT) long term goal (LTG)  -SB     Progress/Outcomes (Bed Mobility Goal 1, PT) new goal  -SB     Row Name 12/10/22 1040          Transfer Goal 1 (PT)    Activity/Assistive Device (Transfer Goal 1, PT) sit-to-stand/stand-to-sit;bed-to-chair/chair-to-bed  -SB     Searcy Level/Cues Needed (Transfer Goal 1, PT) standby assist  -SB     Time Frame (Transfer Goal 1, PT) long term goal (LTG)  -SB     Progress/Outcome (Transfer Goal 1, PT) new goal  -SB     Row Name 12/10/22 1040          Gait Training Goal 1 (PT)    Activity/Assistive Device (Gait Training Goal 1, PT)  gait (walking locomotion);increase endurance/gait distance;increase energy conservation;decrease fall risk;diminish gait deviation;walker, rolling  -SB     Dickson Level (Gait Training Goal 1, PT) contact guard required  -SB     Distance (Gait Training Goal 1, PT) 75  -SB     Time Frame (Gait Training Goal 1, PT) long term goal (LTG)  -SB     Progress/Outcome (Gait Training Goal 1, PT) new goal  -SB     Row Name 12/10/22 1040          Patient Education Goal (PT)    Activity (Patient Education Goal, PT) Pt will lionel/doff LSO independently  -SB     Dickson/Cues/Accuracy (Memory Goal 2, PT) independent  -SB     Time Frame (Patient Education Goal, PT) long term goal (LTG)  -SB     Progress/Outcome (Patient Education Goal, PT) new goal  -SB     Row Name 12/10/22 1040          Therapy Assessment/Plan (PT)    Planned Therapy Interventions (PT) balance training;bed mobility training;gait training;orthotic fitting/training;patient/family education;transfer training;strengthening;stair training  -SB           User Key  (r) = Recorded By, (t) = Taken By, (c) = Cosigned By    Initials Name Provider Type    SB Gisel Watters, PT DPT Physical Therapist               Clinical Impression     Row Name 12/10/22 1040          Pain    Pretreatment Pain Rating 3/10  -SB     Pain Location - Side/Orientation Right  -SB     Pain Location lower  -SB     Pain Location - extremity  -SB     Pain Intervention(s) Medication (See MAR);Repositioned;Ambulation/increased activity  -SB     Additional Documentation Pain Scale: Numbers Pre/Post-Treatment (Group)  -SB     Row Name 12/10/22 1040          Plan of Care Review    Plan of Care Reviewed With patient  -SB     Progress no change  -SB     Outcome Evaluation PT eval completed. Pt alert and oriented x4 with c/o 3/10 pain in RLE. Pt recites 3/3 spinal precautions, brace wear schedule and log rolling. Pt reqd CGA-min A to come to EOB with assistance for LE management. Pt with decreased  strength in BLE with greater deficits in RLE as compared to LLE. Pt performed sit to stand t/f with mod A and takes a couple of steps to chair with min A and RW. Pt with forward flexed posture and sustained knee flexion with mobility. Pt will benefit from skilled PT to improve fxl mob and strength. Recommend d/c SNF.  -SB     Row Name 12/10/22 1040          Therapy Assessment/Plan (PT)    Patient/Family Therapy Goals Statement (PT) improve function  -SB     Rehab Potential (PT) good, to achieve stated therapy goals  -SB     Criteria for Skilled Interventions Met (PT) yes;meets criteria;skilled treatment is necessary  -SB     Therapy Frequency (PT) 2 times/day  -SB     Predicted Duration of Therapy Intervention (PT) until d/c or goals met  -SB     Row Name 12/10/22 1040          Vital Signs    O2 Delivery Pre Treatment room air  -SB     Row Name 12/10/22 1040          Positioning and Restraints    Pre-Treatment Position in bed  -SB     Post Treatment Position chair  -SB     In Chair notified nsg;sitting;call light within reach;encouraged to call for assist;with brace  -SB           User Key  (r) = Recorded By, (t) = Taken By, (c) = Cosigned By    Initials Name Provider Type    Gisel Mahan, PT DPT Physical Therapist               Outcome Measures     Row Name 12/10/22 1040          How much help from another person do you currently need...    Turning from your back to your side while in flat bed without using bedrails? 3  -SB     Moving from lying on back to sitting on the side of a flat bed without bedrails? 3  -SB     Moving to and from a bed to a chair (including a wheelchair)? 3  -SB     Standing up from a chair using your arms (e.g., wheelchair, bedside chair)? 3  -SB     Climbing 3-5 steps with a railing? 2  -SB     To walk in hospital room? 3  -SB     AM-PAC 6 Clicks Score (PT) 17  -SB     Highest level of mobility 5 --> Static standing  -SB     Row Name 12/10/22 1040          Functional Assessment     Outcome Measure Options AM-PAC 6 Clicks Basic Mobility (PT)  -SB           User Key  (r) = Recorded By, (t) = Taken By, (c) = Cosigned By    Initials Name Provider Type    Gisel Mahan PT DPT Physical Therapist                             Physical Therapy Education     Title: PT OT SLP Therapies (In Progress)     Topic: Physical Therapy (In Progress)     Point: Mobility training (Done)     Learning Progress Summary           Patient Acceptance, E, VU by SB at 12/10/2022 1052    Comment: pt edu on POC, benefits of act, spina precautions, bracing, d/c plans                   Point: Home exercise program (Not Started)     Learner Progress:  Not documented in this visit.          Point: Body mechanics (Not Started)     Learner Progress:  Not documented in this visit.          Point: Precautions (Done)     Learning Progress Summary           Patient Acceptance, E, VU by SB at 12/10/2022 1052    Comment: pt edu on POC, benefits of act, spina precautions, bracing, d/c plans                               User Key     Initials Effective Dates Name Provider Type Discipline     06/16/21 -  Gisel Watters PT DPT Physical Therapist PT              PT Recommendation and Plan  Planned Therapy Interventions (PT): balance training, bed mobility training, gait training, orthotic fitting/training, patient/family education, transfer training, strengthening, stair training  Plan of Care Reviewed With: patient  Progress: no change  Outcome Evaluation: PT eval completed. Pt alert and oriented x4 with c/o 3/10 pain in RLE. Pt recites 3/3 spinal precautions, brace wear schedule and log rolling. Pt reqd CGA-min A to come to EOB with assistance for LE management. Pt with decreased strength in BLE with greater deficits in RLE as compared to LLE. Pt performed sit to stand t/f with mod A and takes a couple of steps to chair with min A and RW. Pt with forward flexed posture and sustained knee flexion with mobility. Pt will benefit from  skilled PT to improve fxl mob and strength. Recommend d/c SNF.     Time Calculation:    PT Charges     Row Name 12/10/22 1302             Time Calculation    Start Time 1040  -SB      Stop Time 1133  -SB      Time Calculation (min) 53 min  -SB      PT Received On 12/10/22  -SB      PT Goal Re-Cert Due Date 12/20/22  -SB            User Key  (r) = Recorded By, (t) = Taken By, (c) = Cosigned By    Initials Name Provider Type    Gisel Mahan, PT DPT Physical Therapist              Therapy Charges for Today     Code Description Service Date Service Provider Modifiers Qty    [id number] HC PT EVAL LOW COMPLEXITY 4 12/10/2022 Gisel Watters PT DPT GP 1          PT G-Codes  Outcome Measure Options: AM-PAC 6 Clicks Basic Mobility (PT)  AM-PAC 6 Clicks Score (PT): 17  AM-PAC 6 Clicks Score (OT): 16  PT Discharge Summary  Anticipated Discharge Disposition (PT): skilled nursing facility    Gisel Watters PT DPT  12/10/2022

## 2022-12-11 VITALS
OXYGEN SATURATION: 94 % | SYSTOLIC BLOOD PRESSURE: 135 MMHG | HEIGHT: 62 IN | BODY MASS INDEX: 33.71 KG/M2 | RESPIRATION RATE: 18 BRPM | WEIGHT: 183.2 LBS | DIASTOLIC BLOOD PRESSURE: 75 MMHG | TEMPERATURE: 98.2 F | HEART RATE: 86 BPM

## 2022-12-11 PROCEDURE — 97535 SELF CARE MNGMENT TRAINING: CPT

## 2022-12-11 PROCEDURE — 97116 GAIT TRAINING THERAPY: CPT

## 2022-12-11 RX ORDER — HYDROCODONE BITARTRATE AND ACETAMINOPHEN 10; 325 MG/1; MG/1
1 TABLET ORAL EVERY 4 HOURS PRN
Qty: 42 TABLET | Refills: 0 | Status: SHIPPED | OUTPATIENT
Start: 2022-12-11 | End: 2022-12-18

## 2022-12-11 RX ADMIN — FAMOTIDINE 20 MG: 20 TABLET, FILM COATED ORAL at 08:54

## 2022-12-11 RX ADMIN — GUAIFENESIN 600 MG: 600 TABLET, EXTENDED RELEASE ORAL at 08:54

## 2022-12-11 RX ADMIN — LEVOTHYROXINE SODIUM 25 MCG: 25 TABLET ORAL at 06:07

## 2022-12-11 RX ADMIN — FERROUS SULFATE TAB 325 MG (65 MG ELEMENTAL FE) 325 MG: 325 (65 FE) TAB at 08:54

## 2022-12-11 RX ADMIN — HYDROCODONE BITARTRATE AND ACETAMINOPHEN 1 TABLET: 10; 325 TABLET ORAL at 13:13

## 2022-12-11 RX ADMIN — POLYETHYLENE GLYCOL 3350 17 G: 17 POWDER, FOR SOLUTION ORAL at 08:54

## 2022-12-11 RX ADMIN — Medication 3 ML: at 08:54

## 2022-12-11 RX ADMIN — BUPROPION HYDROCHLORIDE 150 MG: 150 TABLET, FILM COATED, EXTENDED RELEASE ORAL at 08:54

## 2022-12-11 RX ADMIN — FLUTICASONE PROPIONATE 2 SPRAY: 50 SPRAY, METERED NASAL at 08:54

## 2022-12-11 RX ADMIN — DOCUSATE SODIUM 50 MG AND SENNOSIDES 8.6 MG 1 TABLET: 8.6; 5 TABLET, FILM COATED ORAL at 08:54

## 2022-12-11 RX ADMIN — HYDROCODONE BITARTRATE AND ACETAMINOPHEN 1 TABLET: 10; 325 TABLET ORAL at 09:02

## 2022-12-11 NOTE — PLAN OF CARE
Goal Outcome Evaluation:              Outcome Evaluation: Reviewed AVS, No bending, lifting, twisting.  Instructed pt to wear brace when OOB.  Discussed s/s of incision infection, DVT and importance of taking medication (exp pain medication as ordered).   IV removed and dressing changed per order.  Instructed pt to make follow up appts as soon as possible.

## 2022-12-11 NOTE — THERAPY TREATMENT NOTE
Acute Care - Physical Therapy Treatment Note  [facility]     Patient Name: Amanda Bhandari  : 1955  MRN: [id number]  Today's Date: 2022      Visit Dx:     ICD-10-CM ICD-9-CM   1. Impaired mobility  Z74.09 799.89     Patient Active Problem List   Diagnosis   • Hypertension   • Lumbar stenosis   • Hypothyroidism (acquired)   • Drug induced constipation     Past Medical History:   Diagnosis Date   • Arthritis    • Disease of thyroid gland    • History of transfusion 1977 tubal pregnancy ruptured   • Hypertension    • Hypothyroidism    • Pulmonary embolism (HCC)      Past Surgical History:   Procedure Laterality Date   • ABLATION OF DYSRHYTHMIC FOCUS      Pre Daugherty's   • ANTERIOR LUMBAR EXPOSURE N/A 2022    Procedure: ANTERIOR LUMBAR EXPOSURE;  Surgeon: MIGUELANGEL Cabral MD;  Location: [facility];  Service: Orthopedic Spine;  Laterality: N/A;   • APPENDECTOMY     • DIAGNOSTIC LAPAROSCOPY EXPLORATORY LAPAROTOMY N/A     FROM HORSE INJURY   • FRACTURE SURGERY Left     ANKLE, PLATE PLACED   • HYSTERECTOMY     • JOINT REPLACEMENT Bilateral    • LUMBAR FUSION N/A 2022    Procedure: ANTERIOR DECOMPRESSION, ANTERIOR LUMBAR INTERBODY FUSION WITH INSTRUMENTATION L5-S1, WITH EXPOSURE AND CLOSURE;  Surgeon: MIGUELANGEL Cabral MD;  Location: [facility];  Service: Orthopedic Spine;  Laterality: N/A;   • SHOULDER SURGERY Left    • WRIST SURGERY       PT Assessment (last 12 hours)     PT Evaluation and Treatment     Row Name 22 0958          Physical Therapy Time and Intention    Subjective Information complains of;pain  -     Document Type therapy note (daily note)  -     Mode of Treatment physical therapy  -     Row Name 22 0958          General Information    Existing Precautions/Restrictions brace worn when out of bed;fall;LSO  -     Row Name 22 0958          Pain    Pretreatment Pain Rating 3/10  -     Posttreatment Pain Rating 3/10  -     Pain Location -  Side/Orientation Right  -DAMIAN     Pain Location lower  -DAMIAN     Pain Location - extremity  -DAMIAN     Pain Intervention(s) Repositioned  -DAMIAN     Row Name 12/11/22 0958          Bed Mobility    Sidelying-Sit [place] (Bed Mobility) standby assist  -DAMIAN     Sit-Sidelying [place] (Bed Mobility) standby assist  -DAMIAN     Row Name 12/11/22 0958          Sit-Stand Transfer    Sit-Stand [place] (Transfers) verbal cues;contact guard  -DAMIAN     Assistive Device (Sit-Stand Transfers) walker, front-wheeled  -DAMIAN     Row Name 12/11/22 0958          Stand-Sit Transfer    Stand-Sit [place] (Transfers) verbal cues;contact guard  -DAMIAN     Row Name 12/11/22 0958          Gait/Stairs (Locomotion)    [place] Level (Gait) verbal cues;contact guard  -DAMIAN     Assistive Device (Gait) walker, front-wheeled  -DAMIAN     Distance in Feet (Gait) 150  -DAMIAN     Deviations/Abnormal Patterns (Gait) gait speed decreased  -DAMIAN     Bilateral Gait Deviations forward flexed posture  -DAMIAN     Row Name             Wound 12/07/22 0845 midline abdomen    Wound - Properties Group Placement Date: 12/07/22  -LASHONDA Placement Time: 0845  -LASHONDA Present on Hospital Admission: N  -LASHONDA Orientation: midline  -LASHONDA Location: abdomen  -LASHONDA Additional Comments: Mastisol, steri strips, telfa, 4x4, tegaderm  -LASHONDA    Retired Wound - Properties Group Placement Date: 12/07/22  -LASHONDA Placement Time: 0845  -LASHONDA Present on Hospital Admission: N  -LASHONDA Orientation: midline  -LASHONDA Location: abdomen  -LASHONDA Additional Comments: Mastisol, steri strips, telfa, 4x4, tegaderm  -LASHONDA    Retired Wound - Properties Group Date first assessed: 12/07/22  -LASHONDA Time first assessed: 0845  -LASHONDA Present on Hospital Admission: N  -LASHONDA Location: abdomen  -LASHONDA Additional Comments: Mastisol, steri strips, telfa, 4x4, tegaderm  -LASHONDA    Row Name             Wound 12/09/22 1006 Bilateral back    Wound - Properties Group Placement Date: 12/09/22  -LASHONDA Placement Time: 1006  -LASHONDA Present on Hospital Admission: N  -LASHONDA Side:  Bilateral  -LASHONDA Location: back  -LASHONDA Additional Comments: Mastisol, steri strips, telfa, 4x4, tape  -LASHONDA    Retired Wound - Properties Group Placement Date: 12/09/22  -LASHONDA Placement Time: 1006  -LASHONDA Present on Hospital Admission: N  -LASHONDA Side: Bilateral  -LASHONDA Location: back  -LASHONDA Additional Comments: Mastisol, steri strips, telfa, 4x4, tape  -LASHONDA    Retired Wound - Properties Group Date first assessed: 12/09/22  -LASHONDA Time first assessed: 1006  -LASHONDA Present on Hospital Admission: N  -LASHONDA Side: Bilateral  -LASHONDA Location: back  -LASHONDA Additional Comments: Mastisol, steri strips, telfa, 4x4, tape  -LASHONDA    Row Name 12/11/22 0958          Positioning and Restraints    Pre-Treatment Position in bed  -DAMIAN     Post Treatment Position bed  -DAMIAN     In Bed fowlers;call light within reach;encouraged to call for assist;exit alarm on;side rails up x2  -DAMIAN           User Key  (r) = Recorded By, (t) = Taken By, (c) = Cosigned By    Initials Name Provider Type    Joseph Hicks, PTA Physical Therapist Assistant    Samia Phillips, RN Registered Nurse                Physical Therapy Education     Title: PT OT SLP Therapies (In Progress)     Topic: Physical Therapy (In Progress)     Point: Mobility training (Done)     Learning Progress Summary           Patient Acceptance, E, VU by DAMIAN at 12/11/2022 0958    Comment: spinal precautions, amb with RWX    Acceptance, E, VU by SB at 12/10/2022 1052    Comment: pt edu on POC, benefits of act, spina precautions, bracing, d/c plans                   Point: Home exercise program (Not Started)     Learner Progress:  Not documented in this visit.          Point: Body mechanics (Not Started)     Learner Progress:  Not documented in this visit.          Point: Precautions (Done)     Learning Progress Summary           Patient Acceptance, E, VU by SB at 12/10/2022 1052    Comment: pt edu on POC, benefits of act, spina precautions, bracing, d/c plans                               User Key     Initials  Effective Dates Name Provider Type Discipline    DAMIAN 12/08/16 -  Joseph Alba PTA Physical Therapist Assistant PT    SB 06/16/21 -  Gisel Watters, PT DPT Physical Therapist PT              PT Recommendation and Plan     Plan of Care Reviewed With: patient  Progress: improving  Outcome Evaluation: Pt was sitting EOB, assistance to lionel LSO.  Transfered sit to stand with CGA.  Amb 150' with RWX and CGA.  Performed bed mobility with SBA.  Will continue to work with pt to increase strength and progress amb.   Outcome Measures     Row Name 12/11/22 0958             How much help from another person do you currently need...    Turning from your back to your side while in flat bed without using bedrails? 3  -DAMIAN      Moving from lying on back to sitting on the side of a flat bed without bedrails? 3  -DAMIAN      Moving to and from a bed to a chair (including a wheelchair)? 3  -DAMIAN      Standing up from a chair using your arms (e.g., wheelchair, bedside chair)? 3  -DAMIAN      Climbing 3-5 steps with a railing? 2  -DAMIAN      To walk in hospital room? 3  -DAMIAN      AM-PAC 6 Clicks Score (PT) 17  -DAMIAN         Functional Assessment    Outcome Measure Options AM-PAC 6 Clicks Basic Mobility (PT)  -DAMIAN            User Key  (r) = Recorded By, (t) = Taken By, (c) = Cosigned By    Initials Name Provider Type    Joseph Hicks PTA Physical Therapist Assistant                 Time Calculation:    PT Charges     Row Name 12/11/22 0958             Time Calculation    Start Time 0958  -DAMIAN      Stop Time 1022  -DAMIAN      Time Calculation (min) 24 min  -DAMIAN      PT Received On 12/11/22  -DAMIAN         Time Calculation- PT    Total Timed Code Minutes- PT 24 minute(s)  -DAMIAN         Timed Charges    47361 - Gait Training Minutes  24  -DAMIAN         Total Minutes    Timed Charges Total Minutes 24  -DAMIAN       Total Minutes 24  -DAMIAN            User Key  (r) = Recorded By, (t) = Taken By, (c) = Cosigned By    Initials Name Provider Type    Joseph Hicks,  PTA Physical Therapist Assistant              Therapy Charges for Today     Code Description Service Date Service Provider Modifiers Qty    48944901673 HC GAIT TRAINING EA 15 MIN 12/10/2022 Joseph Alba, LYNN GP 1    58894005969 HC GAIT TRAINING EA 15 MIN 12/11/2022 Joseph Alba, LYNN GP 2          PT G-Codes  Outcome Measure Options: AM-PAC 6 Clicks Basic Mobility (PT)  AM-PAC 6 Clicks Score (PT): 17  AM-PAC 6 Clicks Score (OT): 16    Joseph Alba PTA  12/11/2022

## 2022-12-11 NOTE — DISCHARGE SUMMARY
Date of Discharge:  12/11/2022    Admission Diagnosis: M54.16    Discharge Diagnosis:   1. Low back pain.  2. Right buttock, thigh and leg radiculopathy.   3. Multilevel lumbar degenerative disc disease, worse L5-S1.   4. Multilevel lumbar facet arthropathy, worse right L5-S1.   5. Thoracolumbar degenerative scoliosis, concave left T11 to L4, concave right L5-S1.   6. Right-sided foraminal stenosis L5-S1.   7. Possible right sacroiliitis.  8.  Status post anterior decompression, ALIF with instrumentation L5-S1, 12/7/2022  9.  Status post PSF with instrumentation L5-S1, 12/9/2022      Consults During Admission: Dr. Lazo    Hospital Course  Patient is a 67 y.o. female Known to our practice. Admitted for the above staged fusion.  This has been well tolerated and the patient will be discharged home today in good stable condition with instructions for brace when out of bed.  No driving until directed.  Patient will follow-up with Dr. Cabral's clinic in two weeks. They will call if problems arise.         Condition on Discharge:  STABLE    Vital Signs  Temp:  [97.9 °F (36.6 °C)-98.8 °F (37.1 °C)] 98.2 °F (36.8 °C)  Heart Rate:  [] 86  Resp:  [18] 18  BP: (131-149)/(65-82) 135/75    Physical Exam:   Alert and oriented ×3, no acute distress, grossly neurovascularly intact, vital signs stable, dressing clean dry and intact, moving all extremities without focal deficit      Discharge Disposition      Discharge Medications     Discharge Medications      ASK your doctor about these medications      Instructions Start Date   buPROPion  MG 24 hr tablet  Commonly known as: WELLBUTRIN XL   150 mg, Oral, Daily      levothyroxine 25 MCG tablet  Commonly known as: SYNTHROID, LEVOTHROID   25 mcg, Oral, Daily      mirtazapine 7.5 MG half tablet  Commonly known as: REMERON   7.5 mg, Oral, Nightly      traMADol 50 MG tablet  Commonly known as: ULTRAM   50 mg, Oral, Every 8 Hours PRN      traZODone 50 MG tablet  Commonly  known as: DESYREL   50 mg, Oral, Nightly      zolpidem 10 MG tablet  Commonly known as: AMBIEN   10 mg, Oral, Nightly             Discharge Diet: Resume Home diet, advance as tolerated    Activity at Discharge: Resume home activity, advance as tolerated, no lifting, no twisting, no bending, brace as directed, no driving until directed.     Follow-up Appointments  Followup with PCP within one week  Followup [facility] at 2 weeks post-op         Yuri Webb PA-C  12/11/22  14:51 CST

## 2022-12-11 NOTE — THERAPY TREATMENT NOTE
Acute Care - Physical Therapy Treatment Note  [facility]     Patient Name: Amanda Bhandari  : 1955  MRN: [id number]  Today's Date: 2022      Visit Dx:     ICD-10-CM ICD-9-CM   1. Impaired mobility  Z74.09 799.89   2. Deficits in activities of daily living  Z78.9 V49.89     Patient Active Problem List   Diagnosis   • Hypertension   • Lumbar stenosis   • Hypothyroidism (acquired)   • Drug induced constipation     Past Medical History:   Diagnosis Date   • Arthritis    • Disease of thyroid gland    • History of transfusion 1977 tubal pregnancy ruptured   • Hypertension    • Hypothyroidism    • Pulmonary embolism (HCC)      Past Surgical History:   Procedure Laterality Date   • ABLATION OF DYSRHYTHMIC FOCUS      Pre Daugherty's   • ANTERIOR LUMBAR EXPOSURE N/A 2022    Procedure: ANTERIOR LUMBAR EXPOSURE;  Surgeon: MIGUELANGEL Cabral MD;  Location: [facility];  Service: Orthopedic Spine;  Laterality: N/A;   • APPENDECTOMY     • DIAGNOSTIC LAPAROSCOPY EXPLORATORY LAPAROTOMY N/A     FROM HORSE INJURY   • FRACTURE SURGERY Left     ANKLE, PLATE PLACED   • HYSTERECTOMY     • JOINT REPLACEMENT Bilateral    • LUMBAR FUSION N/A 2022    Procedure: ANTERIOR DECOMPRESSION, ANTERIOR LUMBAR INTERBODY FUSION WITH INSTRUMENTATION L5-S1, WITH EXPOSURE AND CLOSURE;  Surgeon: MIGUELANGEL Cabral MD;  Location: [facility];  Service: Orthopedic Spine;  Laterality: N/A;   • SHOULDER SURGERY Left    • WRIST SURGERY       PT Assessment (last 12 hours)     PT Evaluation and Treatment     Row Name 22 1435 22 0958       Physical Therapy Time and Intention    Subjective Information complains of;pain  -DAMIAN complains of;pain  -DAMIAN    Document Type therapy note (daily note)  -DAMIAN therapy note (daily note)  -DAMIAN    Mode of Treatment physical therapy  -DAMIAN physical therapy  -DAMIAN    Row Name 22 1435 22 0958       General Information    Existing Precautions/Restrictions brace worn when out of bed;fall;LSO  -DAMIAN  brace worn when out of bed;fall;LSO  -DAMIAN    Row Name 12/11/22 1435 12/11/22 0958       Pain    Pretreatment Pain Rating 4/10  -DAMIAN 3/10  -DAMIAN    Posttreatment Pain Rating 4/10  -DAMIAN 3/10  -DAMIAN    Pain Location - Side/Orientation Right  -DAMIAN Right  -DAMIAN    Pain Location -- lower  -DAMIAN    Pain Location - hip  -DAMIAN extremity  -DAMIAN    Pain Intervention(s) Repositioned  -DAMIAN Repositioned  -DAMIAN    Row Name 12/11/22 1435 12/11/22 0958       Bed Mobility    Sidelying-Sit [place] (Bed Mobility) supervision  -DAMIAN standby assist  -DAMIAN    Sit-Sidelying [place] (Bed Mobility) -- standby assist  -DAMIAN    Row Name 12/11/22 1435 12/11/22 0958       Sit-Stand Transfer    Sit-Stand [place] (Transfers) supervision  -DAMIAN verbal cues;contact guard  -DAMIAN    Assistive Device (Sit-Stand Transfers) walker, front-wheeled  -DAMIAN walker, front-wheeled  -DAMIAN    Row Name 12/11/22 1435 12/11/22 0958       Stand-Sit Transfer    Stand-Sit [place] (Transfers) supervision  -DAMIAN verbal cues;contact guard  -DAMIAN    Row Name 12/11/22 1435 12/11/22 0958       Gait/Stairs (Locomotion)    [place] Level (Gait) standby assist  -DAMIAN verbal cues;contact guard  -DAMIAN    Assistive Device (Gait) walker, front-wheeled  -DAMIAN walker, front-wheeled  -DAMIAN    Distance in Feet (Gait) 200  -DAMIAN 150  -DAMIAN    Deviations/Abnormal Patterns (Gait) -- gait speed decreased  -DAMIAN    Bilateral Gait Deviations -- forward flexed posture  -DAMIAN    Row Name             Wound 12/07/22 0845 midline abdomen    Wound - Properties Group Placement Date: 12/07/22  -LASHONDA Placement Time: 0845  -LASHONDA Present on Hospital Admission: N  -LASHONDA Orientation: midline  -LASHONDA Location: abdomen  -LASHONDA Additional Comments: Mastisol, steri strips, telfa, 4x4, tegaderm  -LASHONDA    Retired Wound - Properties Group Placement Date: 12/07/22  -LASHONDA Placement Time: 0845  -LASHONDA Present on Hospital Admission: N  -LASHONDA Orientation: midline  -LASHONDA Location: abdomen  -LASHONDA Additional Comments: Mastisol, steri strips, telfa, 4x4, tegaderm  -LASHONDA     Retired Wound - Properties Group Date first assessed: 12/07/22  -LASHONDA Time first assessed: 0845  -LASHONDA Present on Hospital Admission: N  -LASHONDA Location: abdomen  -LASHONDA Additional Comments: Mastisol, steri strips, telfa, 4x4, tegaderm  -LASHONDA    Row Name             Wound 12/09/22 1006 Bilateral back    Wound - Properties Group Placement Date: 12/09/22  -LASHONDA Placement Time: 1006  -LASHONDA Present on Hospital Admission: N  -LASHONDA Side: Bilateral  -LASHONDA Location: back  -LASHONDA Additional Comments: Mastisol, steri strips, telfa, 4x4, tape  -LASHONDA    Retired Wound - Properties Group Placement Date: 12/09/22  -LASHONDA Placement Time: 1006  -LASHONDA Present on Hospital Admission: N  -LASHONDA Side: Bilateral  -LASHONDA Location: back  -LASHONDA Additional Comments: Mastisol, steri strips, telfa, 4x4, tape  -LASHONDA    Retired Wound - Properties Group Date first assessed: 12/09/22  -LASHONDA Time first assessed: 1006  -LASHONDA Present on Hospital Admission: N  -LASHONDA Side: Bilateral  -LASHONDA Location: back  -LASHONDA Additional Comments: Mastisol, steri strips, telfa, 4x4, tape  -LASHONDA    Row Name 12/11/22 1435 12/11/22 0958       Positioning and Restraints    Pre-Treatment Position in bed  -DAMIAN in bed  -DAMIAN    Post Treatment Position bed  -DAMIAN bed  -DAMIAN    In Bed sitting EOB;call light within reach;encouraged to call for assist  -DAMIAN fowlers;call light within reach;encouraged to call for assist;exit alarm on;side rails up x2  -DAMIAN          User Key  (r) = Recorded By, (t) = Taken By, (c) = Cosigned By    Initials Name Provider Type    Joseph Hicks, LYNN Physical Therapist Assistant    Samia Phillips RN Registered Nurse                Physical Therapy Education     Title: PT OT SLP Therapies (In Progress)     Topic: Physical Therapy (In Progress)     Point: Mobility training (Done)     Learning Progress Summary           Patient Acceptance, E, VU by DAMIAN at 12/11/2022 0958    Comment: spinal precautions, amb with RWX    Acceptance, E, VU by SB at 12/10/2022 1052    Comment: pt edu on POC, benefits of  act, spina precautions, bracing, d/c plans                   Point: Home exercise program (Not Started)     Learner Progress:  Not documented in this visit.          Point: Body mechanics (Not Started)     Learner Progress:  Not documented in this visit.          Point: Precautions (Done)     Learning Progress Summary           Patient Acceptance, LISA, VU by SB at 12/10/2022 1052    Comment: pt edu on POC, benefits of act, spina precautions, bracing, d/c plans                               User Key     Initials Effective Dates Name Provider Type Discipline    DAMIAN 12/08/16 -  Joseph Alba PTA Physical Therapist Assistant PT    SB 06/16/21 -  Gisel Watters, KENDRICK DPT Physical Therapist PT              PT Recommendation and Plan     Plan of Care Reviewed With: patient  Progress: improving  Outcome Evaluation: Pt was sitting EOB, assistance to lionel LSO.  Transfered sit to stand with CGA.  Amb 150' with RWX and CGA.  Performed bed mobility with SBA.  Will continue to work with pt to increase strength and progress amb.   Outcome Measures     Row Name 12/11/22 1000 12/11/22 0958          How much help from another person do you currently need...    Turning from your back to your side while in flat bed without using bedrails? -- 3  -DAMIAN     Moving from lying on back to sitting on the side of a flat bed without bedrails? -- 3  -DAMIAN     Moving to and from a bed to a chair (including a wheelchair)? -- 3  -DAMIAN     Standing up from a chair using your arms (e.g., wheelchair, bedside chair)? -- 3  -DAMIAN     Climbing 3-5 steps with a railing? -- 2  -DAMIAN     To walk in hospital room? -- 3  -DAMIAN     AM-PAC 6 Clicks Score (PT) -- 17  -DAMIAN        How much help from another is currently needed...    Putting on and taking off regular lower body clothing? 2  -LS --     Bathing (including washing, rinsing, and drying) 2  -LS --     Toileting (which includes using toilet bed pan or urinal) 4  -LS --     Putting on and taking off regular upper body  clothing 4  -LS --     Taking care of personal grooming (such as brushing teeth) 4  -LS --     Eating meals 4  -LS --     AM-PAC 6 Clicks Score (OT) 20  -LS --        Functional Assessment    Outcome Measure Options AM-PAC 6 Clicks Daily Activity (OT)  -LS AM-PAC 6 Clicks Basic Mobility (PT)  -DAMIAN           User Key  (r) = Recorded By, (t) = Taken By, (c) = Cosigned By    Initials Name Provider Type    Joseph Hicks PTA Physical Therapist Assistant    Emelyn Hoffman COTA Occupational Therapist Assistant                 Time Calculation:    PT Charges     Row Name 12/11/22 1435 12/11/22 0958          Time Calculation    Start Time 1435  -DAMIAN 0958  -DAMIAN     Stop Time 1445  -DAMIAN 1022  -DAMIAN     Time Calculation (min) 10 min  -DAMIAN 24 min  -DAMIAN     PT Received On 12/11/22  -DAMIAN 12/11/22  -DAMIAN        Time Calculation- PT    Total Timed Code Minutes- PT 10 minute(s)  -DAMIAN 24 minute(s)  -DAMIAN        Timed Charges    83791 - Gait Training Minutes  10  -DAMIAN 24  -DAMIAN        Total Minutes    Timed Charges Total Minutes 10  -DAMIAN 24  -DAMIAN      Total Minutes 10  -DAMIAN 24  -DAMIAN           User Key  (r) = Recorded By, (t) = Taken By, (c) = Cosigned By    Initials Name Provider Type    Joseph Hicks PTA Physical Therapist Assistant              Therapy Charges for Today     Code Description Service Date Service Provider Modifiers Qty    05522326104 HC GAIT TRAINING EA 15 MIN 12/10/2022 Joseph Alba PTA GP 1    31537455382 HC GAIT TRAINING EA 15 MIN 12/11/2022 Joseph Alba PTA GP 2    79359046049 HC GAIT TRAINING EA 15 MIN 12/11/2022 Joseph Alba, LYNN GP 1          PT G-Codes  Outcome Measure Options: AM-PAC 6 Clicks Daily Activity (OT)  AM-PAC 6 Clicks Score (PT): 17  AM-PAC 6 Clicks Score (OT): 20    Joseph Alba PTA  12/11/2022

## 2022-12-11 NOTE — PLAN OF CARE
Goal Outcome Evaluation:  Plan of Care Reviewed With: patient        Progress: improving  Outcome Evaluation: OT tx completed on this date. Pt is A&O x4. Pt fowlers in bed, A&O x4, with c/o pain of 5/10 in R hip. Pt fowlers in bed. Pt completed bed mobility with log roll technique brothers <> EOB requiring SBA with log roll technique. Pt able to lionel/doff LSO independently. Pt completed functional mobility to and from bathroom with RW requiring CGA. Pt completed toileting tasks in bathroom requiring SBA. Pt completed ambulatory level grooming tasks of combing hair and oral regimen tolerated standing intervals of 3-5 minutes of dynamic activity requiring SBA. HH recommended upon discharge. OT POC to continue

## 2022-12-11 NOTE — PLAN OF CARE
Goal Outcome Evaluation:  Plan of Care Reviewed With: patient        Progress: improving  Outcome Evaluation: Pt was sitting EOB, assistance to lionel LSO.  Transfered sit to stand with CGA.  Amb 150' with RWX and CGA.  Performed bed mobility with SBA.  Will continue to work with pt to increase strength and progress amb.

## 2022-12-11 NOTE — THERAPY TREATMENT NOTE
Acute Care - Occupational Therapy Treatment Note   [facility]     Patient Name: Amanda Bhandari  : 1955  MRN: [id number]  Today's Date: 2022             Admit Date: 2022       ICD-10-CM ICD-9-CM   1. Impaired mobility  Z74.09 799.89   2. Deficits in activities of daily living  Z78.9 V49.89     Patient Active Problem List   Diagnosis   • Hypertension   • Lumbar stenosis   • Hypothyroidism (acquired)   • Drug induced constipation     Past Medical History:   Diagnosis Date   • Arthritis    • Disease of thyroid gland    • History of transfusion 1977 tubal pregnancy ruptured   • Hypertension    • Hypothyroidism    • Pulmonary embolism (HCC)      Past Surgical History:   Procedure Laterality Date   • ABLATION OF DYSRHYTHMIC FOCUS      Pre Daugherty's   • ANTERIOR LUMBAR EXPOSURE N/A 2022    Procedure: ANTERIOR LUMBAR EXPOSURE;  Surgeon: MIGUELANGEL Cabral MD;  Location: [facility];  Service: Orthopedic Spine;  Laterality: N/A;   • APPENDECTOMY     • DIAGNOSTIC LAPAROSCOPY EXPLORATORY LAPAROTOMY N/A     FROM HORSE INJURY   • FRACTURE SURGERY Left     ANKLE, PLATE PLACED   • HYSTERECTOMY     • JOINT REPLACEMENT Bilateral    • LUMBAR FUSION N/A 2022    Procedure: ANTERIOR DECOMPRESSION, ANTERIOR LUMBAR INTERBODY FUSION WITH INSTRUMENTATION L5-S1, WITH EXPOSURE AND CLOSURE;  Surgeon: MIGUELANGEL Cabral MD;  Location: [facility];  Service: Orthopedic Spine;  Laterality: N/A;   • SHOULDER SURGERY Left    • WRIST SURGERY           OT ASSESSMENT FLOWSHEET (last 12 hours)     OT Evaluation and Treatment     Row Name 22 1022                   OT Time and Intention    Subjective Information complains of;pain  -LS        Document Type therapy note (daily note)  -LS        Mode of Treatment occupational therapy  -LS        Patient Effort good  -LS           General Information    Patient Profile Reviewed yes  -LS        Existing Precautions/Restrictions brace worn when out of bed;fall;LSO  -LS            Pain Assessment    Pretreatment Pain Rating 5/10  -LS        Posttreatment Pain Rating 5/10  -LS        Pain Location - Side/Orientation Right  -LS        Pain Location - hip  -LS        Pain Intervention(s) Ambulation/increased activity  -LS           Cognition    Orientation Status (Cognition) oriented x 4  -LS           Wound 12/07/22 0845 midline abdomen    Wound - Properties Group Placement Date: 12/07/22  -LASHONDA Placement Time: 0845  -LASHONDA Present on Hospital Admission: N  -LASHONDA Orientation: midline  -LASHONDA Location: abdomen  -LASHONDA Additional Comments: Mastisol, steri strips, telfa, 4x4, tegaderm  -LASHONDA    Retired Wound - Properties Group Placement Date: 12/07/22  -LASHONDA Placement Time: 0845  -LASHONDA Present on Hospital Admission: N  -LASHONDA Orientation: midline  -LASHONDA Location: abdomen  -LASHONDA Additional Comments: Mastisol, steri strips, telfa, 4x4, tegaderm  -LASHONDA    Retired Wound - Properties Group Date first assessed: 12/07/22  -LASHONDA Time first assessed: 0845  -LASHONDA Present on Hospital Admission: N  -LASHONDA Location: abdomen  -LASHONDA Additional Comments: Mastisol, steri strips, telfa, 4x4, tegaderm  -LASHONDA       Wound 12/09/22 1006 Bilateral back    Wound - Properties Group Placement Date: 12/09/22  -LASHONDA Placement Time: 1006  -LASHONDA Present on Hospital Admission: N  -LASHONDA Side: Bilateral  -LASHONDA Location: back  -LASHONDA Additional Comments: Mastisol, steri strips, telfa, 4x4, tape  -LASHONDA    Retired Wound - Properties Group Placement Date: 12/09/22  -LASHONDA Placement Time: 1006  -LASHONDA Present on Hospital Admission: N  -LASHONDA Side: Bilateral  -LASHONDA Location: back  -LASHONDA Additional Comments: Mastisol, steri strips, telfa, 4x4, tape  -LASHONDA    Retired Wound - Properties Group Date first assessed: 12/09/22  -LASHONDA Time first assessed: 1006  -LASHONDA Present on Hospital Admission: N  -LASHONDA Side: Bilateral  -LASHONDA Location: back  -LASHONDA Additional Comments: Mastisol, steri strips, telfa, 4x4, tape  -LASHONDA       Coping    Observed Emotional State calm;cooperative  -LS        Verbalized Emotional State  acceptance  -LS           Plan of Care Review    Plan of Care Reviewed With patient  -LS        Progress improving  -LS        Outcome Evaluation OT tx completed on this date. Pt is A&O x4. Pt fowlers in bed, A&O x4, with c/o pain of 5/10 in R hip. Pt fowlers in bed. Pt completed bed mobility with log roll technique brothers <> EOB requiring SBA with log roll technique. Pt able to lionel/doff LSO independently. Pt completed functional mobility to and from bathroom with RW requiring CGA. Pt completed toileting tasks in bathroom requiring SBA. Pt completed ambulatory level grooming tasks of combing hair and oral regimen tolerated standing intervals of 3-5 minutes of dynamic activity requiring SBA. HH recommended upon discharge. OT POC to continue  -LS           Vital Signs    O2 Delivery Pre Treatment room air  -LS        Pre Patient Position Supine  -LS        Intra Patient Position Standing  -LS        Post Patient Position Supine  -LS           Positioning and Restraints    Pre-Treatment Position in bed  -LS        Post Treatment Position bed  -LS        In Bed fowlers;call light within reach;encouraged to call for assist;exit alarm on  -LS           Therapy Plan Review/Discharge Plan (OT)    Anticipated Discharge Disposition (OT) home with 24/7 care;home with home health  -              User Key  (r) = Recorded By, (t) = Taken By, (c) = Cosigned By    Initials Name Effective Dates    Samia Phillips RN 02/17/22 -      Emelyn Cai COTA 09/22/22 -                  Occupational Therapy Education     Title: PT OT SLP Therapies (In Progress)     Topic: Occupational Therapy (In Progress)     Point: ADL training (Done)     Description:   Instruct learner(s) on proper safety adaptation and remediation techniques during self care or transfers.   Instruct in proper use of assistive devices.              Learning Progress Summary           Patient Acceptance, E,D, VU by  at 12/11/2022 1050    Comment: Pt  educated on safety with RW with good carryover.    Acceptance, E,D, VU,NR by  at 12/9/2022 1611                   Point: Home exercise program (Not Started)     Description:   Instruct learner(s) on appropriate technique for monitoring, assisting and/or progressing therapeutic exercises/activities.              Learner Progress:  Not documented in this visit.          Point: Precautions (Done)     Description:   Instruct learner(s) on prescribed precautions during self-care and functional transfers.              Learning Progress Summary           Patient Acceptance, E,D, VU,NR by  at 12/9/2022 1611                   Point: Body mechanics (Done)     Description:   Instruct learner(s) on proper positioning and spine alignment during self-care, functional mobility activities and/or exercises.              Learning Progress Summary           Patient Acceptance, E,D, VU,NR by  at 12/9/2022 1611                               User Key     Initials Effective Dates Name Provider Type Discipline     06/16/21 -  Arabella Altman, OTR/L Occupational Therapist OT     09/22/22 -  Emelyn Cai COTA Occupational Therapist Assistant THERAPIES                  OT Recommendation and Plan     Plan of Care Review  Plan of Care Reviewed With: patient  Progress: improving  Outcome Evaluation: OT tx completed on this date. Pt is A&O x4. Pt fowlers in bed, A&O x4, with c/o pain of 5/10 in R hip. Pt fowlers in bed. Pt completed bed mobility with log roll technique brothers <> EOB requiring SBA with log roll technique. Pt able to lionel/doff LSO independently. Pt completed functional mobility to and from bathroom with RW requiring CGA. Pt completed toileting tasks in bathroom requiring SBA. Pt completed ambulatory level grooming tasks of combing hair and oral regimen tolerated standing intervals of 3-5 minutes of dynamic activity requiring SBA. HH recommended upon discharge. OT POC to continue  Plan of Care Reviewed With:  patient  Outcome Evaluation: OT tx completed on this date. Pt is A&O x4. Pt fowlers in bed, A&O x4, with c/o pain of 5/10 in R hip. Pt fowlers in bed. Pt completed bed mobility with log roll technique brothers <> EOB requiring SBA with log roll technique. Pt able to lionel/doff LSO independently. Pt completed functional mobility to and from bathroom with RW requiring CGA. Pt completed toileting tasks in bathroom requiring SBA. Pt completed ambulatory level grooming tasks of combing hair and oral regimen tolerated standing intervals of 3-5 minutes of dynamic activity requiring SBA. HH recommended upon discharge. OT POC to continue     Outcome Measures     Row Name 12/11/22 1000 12/11/22 0958          How much help from another person do you currently need...    Turning from your back to your side while in flat bed without using bedrails? -- 3  -DAMIAN     Moving from lying on back to sitting on the side of a flat bed without bedrails? -- 3  -DAMIAN     Moving to and from a bed to a chair (including a wheelchair)? -- 3  -DAMIAN     Standing up from a chair using your arms (e.g., wheelchair, bedside chair)? -- 3  -DAMIAN     Climbing 3-5 steps with a railing? -- 2  -DAMIAN     To walk in hospital room? -- 3  -DAMIAN     AM-PAC 6 Clicks Score (PT) -- 17  -DAMIAN        How much help from another is currently needed...    Putting on and taking off regular lower body clothing? 2  -LS --     Bathing (including washing, rinsing, and drying) 2  -LS --     Toileting (which includes using toilet bed pan or urinal) 4  -LS --     Putting on and taking off regular upper body clothing 4  -LS --     Taking care of personal grooming (such as brushing teeth) 4  -LS --     Eating meals 4  -LS --     AM-PAC 6 Clicks Score (OT) 20  -LS --        Functional Assessment    Outcome Measure Options AM-PAC 6 Clicks Daily Activity (OT)  -LS AM-PAC 6 Clicks Basic Mobility (PT)  -DAMIAN           User Key  (r) = Recorded By, (t) = Taken By, (c) = Cosigned By    Initials Name  Provider Type    Joseph Hicks PTA Physical Therapist Assistant    Emelyn Hoffman COTA Occupational Therapist Assistant                Time Calculation:    Time Calculation- OT     Row Name 12/11/22 1051 12/11/22 0958          Time Calculation- OT    OT Start Time 1022  -LS --     OT Stop Time 1052  -LS --     OT Time Calculation (min) 30 min  -LS --     Total Timed Code Minutes- OT 30 minute(s)  - --     OT Received On 12/11/22  - --        Timed Charges    70270 - Gait Training Minutes  -- 24  -DAMIAN        Total Minutes    Timed Charges Total Minutes -- 24  -DAMIAN      Total Minutes -- 24  -DAMIAN           User Key  (r) = Recorded By, (t) = Taken By, (c) = Cosigned By    Initials Name Provider Type    Joseph Hicks PTA Physical Therapist Assistant     Emelyn Cai COTA Occupational Therapist Assistant              Therapy Charges for Today     Code Description Service Date Service Provider Modifiers Qty    62920175012 HC OT SELF CARE/MGMT/TRAIN EA 15 MIN 12/11/2022 Emelyn Cai COTA GO 2               SRIDHAR Cristina  12/11/2022

## 2022-12-11 NOTE — PLAN OF CARE
Goal Outcome Evaluation:  Plan of Care Reviewed With: patient        Progress: improving  Outcome Evaluation: Patient is alert and oriented times 4, up with assist of one and LSO brace, denies any numbness or tingling, complaints of some discomfort to RLE, medicated early in the shift with tylenol per patient request, dressings CDI to back and lower abd. rested well most of this shift, GUERRERO, VSS safety maintained.

## 2022-12-11 NOTE — DISCHARGE INSTR - ACTIVITY
Activity at Discharge: Resume home activity, advance as tolerated, no lifting, no twisting, no bending, brace as directed, no driving until directed.

## 2022-12-11 NOTE — PROGRESS NOTES
Daily Progress Note  Amanda Bhandari  MRN: [id number] LOS: 4    Admit Date: 2022 06:41 CST          Chief Complaint:  Lumbar stenosis with radiculopathy    Interval History:    Reviewed overnight events and nursing notes.   Status:  better than before  Postoperative pain is moderately controlled.  Yesterday nursing notes revealed pt drowsy most of shift.   c/o pain right lower extremity nerve pain.   Sleeping heavily, no new complaints this AM  Denies chest pain  No shortness of breath  COVID and flu test negative        DIET:  Diet: Regular/House Diet; Texture: Regular Texture (IDDSI 7); Fluid Consistency: Thin (IDDSI 0)    Medications:      buPROPion XL, 150 mg, Oral, Daily  famotidine, 20 mg, Intravenous, Q12H   Or  famotidine, 20 mg, Oral, Q12H  ferrous sulfate, 325 mg, Oral, Daily With Breakfast  fluticasone, 2 spray, Each Nare, Daily  guaiFENesin, 600 mg, Oral, Q12H  levothyroxine, 25 mcg, Oral, Q AM  polyethylene glycol, 17 g, Oral, TID  senna-docusate sodium, 1 tablet, Oral, BID  sodium chloride, 3 mL, Intravenous, Q12H  zolpidem, 10 mg, Oral, Nightly        Data:     Code Status:   Code Status and Medical Interventions:   Ordered at: 22 1310     Code Status (Patient has no pulse and is not breathing):    CPR (Attempt to Resuscitate)     Medical Interventions (Patient has pulse or is breathing):    Full       Family History   Problem Relation Age of Onset   • Cancer Mother         Ovarian   • Heart attack Mother    • Cancer Father         Esophageal cancer, prostate cancer     Social History     Socioeconomic History   • Marital status:    Tobacco Use   • Smoking status: Former     Packs/day: 1.50     Years: 15.00     Pack years: 22.50     Types: Cigarettes     Quit date: 2014     Years since quittin.9   • Smokeless tobacco: Never   Vaping Use   • Vaping Use: Never used   Substance and Sexual Activity   • Alcohol use: Not Currently     Comment: VERY SELDOM   • Drug  use: Never   • Sexual activity: Not Currently     Partners: Male     Birth control/protection: Hysterectomy       Labs:  Lab Results (last 72 hours)     Procedure Component Value Units Date/Time    Basic Metabolic Panel [[id number]]  (Abnormal) Collected: 12/09/22 0618    Specimen: Blood Updated: 12/09/22 0650     Glucose 113 mg/dL      BUN 10 mg/dL      Creatinine 1.06 mg/dL      Sodium 138 mmol/L      Potassium 3.9 mmol/L      Chloride 104 mmol/L      CO2 25.0 mmol/L      Calcium 8.7 mg/dL      BUN/Creatinine Ratio 9.4     Anion Gap 9.0 mmol/L      eGFR 58.1 mL/min/1.73      Comment: National Kidney Foundation and American Society of Nephrology (ASN) Task Force recommended calculation based on the Chronic Kidney Disease Epidemiology Collaboration (CKD-EPI) equation refit without adjustment for race.       Narrative:      GFR Normal >60  Chronic Kidney Disease <60  Kidney Failure <15      CBC & Differential [[id number]]  (Abnormal) Collected: 12/09/22 0618    Specimen: Blood Updated: 12/09/22 0630    Narrative:      The following orders were created for panel order CBC & Differential.  Procedure                               Abnormality         Status                     ---------                               -----------         ------                     CBC Auto Differential[[id number]]        Abnormal            Final result                 Please view results for these tests on the individual orders.    CBC Auto Differential [[id number]]  (Abnormal) Collected: 12/09/22 0618    Specimen: Blood Updated: 12/09/22 0630     WBC 8.07 10*3/mm3      RBC 3.19 10*6/mm3      Hemoglobin 9.3 g/dL      Hematocrit 30.8 %      MCV 96.6 fL      MCH 29.2 pg      MCHC 30.2 g/dL      RDW 12.8 %      RDW-SD 45.1 fl      MPV 9.8 fL      Platelets 227 10*3/mm3      Neutrophil % 77.6 %      Lymphocyte % 14.7 %      Monocyte % 6.3 %      Eosinophil % 0.7 %      Basophil % 0.2 %      Immature Grans % 0.5 %      Neutrophils, Absolute 6.25  10*3/mm3      Lymphocytes, Absolute 1.19 10*3/mm3      Monocytes, Absolute 0.51 10*3/mm3      Eosinophils, Absolute 0.06 10*3/mm3      Basophils, Absolute 0.02 10*3/mm3      Immature Grans, Absolute 0.04 10*3/mm3      nRBC 0.0 /100 WBC     Folate [[id number]]  (Abnormal) Collected: 12/08/22 0528    Specimen: Blood Updated: 12/08/22 1238     Folate 4.53 ng/mL     Narrative:      Results may be falsely increased if patient taking Biotin.      Vitamin B12 [[id number]]  (Normal) Collected: 12/08/22 0528    Specimen: Blood Updated: 12/08/22 1238     Vitamin B-12 592 pg/mL     Narrative:      Results may be falsely increased if patient taking Biotin.      COVID-19 RAPID AG,VERITOR,COR/EUGENIA/PAD/SG/MAD/HAN/LAG/TANIYA/ IN-HOUSE,DRY SWAB, 1-2 HR TAT - Swab, Nasal Cavity [[id number]]  (Normal) Collected: 12/08/22 0800    Specimen: Swab from Nasal Cavity Updated: 12/08/22 0854     COVID19 Presumptive Negative    Narrative:      Fact sheets for providers: https://www.fda.gov/media/625428/download    Fact sheets for patients: https://www.fda.gov/media/347889/download    Influenza Antigen, Rapid - Swab, Nasopharynx [[id number]]  (Normal) Collected: 12/08/22 0800    Specimen: Swab from Nasopharynx Updated: 12/08/22 0842     Influenza A Ag, EIA Negative     Influenza B Ag, EIA Negative    Narrative:      Recommend confirmation of negative results by viral culture or molecular assay.    Basic Metabolic Panel [[id number]]  (Abnormal) Collected: 12/08/22 0528    Specimen: Blood Updated: 12/08/22 0606     Glucose 98 mg/dL      BUN 9 mg/dL      Creatinine 1.17 mg/dL      Sodium 141 mmol/L      Potassium 4.3 mmol/L      Chloride 107 mmol/L      CO2 27.0 mmol/L      Calcium 8.2 mg/dL      BUN/Creatinine Ratio 7.7     Anion Gap 7.0 mmol/L      eGFR 51.6 mL/min/1.73      Comment: National Kidney Foundation and American Society of Nephrology (ASN) Task Force recommended calculation based on the Chronic Kidney Disease Epidemiology Collaboration  (CKD-EPI) equation refit without adjustment for race.       Narrative:      GFR Normal >60  Chronic Kidney Disease <60  Kidney Failure <15      Iron Profile [[id number]]  (Abnormal) Collected: 12/08/22 0528    Specimen: Blood Updated: 12/08/22 0606     Iron 24 mcg/dL      Iron Saturation 8 %      Transferrin 197 mg/dL      TIBC 294 mcg/dL     Ferritin [[id number]]  (Normal) Collected: 12/08/22 0528    Specimen: Blood Updated: 12/08/22 0606     Ferritin 58.96 ng/mL     Narrative:      Results may be falsely decreased if patient taking Biotin.      CBC & Differential [[id number]]  (Abnormal) Collected: 12/08/22 0528    Specimen: Blood Updated: 12/08/22 0542    Narrative:      The following orders were created for panel order CBC & Differential.  Procedure                               Abnormality         Status                     ---------                               -----------         ------                     CBC Auto Differential[[id number]]        Abnormal            Final result                 Please view results for these tests on the individual orders.    CBC Auto Differential [[id number]]  (Abnormal) Collected: 12/08/22 0528    Specimen: Blood Updated: 12/08/22 0542     WBC 6.42 10*3/mm3      RBC 3.24 10*6/mm3      Hemoglobin 9.4 g/dL      Hematocrit 32.5 %      .3 fL      MCH 29.0 pg      MCHC 28.9 g/dL      RDW 13.2 %      RDW-SD 47.9 fl      MPV 9.4 fL      Platelets 237 10*3/mm3      Neutrophil % 64.6 %      Lymphocyte % 27.6 %      Monocyte % 5.8 %      Eosinophil % 1.1 %      Basophil % 0.6 %      Immature Grans % 0.3 %      Neutrophils, Absolute 4.15 10*3/mm3      Lymphocytes, Absolute 1.77 10*3/mm3      Monocytes, Absolute 0.37 10*3/mm3      Eosinophils, Absolute 0.07 10*3/mm3      Basophils, Absolute 0.04 10*3/mm3      Immature Grans, Absolute 0.02 10*3/mm3      nRBC 0.0 /100 WBC           Objective:     Vitals: /67 (BP Location: Right arm, Patient Position: Lying)   Pulse 97   Temp  "98.8 °F (37.1 °C) (Oral)   Resp 18   Ht 157.5 cm (62.01\")   Wt 83.1 kg (183 lb 3.2 oz)   SpO2 91%   BMI 33.50 kg/m²      Intake/Output Summary (Last 24 hours) at 2022 0641  Last data filed at 2022 0600  Gross per 24 hour   Intake --   Output 400 ml   Net -400 ml    Temp (24hrs), Av.2 °F (36.8 °C), Min:97.9 °F (36.6 °C), Max:98.8 °F (37.1 °C)    Glucose:  No results found for: POCGLU  Physical Examination:   General appearance - alert, well appearing, and in no distress and oriented to person, place, and time  Mouth - mucous membranes moist, pharynx normal without lesions  Neck - supple, no significant adenopathy  Lymphatics - no palpable lymphadenopathy, no hepatosplenomegaly  Chest - clear to auscultation  Heart - normal rate, regular rhythm, normal S1, S2, no murmurs, rubs, clicks or gallops  Abdomen - soft, nontender, nondistended, no masses or organomegaly bowel sounds normal  Neurological - alert, oriented, normal speech, no focal findings or movement disorder noted  Extremities -no clubbing cyanosis or edema  Skin -warm dry and intact      Assessment and Plan:     Primary Problem:  Lumbar stenosis    Hospital Problem list/ Treatment recommendations:    Lumbar stenosis    Hypertension    Hypothyroidism (acquired)    Drug induced constipation    Perioperative care  Continue bowel regimen, counseled to work with physical and occupational therapy, counseled on appropriate use of incentive spirometry.        Anemia, multifactorial  Iron labs show mild iron deficiency anemia which is probably exacerbated by blood loss from surgery.    Continue iron supplementation.  Hematocrit 26.7.  Awaiting repeat labs.      Hypertension  Blood pressure well controlled, no changes     Acute viral syndrome   COVID and flu testing negative.  Probably has some other nonspecific virus, not worth doing respiratory viral panel.  Symptomatic therapy with Flonase and Mucinex ordered.    Slow transit " constipation  Continue aggressive postop bowel regimen, awaiting return of bowel function    Six 67-year-old female history of HTN and hypothyroidism followed by Dr. Hodges for PCP needs.  Doing okay postop day #2 step 2 of 2 lumbar surgery.  Vital stable, postop low hemoglobin, awaiting repeat labs.     Judicious use of postoperative pain medication as patient is hypersomnolent this AM and drowsy per nursing staff throughout the day.  Needs out of bed, aggressive therapy.      Reviewed treatment plans with the patient and nursing staff.  20 minutes spent in face to face interaction and coordination of care.   Electronically signed by CIARA Anderson on 12/11/2022 at 06:41 CST

## 2022-12-12 ENCOUNTER — READMISSION MANAGEMENT (OUTPATIENT)
Dept: CALL CENTER | Facility: HOSPITAL | Age: 67
End: 2022-12-12

## 2022-12-12 NOTE — THERAPY DISCHARGE NOTE
Acute Care - Occupational Therapy Discharge Summary  [facility]     Patient Name: Amanda Bhandari  : 1955  MRN: [id number]    Today's Date: 2022                 Admit Date: 2022        OT Recommendation and Plan    Visit Dx:    ICD-10-CM ICD-9-CM   1. Spinal stenosis of lumbar region without neurogenic claudication  M48.061 724.02   2. Impaired mobility  Z74.09 799.89   3. Deficits in activities of daily living  Z78.9 V49.89                OT Rehab Goals     Row Name 22 0700             Dressing Goal 1 (OT)    Activity/Device (Dressing Goal 1, OT) upper body dressing  -AC      Owen/Cues Needed (Dressing Goal 1, OT) set-up required  -AC      Time Frame (Dressing Goal 1, OT) long term goal (LTG);by discharge  -AC      Progress/Outcome (Dressing Goal 1, OT) goal not met  -AC         Toileting Goal 1 (OT)    Activity/Device (Toileting Goal 1, OT) toileting skills, all;commode, 3-in-1;commode  -AC      Owen Level/Cues Needed (Toileting Goal 1, OT) contact guard required  -AC      Time Frame (Toileting Goal 1, OT) long term goal (LTG);by discharge  -AC      Progress/Outcome (Toileting Goal 1, OT) goal not met  -AC         Problem Specific Goal 1 (OT)    Problem Specific Goal 1 (OT) Pt. will report 3/3 spinal precautions  -AC      Time Frame (Problem Specific Goal 1, OT) long term goal (LTG);by discharge  -AC      Progress/Outcome (Problem Specific Goal 1, OT) goal not met  -AC            User Key  (r) = Recorded By, (t) = Taken By, (c) = Cosigned By    Initials Name Provider Type Discipline    AC Forrest Canchola, OTR/L, CNT Occupational Therapist OT                 Outcome Measures     Row Name 22 1000 22 0958          How much help from another person do you currently need...    Turning from your back to your side while in flat bed without using bedrails? -- 3  -DAMIAN     Moving from lying on back to sitting on the side of a flat bed without bedrails? -- 3  -DAMIAN      Moving to and from a bed to a chair (including a wheelchair)? -- 3  -DAMIAN     Standing up from a chair using your arms (e.g., wheelchair, bedside chair)? -- 3  -DAMIAN     Climbing 3-5 steps with a railing? -- 2  -DAMIAN     To walk in hospital room? -- 3  -DAMIAN     AM-PAC 6 Clicks Score (PT) -- 17  -DAMIAN        How much help from another is currently needed...    Putting on and taking off regular lower body clothing? 2  -LS --     Bathing (including washing, rinsing, and drying) 2  -LS --     Toileting (which includes using toilet bed pan or urinal) 4  -LS --     Putting on and taking off regular upper body clothing 4  -LS --     Taking care of personal grooming (such as brushing teeth) 4  -LS --     Eating meals 4  -LS --     AM-PAC 6 Clicks Score (OT) 20  -LS --        Functional Assessment    Outcome Measure Options AM-PAC 6 Clicks Daily Activity (OT)  -LS AM-PAC 6 Clicks Basic Mobility (PT)  -DAMIAN           User Key  (r) = Recorded By, (t) = Taken By, (c) = Cosigned By    Initials Name Provider Type    Joseph Hicks PTA Physical Therapist Assistant    Emelyn Hoffman COTA Occupational Therapist Assistant                        OT Discharge Summary  Anticipated Discharge Disposition (OT): home with assist  Reason for Discharge: Discharge from facility  Outcomes Achieved: Refer to plan of care for updates on goals achieved  Discharge Destination: Home with assist      Forrest Canchola, YANN/L, LIA  12/12/2022

## 2022-12-12 NOTE — PAYOR COMM NOTE
"REF  [id number]    [facility]  [place],  [phone]  OR  FAX   [phone]     Osmani Bhandari (67 y.o. Female)     Date of Birth   1955    Social Security Number       Address   [address] [address]    Home Phone       MRN   [id number]       Yarsani   Other    Marital Status                               Admission Date   12/7/22    Admission Type   Elective    Admitting Provider   MIGUELANGEL Cabral MD    Attending Provider       Department, Room/Bed   [facility] 3A, 359/1       Discharge Date   12/11/2022    Discharge Disposition   Home or Self Care    Discharge Destination                               Attending Provider: (none)   Allergies: Percocet [Oxycodone-acetaminophen]    Isolation: None   Infection: None   Code Status: Prior    Ht: 157.5 cm (62.01\")   Wt: 83.1 kg (183 lb 3.2 oz)    Admission Cmt: None   Principal Problem: Lumbar stenosis [M48.061]                 Active Insurance as of 12/7/2022     Primary Coverage     Payor Plan Insurance Group Employer/Plan Group    HUMANA MEDICARE REPLACEMENT HUMANA MEDICARE REPLACEMENT [id number]     Payor Plan Address Payor Plan Phone Number Payor Plan Fax Number Effective Dates    [address] [address] [phone]  11/1/2019 - None Entered    [facility] [id number]       Subscriber Name Subscriber Birth Date Member ID       OSMANI BHANDARI 1955 [id number]                 Emergency Contacts      (Rel.) Home Phone Work Phone Mobile Phone    Contact,No [phone] -- --    isaeblle bhandari (Spouse) [phone] -- [phone]               Discharge Summary      Yuri Webb PA-C at 12/11/22 1451            Date of Discharge:  12/11/2022    Admission Diagnosis: M54.16    Discharge Diagnosis:   1. Low back pain.  2. Right buttock, thigh and leg radiculopathy.   3. Multilevel lumbar degenerative disc disease, worse L5-S1.   4. Multilevel lumbar facet arthropathy, worse right L5-S1. "   5. Thoracolumbar degenerative scoliosis, concave left T11 to L4, concave right L5-S1.   6. Right-sided foraminal stenosis L5-S1.   7. Possible right sacroiliitis.  8.  Status post anterior decompression, ALIF with instrumentation L5-S1, 12/7/2022  9.  Status post PSF with instrumentation L5-S1, 12/9/2022      Consults During Admission: Dr. Lazo    Hospital Course  Patient is a 67 y.o. female Known to our practice. Admitted for the above staged fusion.  This has been well tolerated and the patient will be discharged home today in good stable condition with instructions for brace when out of bed.  No driving until directed.  Patient will follow-up with Dr. Cabral's clinic in two weeks. They will call if problems arise.         Condition on Discharge:  STABLE    Vital Signs  Temp:  [97.9 °F (36.6 °C)-98.8 °F (37.1 °C)] 98.2 °F (36.8 °C)  Heart Rate:  [] 86  Resp:  [18] 18  BP: (131-149)/(65-82) 135/75    Physical Exam:   Alert and oriented ×3, no acute distress, grossly neurovascularly intact, vital signs stable, dressing clean dry and intact, moving all extremities without focal deficit      Discharge Disposition      Discharge Medications     Discharge Medications      ASK your doctor about these medications      Instructions Start Date   buPROPion  MG 24 hr tablet  Commonly known as: WELLBUTRIN XL   150 mg, Oral, Daily      levothyroxine 25 MCG tablet  Commonly known as: SYNTHROID, LEVOTHROID   25 mcg, Oral, Daily      mirtazapine 7.5 MG half tablet  Commonly known as: REMERON   7.5 mg, Oral, Nightly      traMADol 50 MG tablet  Commonly known as: ULTRAM   50 mg, Oral, Every 8 Hours PRN      traZODone 50 MG tablet  Commonly known as: DESYREL   50 mg, Oral, Nightly      zolpidem 10 MG tablet  Commonly known as: AMBIEN   10 mg, Oral, Nightly             Discharge Diet: Resume Home diet, advance as tolerated    Activity at Discharge: Resume home activity, advance as tolerated, no lifting, no twisting,  no bending, brace as directed, no driving until directed.     Follow-up Appointments  Followup with PCP within one week  Followup Marianna Mendoza at 2 weeks post-op         Yuri Webb PA-C  12/11/22  14:51 CST                Electronically signed by Yuri Webb PA-C at 12/11/22 1457       Discharge Order (From admission, onward)     Start     Ordered    12/11/22 1453  Discharge patient  Once        Expected Discharge Date: 12/11/22    Discharge Disposition: Home or Self Care    Physician of Record for Attribution - Please select from Treatment Team: MIGUELANGEL MALIN [[id number]]    Review needed by CMO to determine Physician of Record: No       Question Answer Comment   Physician of Record for Attribution - Please select from Treatment Team MIGUELANGEL MALIN    Review needed by CMO to determine Physician of Record No        12/11/22 6757

## 2022-12-12 NOTE — THERAPY DISCHARGE NOTE
Acute Care - Physical Therapy Discharge Summary  [facility]       Patient Name: Amanda Bhandari  : 1955  MRN: [id number]    Today's Date: 2022                 Admit Date: 2022      PT Recommendation and Plan    Visit Dx:    ICD-10-CM ICD-9-CM   1. Spinal stenosis of lumbar region without neurogenic claudication  M48.061 724.02   2. Impaired mobility  Z74.09 799.89   3. Deficits in activities of daily living  Z78.9 V49.89        Outcome Measures     Row Name 22 1000 22 0958          How much help from another person do you currently need...    Turning from your back to your side while in flat bed without using bedrails? -- 3  -DAMIAN     Moving from lying on back to sitting on the side of a flat bed without bedrails? -- 3  -DAMIAN     Moving to and from a bed to a chair (including a wheelchair)? -- 3  -DAMIAN     Standing up from a chair using your arms (e.g., wheelchair, bedside chair)? -- 3  -DAMIAN     Climbing 3-5 steps with a railing? -- 2  -DAMIAN     To walk in hospital room? -- 3  -DAMIAN     AM-PAC 6 Clicks Score (PT) -- 17  -DAMIAN        How much help from another is currently needed...    Putting on and taking off regular lower body clothing? 2  -LS --     Bathing (including washing, rinsing, and drying) 2  -LS --     Toileting (which includes using toilet bed pan or urinal) 4  -LS --     Putting on and taking off regular upper body clothing 4  -LS --     Taking care of personal grooming (such as brushing teeth) 4  -LS --     Eating meals 4  -LS --     AM-PAC 6 Clicks Score (OT) 20  -LS --        Functional Assessment    Outcome Measure Options AM-PAC 6 Clicks Daily Activity (OT)  -LS AM-PAC 6 Clicks Basic Mobility (PT)  -DAMIAN           User Key  (r) = Recorded By, (t) = Taken By, (c) = Cosigned By    Initials Name Provider Type    Joseph Hicks PTA Physical Therapist Assistant    Emelyn Hoffman COTA Occupational Therapist Assistant                     PT Rehab Goals     Row Name 22 0900              Bed Mobility Goal 1 (PT)    Activity/Assistive Device (Bed Mobility Goal 1, PT) rolling to left;rolling to right;bridging;sidelying to sit;sit to sidelying  -AB      Arboles Level/Cues Needed (Bed Mobility Goal 1, PT) standby assist  -AB      Time Frame (Bed Mobility Goal 1, PT) long term goal (LTG)  -AB      Progress/Outcomes (Bed Mobility Goal 1, PT) goal met  -AB         Transfer Goal 1 (PT)    Activity/Assistive Device (Transfer Goal 1, PT) sit-to-stand/stand-to-sit;bed-to-chair/chair-to-bed  -AB      Arboles Level/Cues Needed (Transfer Goal 1, PT) standby assist  -AB      Time Frame (Transfer Goal 1, PT) long term goal (LTG)  -AB      Progress/Outcome (Transfer Goal 1, PT) goal met  -AB         Gait Training Goal 1 (PT)    Activity/Assistive Device (Gait Training Goal 1, PT) gait (walking locomotion);increase endurance/gait distance;increase energy conservation;decrease fall risk;diminish gait deviation;walker, rolling  -AB      Arboles Level (Gait Training Goal 1, PT) contact guard required  -AB      Distance (Gait Training Goal 1, PT) 75  -AB      Time Frame (Gait Training Goal 1, PT) long term goal (LTG)  -AB      Progress/Outcome (Gait Training Goal 1, PT) goal met  -AB         Patient Education Goal (PT)    Activity (Patient Education Goal, PT) Pt will lionel/doff LSO independently  -AB      Arboles/Cues/Accuracy (Memory Goal 2, PT) independent  -AB      Time Frame (Patient Education Goal, PT) long term goal (LTG)  -AB      Progress/Outcome (Patient Education Goal, PT) goal partially met  -AB            User Key  (r) = Recorded By, (t) = Taken By, (c) = Cosigned By    Initials Name Provider Type Discipline    Ophelia Galdamez, PTA Physical Therapist Assistant PT                    PT Discharge Summary  Anticipated Discharge Disposition (PT): skilled nursing facility  Reason for Discharge: Discharge from facility  Outcomes Achieved: Refer to plan of care for updates on goals  achieved  Discharge Destination: [facility]      Ophelia Rosario, PTA   12/12/2022

## 2022-12-12 NOTE — OUTREACH NOTE
Prep Survey    Flowsheet Row Responses   Religious facility patient discharged from? [place]   Is LACE score < 7 ? No   Emergency Room discharge w/ pulse ox? No   Eligibility Readm Mgmt   Discharge diagnosis Anterior discectomy decompression with bilateral neural foraminotomy L5-S1   Does the patient have one of the following disease processes/diagnoses(primary or secondary)? General Surgery   Does the patient have Home health ordered? No   Is there a DME ordered? No   Prep survey completed? Yes          ROGER JAIMES - Registered Nurse

## 2022-12-14 ENCOUNTER — READMISSION MANAGEMENT (OUTPATIENT)
Dept: CALL CENTER | Facility: HOSPITAL | Age: 67
End: 2022-12-14

## 2022-12-14 NOTE — OUTREACH NOTE
General Surgery Week 1 Survey    Flowsheet Row Responses   [facility] patient discharged from? [place]   Does the patient have one of the following disease processes/diagnoses(primary or secondary)? General Surgery   Week 1 attempt successful? Yes   Call start time 0934   Call end time 0939   Discharge diagnosis Anterior discectomy decompression with bilateral neural foraminotomy L5-S1   Person spoke with today (if not patient) and relationship Patient   Meds reviewed with patient/caregiver? Yes   Prescription comments No concerns or questions- Was able to  medications from [facility]   Is the patient taking all medications as directed (includes completed medication regime)? Yes   Does the patient have a follow up appointment scheduled with their surgeon? No   What is preventing the patient from scheduling follow up appointments? Haven't had time   Nursing Interventions Educated patient on importance of making appointment   Has the patient kept scheduled appointments due by today? N/A   Comments Patient to call today to schedule fu with pcp and with Dr. Cabral Surgeon   Has home health visited the patient within 72 hours of discharge? N/A   Psychosocial issues? No   Did the patient receive a copy of their discharge instructions? Yes   Nursing interventions Reviewed instructions with patient   What is the patient's perception of their health status since discharge? Improving   Nursing interventions Nurse provided patient education   Is the patient /caregiver able to teach back basic post-op care? Drive as instructed by MD in discharge instructions, Lifting as instructed by MD in discharge instructions  [Resume home activity, advance as tolerated, no lifting, no twisting, no bending, brace as directed, no driving until directed]   Is the patient/caregiver able to teach back signs and symptoms of incisional infection? Increased redness, swelling or pain at the incisonal site, Increased drainage or bleeding,  Incisional warmth, Pus or odor from incision, Fever  [no current s/s of infection noted.]   Is the patient/caregiver able to teach back steps to recovery at home? Set small, achievable goals for return to baseline health   Is the patient/caregiver able to teach back the hierarchy of who to call/visit for symptoms/problems? PCP, Specialist, Home health nurse, Urgent Care, ED, 911 Yes   Week 1 call completed? Yes   Wrap up additional comments Patient doing very well- states brace has been a life saver. She is aware of fu appt that need to be made. She has no questions or concerns.          BRADY CORTEZ - Registered Nurse

## 2023-04-10 NOTE — ANESTHESIA PROCEDURE NOTES
Airway  Urgency: elective    Date/Time: 12/9/2022 8:59 AM  Airway not difficult    General Information and Staff    Patient location during procedure: OR  CRNA/CAA: Tc Kowalski CRNA    Indications and Patient Condition  Indications for airway management: airway protection    Preoxygenated: yes  MILS maintained throughout  Mask difficulty assessment: 1 - vent by mask    Final Airway Details  Final airway type: endotracheal airway      Successful airway: ETT  Cuffed: yes   Successful intubation technique: direct laryngoscopy and video laryngoscopy  Endotracheal tube insertion site: oral  Blade: Ford  Blade size: 3  ETT size (mm): 7.5  Cormack-Lehane Classification: grade I - full view of glottis  Placement verified by: chest auscultation and capnometry   Cuff volume (mL): 5  Measured from: lips  ETT/EBT  to lips (cm): 22  Number of attempts at approach: 1  Assessment: lips, teeth, and gum same as pre-op and atraumatic intubation           Excisional Biopsy Additional Text (Leave Blank If You Do Not Want): The margin was drawn around the clinically apparent lesion. An elliptical shape was then drawn on the skin incorporating the lesion and margins.  Incisions were then made along these lines to the appropriate tissue plane and the lesion was extirpated.

## 2024-07-11 ENCOUNTER — APPOINTMENT (OUTPATIENT)
Dept: CT IMAGING | Facility: HOSPITAL | Age: 69
End: 2024-07-11
Payer: MEDICARE

## 2024-07-11 ENCOUNTER — HOSPITAL ENCOUNTER (EMERGENCY)
Facility: HOSPITAL | Age: 69
Discharge: HOME | End: 2024-07-11
Payer: MEDICARE

## 2024-07-11 ENCOUNTER — APPOINTMENT (OUTPATIENT)
Dept: GENERAL RADIOLOGY | Facility: HOSPITAL | Age: 69
End: 2024-07-11
Payer: MEDICARE

## 2024-07-11 VITALS
WEIGHT: 190 LBS | BODY MASS INDEX: 34.96 KG/M2 | RESPIRATION RATE: 20 BRPM | OXYGEN SATURATION: 98 % | SYSTOLIC BLOOD PRESSURE: 116 MMHG | DIASTOLIC BLOOD PRESSURE: 68 MMHG | HEIGHT: 62 IN | TEMPERATURE: 98 F | HEART RATE: 70 BPM

## 2024-07-11 DIAGNOSIS — S32.001A CLOSED BURST FRACTURE OF LUMBAR VERTEBRA, INITIAL ENCOUNTER: Primary | ICD-10-CM

## 2024-07-11 LAB
ALBUMIN SERPL-MCNC: 4 G/DL (ref 3.5–5.2)
ALBUMIN/GLOB SERPL: 1.4 G/DL
ALP SERPL-CCNC: 101 U/L (ref 39–117)
ALT SERPL W P-5'-P-CCNC: 19 U/L (ref 1–33)
ANION GAP SERPL CALCULATED.3IONS-SCNC: 8 MMOL/L (ref 5–15)
APTT PPP: 26.6 SECONDS (ref 24.5–36)
AST SERPL-CCNC: 23 U/L (ref 1–32)
BASOPHILS # BLD AUTO: 0.03 10*3/MM3 (ref 0–0.2)
BASOPHILS NFR BLD AUTO: 0.4 % (ref 0–1.5)
BILIRUB SERPL-MCNC: 0.3 MG/DL (ref 0–1.2)
BUN SERPL-MCNC: 14 MG/DL (ref 8–23)
BUN/CREAT SERPL: 11.7 (ref 7–25)
CALCIUM SPEC-SCNC: 9.2 MG/DL (ref 8.6–10.5)
CHLORIDE SERPL-SCNC: 106 MMOL/L (ref 98–107)
CO2 SERPL-SCNC: 25 MMOL/L (ref 22–29)
CREAT SERPL-MCNC: 1.2 MG/DL (ref 0.57–1)
DEPRECATED RDW RBC AUTO: 44.4 FL (ref 37–54)
EGFRCR SERPLBLD CKD-EPI 2021: 49.7 ML/MIN/1.73
EOSINOPHIL # BLD AUTO: 0.06 10*3/MM3 (ref 0–0.4)
EOSINOPHIL NFR BLD AUTO: 0.7 % (ref 0.3–6.2)
ERYTHROCYTE [DISTWIDTH] IN BLOOD BY AUTOMATED COUNT: 13 % (ref 12.3–15.4)
GLOBULIN UR ELPH-MCNC: 2.8 GM/DL
GLUCOSE SERPL-MCNC: 102 MG/DL (ref 65–99)
HCT VFR BLD AUTO: 35.2 % (ref 34–46.6)
HGB BLD-MCNC: 11.4 G/DL (ref 12–15.9)
IMM GRANULOCYTES # BLD AUTO: 0.04 10*3/MM3 (ref 0–0.05)
IMM GRANULOCYTES NFR BLD AUTO: 0.5 % (ref 0–0.5)
INR PPP: 1 (ref 0.91–1.09)
LYMPHOCYTES # BLD AUTO: 1.77 10*3/MM3 (ref 0.7–3.1)
LYMPHOCYTES NFR BLD AUTO: 21.7 % (ref 19.6–45.3)
MCH RBC QN AUTO: 30.1 PG (ref 26.6–33)
MCHC RBC AUTO-ENTMCNC: 32.4 G/DL (ref 31.5–35.7)
MCV RBC AUTO: 92.9 FL (ref 79–97)
MONOCYTES # BLD AUTO: 0.3 10*3/MM3 (ref 0.1–0.9)
MONOCYTES NFR BLD AUTO: 3.7 % (ref 5–12)
NEUTROPHILS NFR BLD AUTO: 5.95 10*3/MM3 (ref 1.7–7)
NEUTROPHILS NFR BLD AUTO: 73 % (ref 42.7–76)
NRBC BLD AUTO-RTO: 0 /100 WBC (ref 0–0.2)
PLATELET # BLD AUTO: 249 10*3/MM3 (ref 140–450)
PMV BLD AUTO: 9.4 FL (ref 6–12)
POTASSIUM SERPL-SCNC: 4.2 MMOL/L (ref 3.5–5.2)
PROT SERPL-MCNC: 6.8 G/DL (ref 6–8.5)
PROTHROMBIN TIME: 13.6 SECONDS (ref 11.8–14.8)
RBC # BLD AUTO: 3.79 10*6/MM3 (ref 3.77–5.28)
SODIUM SERPL-SCNC: 139 MMOL/L (ref 136–145)
WBC NRBC COR # BLD AUTO: 8.15 10*3/MM3 (ref 3.4–10.8)

## 2024-07-11 PROCEDURE — 85610 PROTHROMBIN TIME: CPT | Performed by: NURSE PRACTITIONER

## 2024-07-11 PROCEDURE — 93005 ELECTROCARDIOGRAM TRACING: CPT | Performed by: NURSE PRACTITIONER

## 2024-07-11 PROCEDURE — 25010000002 ONDANSETRON PER 1 MG: Performed by: NURSE PRACTITIONER

## 2024-07-11 PROCEDURE — 73523 X-RAY EXAM HIPS BI 5/> VIEWS: CPT

## 2024-07-11 PROCEDURE — 80053 COMPREHEN METABOLIC PANEL: CPT | Performed by: NURSE PRACTITIONER

## 2024-07-11 PROCEDURE — 99284 EMERGENCY DEPT VISIT MOD MDM: CPT

## 2024-07-11 PROCEDURE — 25010000002 FENTANYL CITRATE (PF) 50 MCG/ML SOLUTION: Performed by: NURSE PRACTITIONER

## 2024-07-11 PROCEDURE — 85025 COMPLETE CBC W/AUTO DIFF WBC: CPT | Performed by: NURSE PRACTITIONER

## 2024-07-11 PROCEDURE — 85730 THROMBOPLASTIN TIME PARTIAL: CPT | Performed by: NURSE PRACTITIONER

## 2024-07-11 PROCEDURE — 96374 THER/PROPH/DIAG INJ IV PUSH: CPT

## 2024-07-11 PROCEDURE — 96375 TX/PRO/DX INJ NEW DRUG ADDON: CPT

## 2024-07-11 PROCEDURE — 96376 TX/PRO/DX INJ SAME DRUG ADON: CPT

## 2024-07-11 PROCEDURE — 25010000002 HYDROMORPHONE PER 4 MG: Performed by: NURSE PRACTITIONER

## 2024-07-11 PROCEDURE — 72131 CT LUMBAR SPINE W/O DYE: CPT

## 2024-07-11 PROCEDURE — 72128 CT CHEST SPINE W/O DYE: CPT

## 2024-07-11 PROCEDURE — 25010000002 DEXAMETHASONE PER 1 MG: Performed by: NURSE PRACTITIONER

## 2024-07-11 PROCEDURE — 36415 COLL VENOUS BLD VENIPUNCTURE: CPT

## 2024-07-11 RX ORDER — HYDROCODONE BITARTRATE AND ACETAMINOPHEN 7.5; 325 MG/1; MG/1
1 TABLET ORAL EVERY 4 HOURS PRN
Qty: 15 TABLET | Refills: 0 | Status: SHIPPED | OUTPATIENT
Start: 2024-07-11

## 2024-07-11 RX ORDER — HYDROMORPHONE HYDROCHLORIDE 1 MG/ML
0.5 INJECTION, SOLUTION INTRAMUSCULAR; INTRAVENOUS; SUBCUTANEOUS ONCE
Status: COMPLETED | OUTPATIENT
Start: 2024-07-11 | End: 2024-07-11

## 2024-07-11 RX ORDER — FENTANYL CITRATE 50 UG/ML
50 INJECTION, SOLUTION INTRAMUSCULAR; INTRAVENOUS ONCE
Status: COMPLETED | OUTPATIENT
Start: 2024-07-11 | End: 2024-07-11

## 2024-07-11 RX ORDER — DEXAMETHASONE SODIUM PHOSPHATE 10 MG/ML
8 INJECTION INTRAMUSCULAR; INTRAVENOUS ONCE
Status: COMPLETED | OUTPATIENT
Start: 2024-07-11 | End: 2024-07-11

## 2024-07-11 RX ORDER — ONDANSETRON 2 MG/ML
4 INJECTION INTRAMUSCULAR; INTRAVENOUS ONCE
Status: COMPLETED | OUTPATIENT
Start: 2024-07-11 | End: 2024-07-11

## 2024-07-11 RX ADMIN — ONDANSETRON 4 MG: 2 INJECTION INTRAMUSCULAR; INTRAVENOUS at 12:40

## 2024-07-11 RX ADMIN — FENTANYL CITRATE 50 MCG: 50 INJECTION, SOLUTION INTRAMUSCULAR; INTRAVENOUS at 15:24

## 2024-07-11 RX ADMIN — DEXAMETHASONE SODIUM PHOSPHATE 8 MG: 10 INJECTION INTRAMUSCULAR; INTRAVENOUS at 15:08

## 2024-07-11 RX ADMIN — ONDANSETRON 4 MG: 2 INJECTION INTRAMUSCULAR; INTRAVENOUS at 15:22

## 2024-07-11 RX ADMIN — HYDROMORPHONE HYDROCHLORIDE 0.5 MG: 1 INJECTION, SOLUTION INTRAMUSCULAR; INTRAVENOUS; SUBCUTANEOUS at 12:40

## 2024-07-11 NOTE — ED PROVIDER NOTES
Subjective   History of Present Illness  Patient is a 68-year-old female who presents to the ER via EMS due to back pain.  Patient states that she was on a trailer and states the pin was out of the trailer.  She states they were attempting to load a refrigerator from a bahman onto the trailer.  The trailer apparently flipped up suddenly which caused the refrigerator to go back and subsequently resulted in patient falling.  She states that she landed on her right hip.  She complains of low back pain.  She has had previous surgery per Dr. Cabral.  She denies hitting her head or loss of consciousness.  She complains of low back pain.  Denies any loss of bowel or bladder control or saddle anesthesia, no radicular symptoms.  Past medical history significant for arthritis, thyroid disease, hypertension, hypothyroidism, PE        Review of Systems   Constitutional:  Negative for fever.   HENT: Negative.  Negative for congestion.    Respiratory: Negative.  Negative for cough and shortness of breath.    Cardiovascular:  Negative for chest pain.   Gastrointestinal: Negative.  Negative for abdominal pain, diarrhea, nausea and vomiting.   Genitourinary: Negative.  Negative for dysuria.   Musculoskeletal:  Positive for back pain. Negative for neck pain.   Neurological: Negative.  Negative for weakness and numbness.   All other systems reviewed and are negative.      Past Medical History:   Diagnosis Date    Arthritis     Disease of thyroid gland     History of transfusion 1977 tubal pregnancy ruptured    Hypertension     Hypothyroidism     Pulmonary embolism        Allergies   Allergen Reactions    Percocet [Oxycodone-Acetaminophen] Itching     PT STATES DR. SENIOR GAVE HER AN RX FOR IT AND TOLD HER TO TAKE BENADRYL WITH IT.  PT STATES NO PROBLEMS WITH ITCHING WHEN SHE DOES THIS       Past Surgical History:   Procedure Laterality Date    ABLATION OF DYSRHYTHMIC FOCUS  2014    Pre Daugherty's    ANTERIOR LUMBAR EXPOSURE N/A  12/7/2022    Procedure: ANTERIOR LUMBAR EXPOSURE;  Surgeon: MIGUELANGEL Cabral MD;  Location:  [facility];  Service: Orthopedic Spine;  Laterality: N/A;    APPENDECTOMY      DIAGNOSTIC LAPAROSCOPY EXPLORATORY LAPAROTOMY N/A     FROM HORSE INJURY    FRACTURE SURGERY Left     ANKLE, PLATE PLACED    HYSTERECTOMY      JOINT REPLACEMENT Bilateral     LUMBAR FUSION N/A 12/7/2022    Procedure: ANTERIOR DECOMPRESSION, ANTERIOR LUMBAR INTERBODY FUSION WITH INSTRUMENTATION L5-S1, WITH EXPOSURE AND CLOSURE;  Surgeon: MIGUELANGEL Cabral MD;  Location:  [facility];  Service: Orthopedic Spine;  Laterality: N/A;    LUMBAR LAMINECTOMY WITH FUSION N/A 12/9/2022    Procedure: POSTERIOR SPINAL FUSION WITH INSTRUMENTATION L5-S1;  Surgeon: MIGUELANGEL Cabral MD;  Location:  [facility];  Service: Orthopedic Spine;  Laterality: N/A;    SHOULDER SURGERY Left     WRIST SURGERY         Family History   Problem Relation Age of Onset    Cancer Mother         Ovarian    Heart attack Mother     Cancer Father         Esophageal cancer, prostate cancer       Social History     Socioeconomic History    Marital status:    Tobacco Use    Smoking status: Former     Current packs/day: 0.00     Average packs/day: 1.5 packs/day for 15.0 years (22.5 ttl pk-yrs)     Types: Cigarettes     Start date: 1/1/1999     Quit date: 1/1/2014     Years since quitting: 10.5    Smokeless tobacco: Never   Vaping Use    Vaping status: Never Used   Substance and Sexual Activity    Alcohol use: Not Currently     Comment: VERY SELDOM    Drug use: Never    Sexual activity: Not Currently     Partners: Male     Birth control/protection: Hysterectomy           Objective   Physical Exam  Vitals and nursing note reviewed.   Constitutional:       General: She is in acute distress.      Appearance: She is well-developed.   HENT:      Head: Normocephalic and atraumatic.      Right Ear: External ear normal.      Left Ear: External ear normal.      Nose: Nose normal.       Mouth/Throat:      Pharynx: Oropharynx is clear.   Eyes:      Extraocular Movements: Extraocular movements intact.      Conjunctiva/sclera: Conjunctivae normal.   Cardiovascular:      Rate and Rhythm: Normal rate and regular rhythm.      Pulses: Normal pulses.      Heart sounds: Normal heart sounds.   Pulmonary:      Effort: Pulmonary effort is normal.      Breath sounds: Normal breath sounds.   Abdominal:      General: Bowel sounds are normal.      Palpations: Abdomen is soft.   Musculoskeletal:         General: Normal range of motion.      Cervical back: Normal range of motion and neck supple.   Skin:     General: Skin is warm and dry.      Capillary Refill: Capillary refill takes less than 2 seconds.   Neurological:      General: No focal deficit present.      Mental Status: She is alert and oriented to person, place, and time.   Psychiatric:         Mood and Affect: Mood normal.         Behavior: Behavior normal.         Thought Content: Thought content normal.         Judgment: Judgment normal.         Procedures           ED Course  ED Course as of 07/12/24 0838   Thu Jul 11, 2024   0354 Dr. Dhaliwal spoke with Dr. Cabral's PA who reviewed the images and discussed with his attending who wanted patient sent to his office immediately after discharge for them to evaluate. Patient is neurologically intact and TLSO brace was put on the patient in the ER.  [TW]      ED Course User Index  [TW] Makenzie Angle APRN                                             Medical Decision Making  Patient is a 68-year-old female who presents to the ER via EMS due to back pain.  Patient states that she was on a trailer and states the pin was out of the trailer.  She states they were attempting to load a refrigerator from a bahman onto the trailer.  The trailer apparently flipped up suddenly which caused the refrigerator to go back and subsequently resulted in patient falling.  She states that she landed on her right hip.  She  complains of low back pain.  She has had previous surgery per Dr. Cabral.  She denies hitting her head or loss of consciousness.  She complains of low back pain.  Denies any loss of bowel or bladder control or saddle anesthesia, no radicular symptoms.  Past medical history significant for arthritis, thyroid disease, hypertension, hypothyroidism, PE  Differential diagnosis: Spinous fracture, bony fracture, contusion, sprain, and other    Labs Reviewed  COMPREHENSIVE METABOLIC PANEL - Abnormal; Notable for the following components:     Glucose                       102 (*)                Creatinine                    1.20 (*)               eGFR                          49.7 (*)            All other components within normal limits         Narrative: GFR Normal >60                  Chronic Kidney Disease <60                  Kidney Failure <15                    CBC WITH AUTO DIFFERENTIAL - Abnormal; Notable for the following components:     Hemoglobin                    11.4 (*)               Monocyte %                    3.7 (*)             All other components within normal limits  APTT  PROTIME-INR  CBC AND DIFFERENTIAL     CT Lumbar Spine Without Contrast   Final Result    1. Acute L1 burst fracture with 25% loss of height in the anterior    column. There is no significant loss of height in the middle column,    resultant spinal stenosis or evidence of traumatic subluxation.    Posterior elements are intact.    2. There is an old T11 compression fracture with anterior cortical bone    appearing buckled. This is pre-existing and stable.                   This report was signed and finalized on 7/11/2024 1:52 PM by Dr Kvng Hargrove.          CT Thoracic Spine Without Contrast   Final Result         No acute fracture or traumatic malalignment in the thoracic spine.         See separately dictated lumbar spine.              This report was signed and finalized on 7/11/2024 1:34 PM by Aneudy Ambrosio.          XR  Hips Bilateral With or Without Pelvis 5 View   Final Result         No acute osseous finding.              This report was signed and finalized on 7/11/2024 1:02 PM by Aneudy Ambrosio.         Dr. Dhaliwal spoke with Dr. Cabral's PA who reviewed the images and discussed with his attending (Dr Cabral) who wanted patient sent to his office immediately after discharge for them to evaluate. Patient is neurologically intact and TLSO brace was put on the patient in the ER. She denies any loss of bowel or bladder control or saddle anesthesia. Patient and Dr. Dhaliwal both agreeable with this plan. She will be discharged to go to Dr. Cabral's office.    Problems Addressed:  Closed burst fracture of lumbar vertebra, initial encounter: acute illness or injury    Amount and/or Complexity of Data Reviewed  Labs: ordered. Decision-making details documented in ED Course.  Radiology: ordered. Decision-making details documented in ED Course.  ECG/medicine tests: ordered.    Risk  Prescription drug management.        Final diagnoses:   Closed burst fracture of lumbar vertebra, initial encounter       ED Disposition  ED Disposition       ED Disposition   Discharge    Condition   Stable    Comment   --               No follow-up provider specified.       Medication List        New Prescriptions      HYDROcodone-acetaminophen 7.5-325 MG per tablet  Commonly known as: NORCO  Take 1 tablet by mouth Every 4 (Four) Hours As Needed for Moderate Pain.               Where to Get Your Medications        [facility] [place], [place] - [address] - [phone]  - [phone]   [address] [facility] [address]      Phone: [phone]   HYDROcodone-acetaminophen 7.5-325 MG per tablet            Makenzie Angel, APRN  07/12/24 0838

## 2024-07-15 LAB
QT INTERVAL: 438 MS
QTC INTERVAL: 462 MS

## 2025-04-24 ENCOUNTER — TRANSCRIBE ORDERS (OUTPATIENT)
Dept: ADMINISTRATIVE | Facility: HOSPITAL | Age: 70
End: 2025-04-24
Payer: MEDICARE

## 2025-04-24 DIAGNOSIS — N95.8 OTHER SPECIFIED MENOPAUSAL AND PERIMENOPAUSAL DISORDERS: Primary | ICD-10-CM

## 2025-05-09 ENCOUNTER — HOSPITAL ENCOUNTER (OUTPATIENT)
Dept: BONE DENSITY | Facility: HOSPITAL | Age: 70
Discharge: HOME | End: 2025-05-09
Payer: MEDICARE

## 2025-05-09 DIAGNOSIS — N95.8 OTHER SPECIFIED MENOPAUSAL AND PERIMENOPAUSAL DISORDERS: ICD-10-CM

## 2025-05-09 PROCEDURE — 77080 DXA BONE DENSITY AXIAL: CPT

## (undated) DEVICE — CATH IV ANGIO FEP 12G 3IN LTBLU 10PK

## (undated) DEVICE — BANDAGE COMPR W3INXL15FT BGE E SGL LAYERED CLP CLSR

## (undated) DEVICE — Device: Brand: POWER-FLO®

## (undated) DEVICE — SAFEOP STIMULATING BALL-TIP PROBE, STERILE: Brand: SAFEOP

## (undated) DEVICE — TUBE ET 7MM NSL ORAL BASIC CUF INTMED MURPHY EYE RADPQ MRK

## (undated) DEVICE — CLTH CLENS READYCLEANSE PERI CARE PK/5

## (undated) DEVICE — STERILE POLYISOPRENE POWDER-FREE SURGICAL GLOVES: Brand: PROTEXIS

## (undated) DEVICE — GLV SURG SENSICARE W/ALOE PF LF 7.5 STRL

## (undated) DEVICE — SUT PROLN 5/0 C1 DA 24IN 8725H

## (undated) DEVICE — GLOVE SURG SZ 75 CRM LTX FREE POLYISOPRENE POLYMER BEAD ANTI

## (undated) DEVICE — GOWN,PRECEPT,XLNG/XXLARGE,STRL: Brand: MEDLINE

## (undated) DEVICE — SUT SILK 2/0 SUTUPAK TIES 24IN SA75H

## (undated) DEVICE — SUTURE VCRL SZ 2-0 L27IN ABSRB UD L26MM SH 1/2 CIR J417H

## (undated) DEVICE — SURGICAL PROCEDURE PACK KNEE TOT DBD CDS LOURDES HOSP LF

## (undated) DEVICE — GOWN,NON-REINFORCED,SIRUS,SET IN SLV,XL: Brand: MEDLINE

## (undated) DEVICE — SAFEOP EMG & SSEP NEEDLE ELECTRODE KIT: Brand: SAFEOP

## (undated) DEVICE — BAPTIST TURNOVER KIT: Brand: MEDLINE INDUSTRIES, INC.

## (undated) DEVICE — DUAL CUT SAGITTAL BLADE

## (undated) DEVICE — LP VESL MAXI 2.5X1MM RED 2PK

## (undated) DEVICE — SUT MNCRYL 4/0 PS2 27IN UD MCP426H

## (undated) DEVICE — PK SPINE POST 30

## (undated) DEVICE — LARYNGOSCOPE BLDE MAC HNDL M SZ 35 ST CURAPLEX CURAVIEW LED

## (undated) DEVICE — 3M™ STERI-STRIP™ REINFORCED ADHESIVE SKIN CLOSURES, R1547, 1/2 IN X 4 IN (12 MM X 100 MM), 6 STRIPS/ENVELOPE: Brand: 3M™ STERI-STRIP™

## (undated) DEVICE — APPL CHLORAPREP HI/LITE 26ML ORNG

## (undated) DEVICE — NON-WOVEN ADHESIVE WOUND DRESSING: Brand: PRIMAPORE ADHESIVE DRESSING 30*10CM

## (undated) DEVICE — 3.5 MM FULL RADIUS ELITE STRAIGHT                                    DISPOSABLE BLADES, BEIGE,PACKAGED 6                                    PER BOX, STERILE

## (undated) DEVICE — DRAPE,UTILITY,TAPE,15X26,STERILE: Brand: MEDLINE

## (undated) DEVICE — DRSNG SURESITE WNDW 4X4.5

## (undated) DEVICE — FAN SPRAY KIT: Brand: PULSAVAC®

## (undated) DEVICE — SCANLAN® SURG-I-PAW® INSTRUMENT COVERS - RED, 1/10" X 5"/ 3 MMX13 CM (2 - 5" PCS /PKG): Brand: SCANLAN® SURG-I-PAW® INSTRUMENT COVERS

## (undated) DEVICE — TUBING, SUCTION, 1/4" X 12', STRAIGHT: Brand: MEDLINE

## (undated) DEVICE — CHLORAPREP 26ML ORANGE

## (undated) DEVICE — Z INACTIVE USE 2660664 SOLUTION IRRIG 3000ML 0.9% SOD CHL USP UROMATIC PLAS CONT

## (undated) DEVICE — ELECTRD BLD EZ CLN MOD XLNG 2.75IN

## (undated) DEVICE — UNDERGLOVE SURG SZ 8 FNGR THK0.21MIL GRN LTX BEAD CUF

## (undated) DEVICE — GLV SURG DERMASSURE GRN LF PF 8.0

## (undated) DEVICE — C-ARM: Brand: UNBRANDED

## (undated) DEVICE — SPK10277 JACKSON/PRO-AXIS KIT: Brand: SPK10277 JACKSON/PRO-AXIS KIT

## (undated) DEVICE — BLADE SAW W12.5XL70MM THK1MM RECIP DBL SIDE OFFSET

## (undated) DEVICE — ZIMMER® STERILE DISPOSABLE TOURNIQUET CUFF WITH PLC, DUAL PORT, SINGLE BLADDER, 34 IN. (86 CM)

## (undated) DEVICE — SOLUTION IRRIG 3000ML 0.9% SOD CHL USP UROMATIC PLAS CONT

## (undated) DEVICE — SYRINGE MED 10ML TRNSLUC BRL PLUNG BLK MRK POLYPR CTRL

## (undated) DEVICE — 3M™ STERI-DRAPE™ INSTRUMENT POUCH 1018: Brand: STERI-DRAPE™

## (undated) DEVICE — GLOVE SURG SZ 85 CRM LTX FREE POLYISOPRENE POLYMER BEAD ANTI

## (undated) DEVICE — TRAP FLD MINIVAC MEGADYNE 100ML

## (undated) DEVICE — NDL TP BVL JAMSHIDI ACC GUIDE ARCUS

## (undated) DEVICE — GLV SURG BIOGEL LTX PF 6 1/2

## (undated) DEVICE — TUBING PMP IRRIG GOFLO

## (undated) DEVICE — GLV SURG GRN DERMASSURE LF PF 7.5

## (undated) DEVICE — SUT PDS 0 CTX 36IN VIO PDP370T

## (undated) DEVICE — DRP C/ARMOR

## (undated) DEVICE — Z DISCONTINUED USE 2272117 DRAPE SURG 3 QTR N INVASIVE 2 LAYR DISP

## (undated) DEVICE — SPONGE,LAP,12"X12",XR,ST,5/PK,40PK/CS: Brand: MEDLINE

## (undated) DEVICE — BIPOLAR SEALER 23-113-1 AQM 2.3 OM NEURO: Brand: AQUAMANTYS ®

## (undated) DEVICE — SUTURE VCRL SZ 1 L18IN ABSRB UD L36MM CT-1 1/2 CIR J841D

## (undated) DEVICE — SOLUTION IV IRRIG POUR BRL 0.9% SODIUM CHL 2F7124

## (undated) DEVICE — SUPER MULTIVAC 50 WITH INTEGRATED                                    FINGER SWITCHES IFS: Brand: COBLATION

## (undated) DEVICE — ELECTRD BLD EZ CLN STD 6.5IN

## (undated) DEVICE — SUT SILK 2/0 SH 75CM 30IN BLK C016D

## (undated) DEVICE — BIPOLAR SEALER 23-113-1 AQM 2.3: Brand: AQUAMANTYS™

## (undated) DEVICE — SYSTEM SKIN CLSR 22CM DERMBND PRINEO

## (undated) DEVICE — STERILE LATEX POWDER FREE SURGICAL GLOVES WITH HYDROGEL COATING: Brand: PROTEXIS

## (undated) DEVICE — TUBING, SUCTION, 1/4" X 20', STRAIGHT: Brand: MEDLINE INDUSTRIES, INC.

## (undated) DEVICE — SPONGE,DISSECTOR,K,XRAY,9/16"X1/4",STRL: Brand: MEDLINE

## (undated) DEVICE — TOTAL TRAY, 16FR 10ML SIL FOLEY, URN: Brand: MEDLINE

## (undated) DEVICE — ANTIBACTERIAL UNDYED BRAIDED (POLYGLACTIN 910), SYNTHETIC ABSORBABLE SUTURE: Brand: COATED VICRYL

## (undated) DEVICE — GLOVE SURG SZ 85 L12IN FNGR THK79MIL GRN LTX FREE

## (undated) DEVICE — SURGICAL PROCEDURE PACK LOWER EXTREMITY LOURDES HOSP

## (undated) DEVICE — PACK,SET UP,NO DRAPES: Brand: MEDLINE

## (undated) DEVICE — SUT SILK 4/0 SUTUPAK TIES 24IN SA73H

## (undated) DEVICE — SUTURE VCRL SZ 2-0 L36IN ABSRB UD L36MM CT-1 1/2 CIR J945H

## (undated) DEVICE — GOWN,PREVENTION PLUS,XL,ST,24/CS: Brand: MEDLINE

## (undated) DEVICE — AMBU AURA-I U SIZE 4, DISPOSABLE LARYNGEAL MASK: Brand: AURA-I

## (undated) DEVICE — SUTURE MCRYL SZ 3 0 L18IN ABSRB UD PS 2 3 8 CIR REV CUT NDL MCP497G

## (undated) DEVICE — 4-PORT MANIFOLD: Brand: NEPTUNE 2

## (undated) DEVICE — E-Z CLEAN, NON-STICK, PTFE COATED, ELECTROSURGICAL BLADE ELECTRODE, 6.5 INCH (16.5 CM): Brand: MEGADYNE

## (undated) DEVICE — SPNG GZ WOVN 4X4IN 12PLY 10/BX STRL

## (undated) DEVICE — SUT SILK 0 SUTUPAK TIES 24IN SA76G

## (undated) DEVICE — YANKAUER SUCTION INSTRUMENT WITHOUT CONTROL VENT, OPEN TIP, CLEAR: Brand: YANKAUER

## (undated) DEVICE — PAD,ARMBOARD,CONV,FOAM,2X8X20",12PR/CS: Brand: MEDLINE

## (undated) DEVICE — GLV SURG BIOGEL LTX PF 7 1/2

## (undated) DEVICE — CVR UNIV C/ARM

## (undated) DEVICE — TROCAR TIP NITINOL GUIDEWIRE 18": Brand: INVICTUS

## (undated) DEVICE — SUT SILK 3/0 SUTUPAK TIES 24IN SA74H